# Patient Record
Sex: FEMALE | Race: WHITE | Employment: FULL TIME | URBAN - METROPOLITAN AREA
[De-identification: names, ages, dates, MRNs, and addresses within clinical notes are randomized per-mention and may not be internally consistent; named-entity substitution may affect disease eponyms.]

---

## 2017-01-10 ENCOUNTER — OFFICE VISIT (OUTPATIENT)
Dept: FAMILY MEDICINE CLINIC | Age: 27
End: 2017-01-10

## 2017-01-10 VITALS
SYSTOLIC BLOOD PRESSURE: 128 MMHG | DIASTOLIC BLOOD PRESSURE: 84 MMHG | BODY MASS INDEX: 18.91 KG/M2 | RESPIRATION RATE: 12 BRPM | TEMPERATURE: 98.4 F | WEIGHT: 126.2 LBS | HEART RATE: 88 BPM

## 2017-01-10 DIAGNOSIS — F39 MOOD DISORDER (HCC): Primary | ICD-10-CM

## 2017-01-10 DIAGNOSIS — F41.9 ANXIETY: ICD-10-CM

## 2017-01-10 PROCEDURE — 99213 OFFICE O/P EST LOW 20 MIN: CPT | Performed by: FAMILY MEDICINE

## 2017-01-10 RX ORDER — CLONAZEPAM 1 MG/1
1 TABLET ORAL 3 TIMES DAILY PRN
Qty: 75 TABLET | Refills: 1 | Status: SHIPPED | OUTPATIENT
Start: 2017-01-10 | End: 2017-03-07 | Stop reason: SDUPTHER

## 2017-01-10 RX ORDER — CLONAZEPAM 0.5 MG/1
0.5 TABLET ORAL 3 TIMES DAILY PRN
Qty: 75 TABLET | Refills: 0 | Status: CANCELLED | OUTPATIENT
Start: 2017-01-10

## 2017-01-15 LAB
6-ACETYLMORPHINE: NOT DETECTED
7-AMINOCLONAZEPAM: PRESENT
ALPHA-OH-ALPRAZOLAM: PRESENT
ALPRAZOLAM: PRESENT
AMPHETAMINE: NOT DETECTED
BARBITURATES: NOT DETECTED
BENZOYLECGONINE: NOT DETECTED
BUPRENORPHINE: NOT DETECTED
CARISOPRODOL: NOT DETECTED
CLONAZEPAM: NOT DETECTED
CODEINE: NOT DETECTED
CREATININE URINE: 121.9 MG/DL (ref 20–400)
DIAZEPAM: NOT DETECTED
DRUGS EXPECTED: NORMAL
EER PAIN MGT DRUG PANEL, HIGH RES/EMIT U: NORMAL
ETHYL GLUCURONIDE: NOT DETECTED
FENTANYL: NOT DETECTED
HYDROCODONE: NOT DETECTED
HYDROMORPHONE: NOT DETECTED
LORAZEPAM: NOT DETECTED
MARIJUANA METABOLITE: PRESENT
MDA: NOT DETECTED
MDEA: NOT DETECTED
MDMA URINE: NOT DETECTED
MEPERIDINE: NOT DETECTED
METHADONE: NOT DETECTED
METHAMPHETAMINE: NOT DETECTED
METHYLPHENIDATE: NOT DETECTED
MIDAZOLAM: NOT DETECTED
MORPHINE: NOT DETECTED
NORBUPRENORPHINE, FREE: NOT DETECTED
NORDIAZEPAM: NOT DETECTED
NORFENTANYL: NOT DETECTED
NORHYDROCODONE, URINE: NOT DETECTED
NOROXYCODONE: NOT DETECTED
NOROXYMORPHONE, URINE: NOT DETECTED
OXAZEPAM: NOT DETECTED
OXYCODONE: NOT DETECTED
OXYMORPHONE: NOT DETECTED
PAIN MANAGEMENT DRUG PANEL: NORMAL
PAIN MANAGEMENT DRUG PANEL: NORMAL
PCP: NOT DETECTED
PHENTERMINE: NOT DETECTED
PROPOXYPHENE: NOT DETECTED
TAPENTADOL, URINE: NOT DETECTED
TAPENTADOL-O-SULFATE, URINE: NOT DETECTED
TEMAZEPAM: NOT DETECTED
TRAMADOL: NOT DETECTED
ZOLPIDEM: NOT DETECTED

## 2017-02-04 DIAGNOSIS — L70.9 ACNE, UNSPECIFIED ACNE TYPE: ICD-10-CM

## 2017-02-04 DIAGNOSIS — F39 MOOD DISORDER (HCC): ICD-10-CM

## 2017-02-09 RX ORDER — MINOCYCLINE HYDROCHLORIDE 75 MG/1
CAPSULE ORAL
Qty: 60 CAPSULE | Refills: 5 | Status: SHIPPED | OUTPATIENT
Start: 2017-02-09 | End: 2017-07-18 | Stop reason: SDUPTHER

## 2017-02-09 RX ORDER — PAROXETINE 30 MG/1
TABLET, FILM COATED ORAL
Qty: 15 TABLET | Refills: 5 | Status: SHIPPED | OUTPATIENT
Start: 2017-02-09 | End: 2017-04-04 | Stop reason: ALTCHOICE

## 2017-03-01 ENCOUNTER — TELEPHONE (OUTPATIENT)
Dept: FAMILY MEDICINE CLINIC | Age: 27
End: 2017-03-01

## 2017-03-07 ENCOUNTER — OFFICE VISIT (OUTPATIENT)
Dept: FAMILY MEDICINE CLINIC | Age: 27
End: 2017-03-07

## 2017-03-07 VITALS
DIASTOLIC BLOOD PRESSURE: 83 MMHG | TEMPERATURE: 97.8 F | HEART RATE: 118 BPM | SYSTOLIC BLOOD PRESSURE: 107 MMHG | WEIGHT: 130 LBS | BODY MASS INDEX: 19.48 KG/M2 | OXYGEN SATURATION: 99 %

## 2017-03-07 DIAGNOSIS — F39 MOOD DISORDER (HCC): ICD-10-CM

## 2017-03-07 DIAGNOSIS — R22.1 NECK SWELLING: ICD-10-CM

## 2017-03-07 DIAGNOSIS — F41.9 ANXIETY: Primary | ICD-10-CM

## 2017-03-07 DIAGNOSIS — Z86.79 HISTORY OF SINUS TACHYCARDIA: ICD-10-CM

## 2017-03-07 PROCEDURE — 99213 OFFICE O/P EST LOW 20 MIN: CPT | Performed by: FAMILY MEDICINE

## 2017-03-07 RX ORDER — CEPHALEXIN 500 MG/1
500 CAPSULE ORAL
COMMUNITY
Start: 2017-03-01 | End: 2017-03-11

## 2017-03-07 RX ORDER — CLONAZEPAM 1 MG/1
1 TABLET ORAL 3 TIMES DAILY PRN
Qty: 75 TABLET | Refills: 2 | Status: SHIPPED | OUTPATIENT
Start: 2017-03-07 | End: 2017-04-04 | Stop reason: ALTCHOICE

## 2017-03-10 DIAGNOSIS — F41.9 ANXIETY: ICD-10-CM

## 2017-03-14 RX ORDER — CLONAZEPAM 1 MG/1
TABLET ORAL
Qty: 75 TABLET | Refills: 0 | OUTPATIENT
Start: 2017-03-14

## 2017-03-29 ENCOUNTER — OFFICE VISIT (OUTPATIENT)
Dept: FAMILY MEDICINE CLINIC | Age: 27
End: 2017-03-29

## 2017-03-29 VITALS
HEART RATE: 103 BPM | SYSTOLIC BLOOD PRESSURE: 106 MMHG | BODY MASS INDEX: 19.18 KG/M2 | DIASTOLIC BLOOD PRESSURE: 74 MMHG | TEMPERATURE: 97 F | OXYGEN SATURATION: 99 % | WEIGHT: 128 LBS

## 2017-03-29 DIAGNOSIS — Z72.0 TOBACCO USE: ICD-10-CM

## 2017-03-29 DIAGNOSIS — F19.930: Primary | ICD-10-CM

## 2017-03-29 DIAGNOSIS — F39 MOOD DISORDER (HCC): ICD-10-CM

## 2017-03-29 DIAGNOSIS — M79.18 MYOFASCIAL PAIN: ICD-10-CM

## 2017-03-29 DIAGNOSIS — Z86.79 HISTORY OF SINUS TACHYCARDIA: ICD-10-CM

## 2017-03-29 PROCEDURE — 99214 OFFICE O/P EST MOD 30 MIN: CPT | Performed by: FAMILY MEDICINE

## 2017-03-29 RX ORDER — BUPROPION HYDROCHLORIDE 100 MG/1
100 TABLET, EXTENDED RELEASE ORAL 2 TIMES DAILY
COMMUNITY
End: 2017-04-18 | Stop reason: ALTCHOICE

## 2017-03-29 RX ORDER — CLONIDINE HYDROCHLORIDE 0.1 MG/1
0.1 TABLET ORAL
COMMUNITY
Start: 2015-10-26 | End: 2017-04-18 | Stop reason: ALTCHOICE

## 2017-04-03 ENCOUNTER — OFFICE VISIT (OUTPATIENT)
Dept: PSYCHOLOGY | Age: 27
End: 2017-04-03

## 2017-04-03 DIAGNOSIS — F41.1 GENERALIZED ANXIETY DISORDER: Primary | ICD-10-CM

## 2017-04-03 DIAGNOSIS — F39 MOOD DISORDER (HCC): ICD-10-CM

## 2017-04-03 DIAGNOSIS — F19.930: ICD-10-CM

## 2017-04-03 PROBLEM — F19.939: Status: ACTIVE | Noted: 2017-04-03

## 2017-04-03 PROBLEM — Z72.0 TOBACCO USE: Status: ACTIVE | Noted: 2017-04-03

## 2017-04-03 PROCEDURE — 90791 PSYCH DIAGNOSTIC EVALUATION: CPT | Performed by: PSYCHOLOGIST

## 2017-04-03 ASSESSMENT — PATIENT HEALTH QUESTIONNAIRE - PHQ9
1. LITTLE INTEREST OR PLEASURE IN DOING THINGS: 3
4. FEELING TIRED OR HAVING LITTLE ENERGY: 3
9. THOUGHTS THAT YOU WOULD BE BETTER OFF DEAD, OR OF HURTING YOURSELF: 3
3. TROUBLE FALLING OR STAYING ASLEEP: 3
SUM OF ALL RESPONSES TO PHQ QUESTIONS 1-9: 27
10. IF YOU CHECKED OFF ANY PROBLEMS, HOW DIFFICULT HAVE THESE PROBLEMS MADE IT FOR YOU TO DO YOUR WORK, TAKE CARE OF THINGS AT HOME, OR GET ALONG WITH OTHER PEOPLE: 3
7. TROUBLE CONCENTRATING ON THINGS, SUCH AS READING THE NEWSPAPER OR WATCHING TELEVISION: 3
8. MOVING OR SPEAKING SO SLOWLY THAT OTHER PEOPLE COULD HAVE NOTICED. OR THE OPPOSITE, BEING SO FIGETY OR RESTLESS THAT YOU HAVE BEEN MOVING AROUND A LOT MORE THAN USUAL: 3
6. FEELING BAD ABOUT YOURSELF - OR THAT YOU ARE A FAILURE OR HAVE LET YOURSELF OR YOUR FAMILY DOWN: 3
2. FEELING DOWN, DEPRESSED OR HOPELESS: 3
5. POOR APPETITE OR OVEREATING: 3
SUM OF ALL RESPONSES TO PHQ9 QUESTIONS 1 & 2: 6

## 2017-04-04 ENCOUNTER — OFFICE VISIT (OUTPATIENT)
Dept: FAMILY MEDICINE CLINIC | Age: 27
End: 2017-04-04

## 2017-04-04 VITALS
BODY MASS INDEX: 18.61 KG/M2 | DIASTOLIC BLOOD PRESSURE: 61 MMHG | SYSTOLIC BLOOD PRESSURE: 98 MMHG | HEART RATE: 91 BPM | WEIGHT: 124.2 LBS | OXYGEN SATURATION: 100 % | RESPIRATION RATE: 16 BRPM | TEMPERATURE: 97.7 F

## 2017-04-04 DIAGNOSIS — M79.7 FIBROMYALGIA SYNDROME: Primary | ICD-10-CM

## 2017-04-04 DIAGNOSIS — F41.1 GENERALIZED ANXIETY DISORDER: ICD-10-CM

## 2017-04-04 PROCEDURE — 99214 OFFICE O/P EST MOD 30 MIN: CPT | Performed by: FAMILY MEDICINE

## 2017-04-04 RX ORDER — CELECOXIB 200 MG/1
CAPSULE ORAL
Qty: 45 CAPSULE | Refills: 3 | Status: SHIPPED | OUTPATIENT
Start: 2017-04-04 | End: 2017-05-25 | Stop reason: ALTCHOICE

## 2017-04-04 RX ORDER — DULOXETIN HYDROCHLORIDE 30 MG/1
30 CAPSULE, DELAYED RELEASE ORAL DAILY
Qty: 30 CAPSULE | Refills: 3 | Status: SHIPPED | OUTPATIENT
Start: 2017-04-04 | End: 2017-05-03 | Stop reason: DRUGHIGH

## 2017-04-04 RX ORDER — CLONAZEPAM 1 MG/1
TABLET ORAL
Qty: 120 TABLET | Refills: 0 | Status: SHIPPED | OUTPATIENT
Start: 2017-04-04 | End: 2017-05-01 | Stop reason: SDUPTHER

## 2017-04-05 ENCOUNTER — TELEPHONE (OUTPATIENT)
Dept: FAMILY MEDICINE CLINIC | Age: 27
End: 2017-04-05

## 2017-04-06 ENCOUNTER — TELEPHONE (OUTPATIENT)
Dept: FAMILY MEDICINE CLINIC | Age: 27
End: 2017-04-06

## 2017-04-17 ENCOUNTER — OFFICE VISIT (OUTPATIENT)
Dept: PSYCHOLOGY | Age: 27
End: 2017-04-17

## 2017-04-17 DIAGNOSIS — F39 MOOD DISORDER (HCC): ICD-10-CM

## 2017-04-17 DIAGNOSIS — F41.1 GENERALIZED ANXIETY DISORDER: Primary | ICD-10-CM

## 2017-04-17 PROCEDURE — 90832 PSYTX W PT 30 MINUTES: CPT | Performed by: PSYCHOLOGIST

## 2017-04-17 ASSESSMENT — PATIENT HEALTH QUESTIONNAIRE - PHQ9
1. LITTLE INTEREST OR PLEASURE IN DOING THINGS: 2
7. TROUBLE CONCENTRATING ON THINGS, SUCH AS READING THE NEWSPAPER OR WATCHING TELEVISION: 3
3. TROUBLE FALLING OR STAYING ASLEEP: 3
6. FEELING BAD ABOUT YOURSELF - OR THAT YOU ARE A FAILURE OR HAVE LET YOURSELF OR YOUR FAMILY DOWN: 3
5. POOR APPETITE OR OVEREATING: 3
SUM OF ALL RESPONSES TO PHQ9 QUESTIONS 1 & 2: 4
2. FEELING DOWN, DEPRESSED OR HOPELESS: 2
9. THOUGHTS THAT YOU WOULD BE BETTER OFF DEAD, OR OF HURTING YOURSELF: 1
4. FEELING TIRED OR HAVING LITTLE ENERGY: 3
SUM OF ALL RESPONSES TO PHQ QUESTIONS 1-9: 23
8. MOVING OR SPEAKING SO SLOWLY THAT OTHER PEOPLE COULD HAVE NOTICED. OR THE OPPOSITE, BEING SO FIGETY OR RESTLESS THAT YOU HAVE BEEN MOVING AROUND A LOT MORE THAN USUAL: 3

## 2017-04-18 ENCOUNTER — OFFICE VISIT (OUTPATIENT)
Dept: FAMILY MEDICINE CLINIC | Age: 27
End: 2017-04-18

## 2017-04-18 VITALS
OXYGEN SATURATION: 99 % | BODY MASS INDEX: 18.34 KG/M2 | DIASTOLIC BLOOD PRESSURE: 75 MMHG | TEMPERATURE: 96.6 F | WEIGHT: 122.4 LBS | RESPIRATION RATE: 12 BRPM | SYSTOLIC BLOOD PRESSURE: 123 MMHG | HEART RATE: 105 BPM

## 2017-04-18 DIAGNOSIS — M79.7 FIBROMYALGIA SYNDROME: Primary | ICD-10-CM

## 2017-04-18 DIAGNOSIS — Z72.0 TOBACCO USE: ICD-10-CM

## 2017-04-18 DIAGNOSIS — F39 MOOD DISORDER (HCC): ICD-10-CM

## 2017-04-18 PROBLEM — F19.939: Status: RESOLVED | Noted: 2017-04-03 | Resolved: 2017-04-18

## 2017-04-18 PROCEDURE — 99213 OFFICE O/P EST LOW 20 MIN: CPT | Performed by: FAMILY MEDICINE

## 2017-05-01 DIAGNOSIS — F41.1 GENERALIZED ANXIETY DISORDER: ICD-10-CM

## 2017-05-02 RX ORDER — CLONAZEPAM 1 MG/1
TABLET ORAL
Qty: 120 TABLET | Refills: 0 | Status: SHIPPED | OUTPATIENT
Start: 2017-05-02 | End: 2017-05-25 | Stop reason: SDUPTHER

## 2017-05-03 ENCOUNTER — OFFICE VISIT (OUTPATIENT)
Dept: FAMILY MEDICINE CLINIC | Age: 27
End: 2017-05-03

## 2017-05-03 VITALS
HEART RATE: 98 BPM | TEMPERATURE: 97.7 F | WEIGHT: 123.4 LBS | BODY MASS INDEX: 18.7 KG/M2 | HEIGHT: 68 IN | SYSTOLIC BLOOD PRESSURE: 111 MMHG | OXYGEN SATURATION: 100 % | DIASTOLIC BLOOD PRESSURE: 72 MMHG | RESPIRATION RATE: 12 BRPM

## 2017-05-03 DIAGNOSIS — M79.7 FIBROMYALGIA SYNDROME: Primary | ICD-10-CM

## 2017-05-03 DIAGNOSIS — Q87.40 MARFAN SYNDROME: ICD-10-CM

## 2017-05-03 DIAGNOSIS — F41.1 GENERALIZED ANXIETY DISORDER: ICD-10-CM

## 2017-05-03 DIAGNOSIS — Z72.0 TOBACCO USE: ICD-10-CM

## 2017-05-03 PROCEDURE — 99213 OFFICE O/P EST LOW 20 MIN: CPT | Performed by: FAMILY MEDICINE

## 2017-05-03 RX ORDER — DULOXETIN HYDROCHLORIDE 60 MG/1
60 CAPSULE, DELAYED RELEASE ORAL DAILY
Qty: 30 CAPSULE | Refills: 5 | Status: SHIPPED | OUTPATIENT
Start: 2017-05-03 | End: 2017-07-18 | Stop reason: SDUPTHER

## 2017-05-03 RX ORDER — ACETAMINOPHEN, ASPIRIN AND CAFFEINE 250; 250; 65 MG/1; MG/1; MG/1
1 TABLET, FILM COATED ORAL EVERY 6 HOURS PRN
COMMUNITY
End: 2017-09-05 | Stop reason: ALTCHOICE

## 2017-05-03 RX ORDER — ALPRAZOLAM 0.5 MG/1
0.5 TABLET ORAL 3 TIMES DAILY PRN
Qty: 6 TABLET | Refills: 0 | Status: SHIPPED | OUTPATIENT
Start: 2017-05-03 | End: 2017-05-25 | Stop reason: ALTCHOICE

## 2017-05-03 RX ORDER — ALPRAZOLAM 0.5 MG/1
0.5 TABLET ORAL 3 TIMES DAILY PRN
Qty: 6 TABLET | Refills: 0 | Status: SHIPPED | OUTPATIENT
Start: 2017-05-03 | End: 2017-05-03 | Stop reason: SDUPTHER

## 2017-05-03 RX ORDER — TRAMADOL HYDROCHLORIDE 50 MG/1
50 TABLET ORAL EVERY 6 HOURS PRN
Qty: 30 TABLET | Refills: 0 | Status: SHIPPED | OUTPATIENT
Start: 2017-05-03 | End: 2017-05-09 | Stop reason: SDUPTHER

## 2017-05-09 DIAGNOSIS — F41.1 GENERALIZED ANXIETY DISORDER: ICD-10-CM

## 2017-05-09 DIAGNOSIS — M79.7 FIBROMYALGIA SYNDROME: ICD-10-CM

## 2017-05-10 RX ORDER — TRAMADOL HYDROCHLORIDE 50 MG/1
50 TABLET ORAL EVERY 6 HOURS PRN
Qty: 90 TABLET | Refills: 0 | Status: SHIPPED | OUTPATIENT
Start: 2017-05-10 | End: 2017-05-25 | Stop reason: SDUPTHER

## 2017-05-25 ENCOUNTER — OFFICE VISIT (OUTPATIENT)
Dept: PSYCHOLOGY | Age: 27
End: 2017-05-25

## 2017-05-25 ENCOUNTER — OFFICE VISIT (OUTPATIENT)
Dept: FAMILY MEDICINE CLINIC | Age: 27
End: 2017-05-25

## 2017-05-25 VITALS
HEART RATE: 85 BPM | OXYGEN SATURATION: 100 % | SYSTOLIC BLOOD PRESSURE: 97 MMHG | DIASTOLIC BLOOD PRESSURE: 59 MMHG | RESPIRATION RATE: 16 BRPM

## 2017-05-25 DIAGNOSIS — F41.1 GENERALIZED ANXIETY DISORDER: Primary | ICD-10-CM

## 2017-05-25 DIAGNOSIS — F39 MOOD DISORDER (HCC): ICD-10-CM

## 2017-05-25 DIAGNOSIS — F32.89 OTHER DEPRESSION: Primary | ICD-10-CM

## 2017-05-25 DIAGNOSIS — F41.1 GENERALIZED ANXIETY DISORDER: ICD-10-CM

## 2017-05-25 DIAGNOSIS — M79.7 FIBROMYALGIA SYNDROME: ICD-10-CM

## 2017-05-25 PROCEDURE — 90832 PSYTX W PT 30 MINUTES: CPT | Performed by: PSYCHOLOGIST

## 2017-05-25 PROCEDURE — 99213 OFFICE O/P EST LOW 20 MIN: CPT | Performed by: FAMILY MEDICINE

## 2017-05-25 RX ORDER — CLONAZEPAM 1 MG/1
TABLET ORAL
Qty: 120 TABLET | Refills: 0 | Status: SHIPPED | OUTPATIENT
Start: 2017-06-04 | End: 2017-06-13 | Stop reason: SDUPTHER

## 2017-05-25 RX ORDER — TRAMADOL HYDROCHLORIDE 50 MG/1
TABLET ORAL
Qty: 60 TABLET | Refills: 0 | Status: SHIPPED | OUTPATIENT
Start: 2017-06-10 | End: 2017-06-13 | Stop reason: SDUPTHER

## 2017-05-25 RX ORDER — ARIPIPRAZOLE 5 MG/1
5 TABLET ORAL DAILY
Qty: 30 TABLET | Refills: 3 | Status: SHIPPED | OUTPATIENT
Start: 2017-05-25 | End: 2017-07-18 | Stop reason: SDUPTHER

## 2017-05-25 RX ORDER — ARIPIPRAZOLE 5 MG/1
5 TABLET ORAL DAILY
Qty: 30 TABLET | Refills: 3 | Status: SHIPPED | OUTPATIENT
Start: 2017-05-25 | End: 2017-05-25 | Stop reason: SDUPTHER

## 2017-05-25 ASSESSMENT — PATIENT HEALTH QUESTIONNAIRE - PHQ9
1. LITTLE INTEREST OR PLEASURE IN DOING THINGS: 2
7. TROUBLE CONCENTRATING ON THINGS, SUCH AS READING THE NEWSPAPER OR WATCHING TELEVISION: 2
6. FEELING BAD ABOUT YOURSELF - OR THAT YOU ARE A FAILURE OR HAVE LET YOURSELF OR YOUR FAMILY DOWN: 2
10. IF YOU CHECKED OFF ANY PROBLEMS, HOW DIFFICULT HAVE THESE PROBLEMS MADE IT FOR YOU TO DO YOUR WORK, TAKE CARE OF THINGS AT HOME, OR GET ALONG WITH OTHER PEOPLE: 1
5. POOR APPETITE OR OVEREATING: 0
9. THOUGHTS THAT YOU WOULD BE BETTER OFF DEAD, OR OF HURTING YOURSELF: 0
3. TROUBLE FALLING OR STAYING ASLEEP: 0
SUM OF ALL RESPONSES TO PHQ QUESTIONS 1-9: 12
SUM OF ALL RESPONSES TO PHQ9 QUESTIONS 1 & 2: 4
4. FEELING TIRED OR HAVING LITTLE ENERGY: 2
8. MOVING OR SPEAKING SO SLOWLY THAT OTHER PEOPLE COULD HAVE NOTICED. OR THE OPPOSITE, BEING SO FIGETY OR RESTLESS THAT YOU HAVE BEEN MOVING AROUND A LOT MORE THAN USUAL: 2
2. FEELING DOWN, DEPRESSED OR HOPELESS: 2

## 2017-06-13 ENCOUNTER — OFFICE VISIT (OUTPATIENT)
Dept: FAMILY MEDICINE CLINIC | Age: 27
End: 2017-06-13

## 2017-06-13 VITALS
WEIGHT: 123.8 LBS | TEMPERATURE: 97.9 F | DIASTOLIC BLOOD PRESSURE: 71 MMHG | HEART RATE: 85 BPM | BODY MASS INDEX: 18.82 KG/M2 | SYSTOLIC BLOOD PRESSURE: 105 MMHG | OXYGEN SATURATION: 98 %

## 2017-06-13 DIAGNOSIS — M79.7 FIBROMYALGIA SYNDROME: ICD-10-CM

## 2017-06-13 DIAGNOSIS — F41.1 GENERALIZED ANXIETY DISORDER: ICD-10-CM

## 2017-06-13 PROCEDURE — 99213 OFFICE O/P EST LOW 20 MIN: CPT | Performed by: FAMILY MEDICINE

## 2017-06-13 RX ORDER — TRAMADOL HYDROCHLORIDE 50 MG/1
TABLET ORAL
Qty: 90 TABLET | Refills: 1 | Status: SHIPPED | OUTPATIENT
Start: 2017-06-13 | End: 2017-07-18 | Stop reason: SDUPTHER

## 2017-06-13 RX ORDER — CLONAZEPAM 1 MG/1
TABLET ORAL
Qty: 120 TABLET | Refills: 0 | Status: SHIPPED | OUTPATIENT
Start: 2017-06-28 | End: 2017-07-18 | Stop reason: SDUPTHER

## 2017-06-22 ENCOUNTER — OFFICE VISIT (OUTPATIENT)
Dept: PSYCHOLOGY | Age: 27
End: 2017-06-22

## 2017-06-22 DIAGNOSIS — F39 MOOD DISORDER (HCC): ICD-10-CM

## 2017-06-22 DIAGNOSIS — F41.1 GENERALIZED ANXIETY DISORDER: Primary | ICD-10-CM

## 2017-06-22 PROCEDURE — 90832 PSYTX W PT 30 MINUTES: CPT | Performed by: PSYCHOLOGIST

## 2017-06-22 ASSESSMENT — PATIENT HEALTH QUESTIONNAIRE - PHQ9
8. MOVING OR SPEAKING SO SLOWLY THAT OTHER PEOPLE COULD HAVE NOTICED. OR THE OPPOSITE, BEING SO FIGETY OR RESTLESS THAT YOU HAVE BEEN MOVING AROUND A LOT MORE THAN USUAL: 2
3. TROUBLE FALLING OR STAYING ASLEEP: 0
9. THOUGHTS THAT YOU WOULD BE BETTER OFF DEAD, OR OF HURTING YOURSELF: 0
4. FEELING TIRED OR HAVING LITTLE ENERGY: 2
2. FEELING DOWN, DEPRESSED OR HOPELESS: 2
SUM OF ALL RESPONSES TO PHQ QUESTIONS 1-9: 12
1. LITTLE INTEREST OR PLEASURE IN DOING THINGS: 0
SUM OF ALL RESPONSES TO PHQ9 QUESTIONS 1 & 2: 2
5. POOR APPETITE OR OVEREATING: 2
7. TROUBLE CONCENTRATING ON THINGS, SUCH AS READING THE NEWSPAPER OR WATCHING TELEVISION: 2
6. FEELING BAD ABOUT YOURSELF - OR THAT YOU ARE A FAILURE OR HAVE LET YOURSELF OR YOUR FAMILY DOWN: 2

## 2017-06-22 ASSESSMENT — ANXIETY QUESTIONNAIRES
3. WORRYING TOO MUCH ABOUT DIFFERENT THINGS: 3-NEARLY EVERY DAY
GAD7 TOTAL SCORE: 12
1. FEELING NERVOUS, ANXIOUS, OR ON EDGE: 3-NEARLY EVERY DAY
4. TROUBLE RELAXING: 2-OVER HALF THE DAYS
7. FEELING AFRAID AS IF SOMETHING AWFUL MIGHT HAPPEN: 1-SEVERAL DAYS
5. BEING SO RESTLESS THAT IT IS HARD TO SIT STILL: 0-NOT AT ALL SURE
6. BECOMING EASILY ANNOYED OR IRRITABLE: 1-SEVERAL DAYS
2. NOT BEING ABLE TO STOP OR CONTROL WORRYING: 2-OVER HALF THE DAYS

## 2017-06-23 RX ORDER — METHOCARBAMOL 500 MG/1
TABLET, FILM COATED ORAL
Qty: 30 TABLET | Refills: 0 | Status: SHIPPED | OUTPATIENT
Start: 2017-06-23 | End: 2017-07-18 | Stop reason: SDUPTHER

## 2017-06-25 DIAGNOSIS — M79.18 MYOFASCIAL PAIN: ICD-10-CM

## 2017-06-27 RX ORDER — IBUPROFEN 800 MG/1
TABLET ORAL
Qty: 60 TABLET | Refills: 0 | Status: SHIPPED | OUTPATIENT
Start: 2017-06-27 | End: 2017-07-18 | Stop reason: SDUPTHER

## 2017-07-18 ENCOUNTER — OFFICE VISIT (OUTPATIENT)
Dept: FAMILY MEDICINE CLINIC | Age: 27
End: 2017-07-18

## 2017-07-18 VITALS
OXYGEN SATURATION: 98 % | DIASTOLIC BLOOD PRESSURE: 71 MMHG | TEMPERATURE: 97.5 F | HEART RATE: 95 BPM | SYSTOLIC BLOOD PRESSURE: 107 MMHG

## 2017-07-18 DIAGNOSIS — M79.7 FIBROMYALGIA SYNDROME: ICD-10-CM

## 2017-07-18 DIAGNOSIS — F41.1 GENERALIZED ANXIETY DISORDER: Primary | ICD-10-CM

## 2017-07-18 DIAGNOSIS — L70.9 ACNE, UNSPECIFIED ACNE TYPE: ICD-10-CM

## 2017-07-18 DIAGNOSIS — M79.18 MYOFASCIAL PAIN: ICD-10-CM

## 2017-07-18 DIAGNOSIS — F32.89 OTHER DEPRESSION: ICD-10-CM

## 2017-07-18 PROCEDURE — 99213 OFFICE O/P EST LOW 20 MIN: CPT | Performed by: FAMILY MEDICINE

## 2017-07-18 RX ORDER — TRAMADOL HYDROCHLORIDE 50 MG/1
TABLET ORAL
Qty: 90 TABLET | Refills: 2 | Status: SHIPPED | OUTPATIENT
Start: 2017-07-18 | End: 2017-10-04 | Stop reason: SDUPTHER

## 2017-07-18 RX ORDER — IBUPROFEN 800 MG/1
800 TABLET ORAL EVERY 8 HOURS PRN
Qty: 60 TABLET | Refills: 0 | Status: SHIPPED | OUTPATIENT
Start: 2017-07-18 | End: 2017-09-05 | Stop reason: ALTCHOICE

## 2017-07-18 RX ORDER — DULOXETIN HYDROCHLORIDE 60 MG/1
60 CAPSULE, DELAYED RELEASE ORAL DAILY
Qty: 30 CAPSULE | Refills: 5 | Status: SHIPPED | OUTPATIENT
Start: 2017-07-18 | End: 2017-09-05 | Stop reason: ALTCHOICE

## 2017-07-18 RX ORDER — MINOCYCLINE HYDROCHLORIDE 75 MG/1
CAPSULE ORAL
Qty: 60 CAPSULE | Refills: 5 | Status: SHIPPED | OUTPATIENT
Start: 2017-07-18 | End: 2017-09-05 | Stop reason: ALTCHOICE

## 2017-07-18 RX ORDER — METHOCARBAMOL 500 MG/1
TABLET, FILM COATED ORAL
Qty: 30 TABLET | Refills: 2 | Status: SHIPPED | OUTPATIENT
Start: 2017-07-18 | End: 2017-09-05

## 2017-07-18 RX ORDER — ARIPIPRAZOLE 5 MG/1
5 TABLET ORAL DAILY
Qty: 30 TABLET | Refills: 5 | Status: SHIPPED | OUTPATIENT
Start: 2017-07-18 | End: 2017-11-30

## 2017-07-18 RX ORDER — ONDANSETRON 4 MG/1
4 TABLET, FILM COATED ORAL DAILY PRN
Qty: 30 TABLET | Refills: 5 | Status: SHIPPED | OUTPATIENT
Start: 2017-07-18 | End: 2017-09-05 | Stop reason: SDUPTHER

## 2017-07-18 RX ORDER — CLONAZEPAM 1 MG/1
TABLET ORAL
Qty: 120 TABLET | Refills: 2 | Status: SHIPPED | OUTPATIENT
Start: 2017-07-18 | End: 2017-10-04 | Stop reason: SDUPTHER

## 2017-07-20 ENCOUNTER — OFFICE VISIT (OUTPATIENT)
Dept: PSYCHOLOGY | Age: 27
End: 2017-07-20

## 2017-07-20 DIAGNOSIS — F41.1 GENERALIZED ANXIETY DISORDER: Primary | ICD-10-CM

## 2017-07-20 DIAGNOSIS — F39 MOOD DISORDER (HCC): ICD-10-CM

## 2017-07-20 PROCEDURE — 90832 PSYTX W PT 30 MINUTES: CPT | Performed by: PSYCHOLOGIST

## 2017-07-20 ASSESSMENT — ANXIETY QUESTIONNAIRES
GAD7 TOTAL SCORE: 14
2. NOT BEING ABLE TO STOP OR CONTROL WORRYING: 3-NEARLY EVERY DAY
3. WORRYING TOO MUCH ABOUT DIFFERENT THINGS: 3-NEARLY EVERY DAY
7. FEELING AFRAID AS IF SOMETHING AWFUL MIGHT HAPPEN: 0-NOT AT ALL SURE
6. BECOMING EASILY ANNOYED OR IRRITABLE: 1-SEVERAL DAYS
5. BEING SO RESTLESS THAT IT IS HARD TO SIT STILL: 3-NEARLY EVERY DAY
1. FEELING NERVOUS, ANXIOUS, OR ON EDGE: 3-NEARLY EVERY DAY
4. TROUBLE RELAXING: 1-SEVERAL DAYS

## 2017-07-20 ASSESSMENT — PATIENT HEALTH QUESTIONNAIRE - PHQ9
3. TROUBLE FALLING OR STAYING ASLEEP: 1
5. POOR APPETITE OR OVEREATING: 1
1. LITTLE INTEREST OR PLEASURE IN DOING THINGS: 2
7. TROUBLE CONCENTRATING ON THINGS, SUCH AS READING THE NEWSPAPER OR WATCHING TELEVISION: 2
2. FEELING DOWN, DEPRESSED OR HOPELESS: 1
4. FEELING TIRED OR HAVING LITTLE ENERGY: 2
9. THOUGHTS THAT YOU WOULD BE BETTER OFF DEAD, OR OF HURTING YOURSELF: 0
6. FEELING BAD ABOUT YOURSELF - OR THAT YOU ARE A FAILURE OR HAVE LET YOURSELF OR YOUR FAMILY DOWN: 0
SUM OF ALL RESPONSES TO PHQ9 QUESTIONS 1 & 2: 3
SUM OF ALL RESPONSES TO PHQ QUESTIONS 1-9: 12
8. MOVING OR SPEAKING SO SLOWLY THAT OTHER PEOPLE COULD HAVE NOTICED. OR THE OPPOSITE, BEING SO FIGETY OR RESTLESS THAT YOU HAVE BEEN MOVING AROUND A LOT MORE THAN USUAL: 3

## 2017-08-23 NOTE — TELEPHONE ENCOUNTER
PRMP was reviewed on 08/23/17 and activity was consistent. Clonazepam last dispensed 7/28.  30 days would be Aug 27. One day early would actually be Aug 26. Please work with patient and pharmacy why patient requesting to  med  2 days early?

## 2017-09-05 ENCOUNTER — OFFICE VISIT (OUTPATIENT)
Dept: FAMILY MEDICINE CLINIC | Age: 27
End: 2017-09-05

## 2017-09-05 VITALS
RESPIRATION RATE: 16 BRPM | DIASTOLIC BLOOD PRESSURE: 75 MMHG | OXYGEN SATURATION: 98 % | HEART RATE: 92 BPM | BODY MASS INDEX: 20.13 KG/M2 | WEIGHT: 132.4 LBS | SYSTOLIC BLOOD PRESSURE: 105 MMHG

## 2017-09-05 DIAGNOSIS — F39 MOOD DISORDER (HCC): ICD-10-CM

## 2017-09-05 DIAGNOSIS — F41.9 ANXIETY IN PREGNANCY, ANTEPARTUM, FIRST TRIMESTER: Primary | ICD-10-CM

## 2017-09-05 DIAGNOSIS — O99.341 ANXIETY IN PREGNANCY, ANTEPARTUM, FIRST TRIMESTER: Primary | ICD-10-CM

## 2017-09-05 DIAGNOSIS — F41.1 GENERALIZED ANXIETY DISORDER: ICD-10-CM

## 2017-09-05 DIAGNOSIS — Z32.01 POSITIVE PREGNANCY TEST: ICD-10-CM

## 2017-09-05 DIAGNOSIS — Z3A.01 LESS THAN 8 WEEKS GESTATION OF PREGNANCY: ICD-10-CM

## 2017-09-05 DIAGNOSIS — O21.0 MORNING SICKNESS: ICD-10-CM

## 2017-09-05 LAB
CONTROL: NORMAL
PREGNANCY TEST URINE, POC: POSITIVE

## 2017-09-05 PROCEDURE — 81025 URINE PREGNANCY TEST: CPT | Performed by: FAMILY MEDICINE

## 2017-09-05 PROCEDURE — 99214 OFFICE O/P EST MOD 30 MIN: CPT | Performed by: FAMILY MEDICINE

## 2017-09-05 RX ORDER — CITALOPRAM 10 MG/1
10 TABLET ORAL DAILY
Qty: 30 TABLET | Refills: 3 | Status: SHIPPED | OUTPATIENT
Start: 2017-09-05 | End: 2017-10-04 | Stop reason: DRUGHIGH

## 2017-09-05 RX ORDER — ACETAMINOPHEN 500 MG
1000 TABLET ORAL 3 TIMES DAILY PRN
Qty: 120 TABLET | Refills: 3 | Status: SHIPPED | OUTPATIENT
Start: 2017-09-05 | End: 2017-11-30

## 2017-09-05 RX ORDER — ONDANSETRON 4 MG/1
4 TABLET, FILM COATED ORAL DAILY PRN
Qty: 30 TABLET | Refills: 5 | Status: SHIPPED | OUTPATIENT
Start: 2017-09-05 | End: 2018-07-23

## 2017-09-05 RX ORDER — DULOXETIN HYDROCHLORIDE 30 MG/1
CAPSULE, DELAYED RELEASE ORAL
Qty: 3 CAPSULE | Refills: 3 | Status: SHIPPED | OUTPATIENT
Start: 2017-09-05 | End: 2017-11-30

## 2017-10-04 ENCOUNTER — OFFICE VISIT (OUTPATIENT)
Dept: FAMILY MEDICINE CLINIC | Age: 27
End: 2017-10-04

## 2017-10-04 VITALS
SYSTOLIC BLOOD PRESSURE: 111 MMHG | OXYGEN SATURATION: 100 % | DIASTOLIC BLOOD PRESSURE: 76 MMHG | HEART RATE: 73 BPM | BODY MASS INDEX: 18.6 KG/M2 | WEIGHT: 125.6 LBS | HEIGHT: 69 IN

## 2017-10-04 DIAGNOSIS — M79.7 FIBROMYALGIA SYNDROME: ICD-10-CM

## 2017-10-04 DIAGNOSIS — F41.1 GENERALIZED ANXIETY DISORDER: Primary | ICD-10-CM

## 2017-10-04 DIAGNOSIS — Z3A.09 9 WEEKS GESTATION OF PREGNANCY: ICD-10-CM

## 2017-10-04 PROCEDURE — 99213 OFFICE O/P EST LOW 20 MIN: CPT | Performed by: FAMILY MEDICINE

## 2017-10-04 RX ORDER — CITALOPRAM 20 MG/1
20 TABLET ORAL DAILY
Qty: 30 TABLET | Refills: 3 | Status: SHIPPED | OUTPATIENT
Start: 2017-10-04 | End: 2018-02-04

## 2017-10-04 RX ORDER — CLONAZEPAM 1 MG/1
TABLET ORAL
Qty: 90 TABLET | Refills: 2 | Status: SHIPPED | OUTPATIENT
Start: 2017-10-04 | End: 2018-01-10 | Stop reason: SDUPTHER

## 2017-10-04 RX ORDER — TRAMADOL HYDROCHLORIDE 50 MG/1
TABLET ORAL
Qty: 90 TABLET | Refills: 2 | Status: SHIPPED | OUTPATIENT
Start: 2017-10-04 | End: 2018-02-12 | Stop reason: SDUPTHER

## 2017-10-04 NOTE — MR AVS SNAPSHOT
After Visit Summary             Kennth Bence   10/4/2017 9:40 AM   Office Visit    Description:  Female : 1990   Provider:  Jelena Triana DO   Department:  [address] [place] [address] Family Medicine              Your Follow-Up and Future Appointments         Below is a list of your follow-up and future appointments. This may not be a complete list as you may have made appointments directly with providers that we are not aware of or your providers may have made some for you. Please call your providers to confirm appointments. It is important to keep your appointments. Please bring your current insurance card, photo ID, co-pay, and all medication bottles to your appointment. If self-pay, payment is expected at the time of service. Your To-Do List     Follow-Up    Return in about 3 months (around 2018). Information from Your Visit        Department     Name Address Phone Fax    [address] [place] [address] [address] [address] [phone]      You Were Seen for:         Comments    Generalized anxiety disorder   [300.02. ICD-9-CM]         Vital Signs     Blood Pressure Pulse Height Weight Last Menstrual Period Oxygen Saturation    111/76 (Site: Left Arm, Position: Sitting, Cuff Size: Medium Adult) 73 5' 9\" (1.753 m) 125 lb 9.6 oz (57 kg) 2017 (Within Days) 100%    Body Mass Index Smoking Status                18.55 kg/m2 Current Every Day Smoker          Instructions    1) Set goal to wean off clonazepam.    2) Strength of Celexa was increased to 20 mg so you won't need as much of clonazepam.  3) there are a number of physicians you are advised to follow-up with through [facility]. 4) set a goal to quit smoking during pregnancy. 5) follow-up in 2-3 months, sooner as needed.             Today's Medication Changes          These changes are accurate as of: 10/4/17 10:45 AM.  If you have any questions, ask your nurse or doctor. START taking these medications           citalopram 20 MG tablet   Commonly known as:  CELEXA   Instructions: Take 1 tablet by mouth daily   Quantity:  30 tablet   Refills:  3   Started by:  Candis Caldera, DO            Where to Get Your Medications      These medications were sent to [facility] [address] [facility] Deandra Jaeger [phone]  [address] [place] [address]    Hours:  24-hours Phone:  [phone]     citalopram 20 MG tablet    clonazePAM 1 MG tablet    traMADol 50 MG tablet               Your Current Medications Are              clonazePAM (KLONOPIN) 1 MG tablet Take 1 tab po q6h prn anxiety. citalopram (CELEXA) 20 MG tablet Take 1 tablet by mouth daily    traMADol (ULTRAM) 50 MG tablet Take 1 tab po TID prn pain. DULoxetine (CYMBALTA) 30 MG extended release capsule To wean off Cymbalta take 1 capsule daily for days 1 to 3 then switch to citalopram (Celexa)    acetaminophen (APAP EXTRA STRENGTH) 500 MG tablet Take 2 tablets by mouth 3 times daily as needed for Pain    ondansetron (ZOFRAN) 4 MG tablet Take 1 tablet by mouth daily as needed for Nausea or Vomiting    ARIPiprazole (ABILIFY) 5 MG tablet Take 1 tablet by mouth daily      Allergies              Meloxicam Other (See Comments)         Additional Information        Basic Information     Date Of Birth Sex Race Ethnicity Preferred Language    1990 Female White Non-/Non  English      Problem List as of 10/4/2017  Date Reviewed: 7/18/2017                Tobacco use    Generalized anxiety disorder    History of sinus tachycardia    Neck swelling    Myofascial pain    Mood disorder ([facility] [address]    Acne      Preventive Care        Date Due    HIV screening is recommended for all people regardless of risk factors  aged 15-65 years at least once (lifetime) who have never been HIV tested.  7/19/2005 Tetanus Combination Vaccine (1 - Tdap) 7/19/2009    Pneumococcal Vaccine - Pneumovax for adults aged 19-64 years with: chronic heart disease, chronic lung disease, diabetes mellitus, alcoholism, chronic liver disease, or cigarette smoking. (1 of 1 - PPSV23) 7/19/2009    Yearly Flu Vaccine (1) 9/1/2017    Pap Smear 6/6/2020            MyChart Signup           [employer] allows you to send messages to your doctor, view your test results, renew your prescriptions, schedule appointments, view visit notes, and more. How Do I Sign Up? 1. In your Internet browser, go to [email or web address]  2. Click on the Sign Up Now link in the Sign In box. You will see the New Member Sign Up page. 3. Enter your [employer] Access Code exactly as it appears below. You will not need to use this code after youve completed the sign-up process. If you do not sign up before the expiration date, you must request a new code. [employer] Access Code: [email or web address]  Expires: 11/4/2017  5:43 PM    4. Enter your Social Security Number ([id number]) and Date of Birth (mm/dd/yyyy) as indicated and click Submit. You will be taken to the next sign-up page. 5. Create a [employer] ID. This will be your [employer] login ID and cannot be changed, so think of one that is secure and easy to remember. 6. Create a [employer] password. You can change your password at any time. 7. Enter your Password Reset Question and Answer. This can be used at a later time if you forget your password. 8. Enter your e-mail address. You will receive e-mail notification when new information is available in [address]. 9. Click Sign Up. You can now view your medical record. Additional Information  If you have questions, please contact the physician practice where you receive care. Remember, [employer] is NOT to be used for urgent needs. For medical emergencies, dial 911. For questions regarding your [employer] account call [phone].  If you have a clinical question, please call your doctor's office.

## 2017-10-04 NOTE — PATIENT INSTRUCTIONS
1) Set goal to wean off clonazepam.    2) Strength of Celexa was increased to 20 mg so you won't need as much of clonazepam.  3) there are a number of physicians you are advised to follow-up with through [facility]. 4) set a goal to quit smoking during pregnancy. 5) follow-up in 2-3 months, sooner as needed. Goals  to help you quit smoking! Your Doctor asks you to choose one goal to work on to help you stop smoking, or create your own goal!    1. _x_ I agree to set a quit date of _____before my pregnancy____________    2. __I agree to exercise 30 minutes a day _____ times per week! 3. _x I agree to stop smoking during ___pregnancy______________ ( fill in the blank with activity)    4. __I would like to start medication to stop smoking __ OTC Nicotine  __Wellbutrin   __Chantix. Talk to your Doctor today! 5. __ I will investigate some of the Apps that are available to help me quit. Examples are  Quit Smoking-Quit Now and [employer], Stop Smoking help    6. __ Stop at the check-out desk when leaving to  a list of stop smoking resources! 7. Create your own goal!______i would like to quit before having baby______________________________________________      Quitting is hard!    What makes it hard for you to quit?____________ive been smoking for 13 yrs, stress___________________________________________     What steps can you take to overcome this?____________________________________________________________      On a scale of 1-10 how confident are you that you can meet the above goal?_4-5______      Date:______  Name_________________DOB:_________   DR:____________

## 2017-10-04 NOTE — PROGRESS NOTES
[facility]  Progress Note  Mary Samuel DO Pancho Hunter  1990    10/04/17    Chief Complaint:   Pancho Hunter is a 32 y.o. female who is here for follow-up on anxiety and chronic pain    HPI:   Has met with OB and saw a rheumatologist and cardiologist.  There is no Marfan's and hypoermobility. 9 weeks in pregancy. OB/GYN Dr. Mita Aparicio. Has been overwhelmed with the number of specialty visits to [facility] to include a cardiologist rheumatologist.  She is setting a goal to stop smoking during the pregnancies. She is 5 weeks gestational age. Tramadol as helpful. Finds that she is needing the clonazepam 3 times a day. ROS negative for headache, vision changes, chest pain, shortness of breath, abdominal pain, urinary sx, bowel changes. Past medical, surgical, and social history reviewed. Medications and allergies reviewed. Allergies   Allergen Reactions    Meloxicam Other (See Comments)     Prior to Visit Medications    Medication Sig Taking? Authorizing Provider   clonazePAM (KLONOPIN) 1 MG tablet Take 1 tab po q6h prn anxiety. Yes Maldonado Vargas, DO   citalopram (CELEXA) 20 MG tablet Take 1 tablet by mouth daily Yes Maldonado Vargas DO   traMADol (ULTRAM) 50 MG tablet Take 1 tab po TID prn pain.  Yes Maldonado Vargas, DO   DULoxetine (CYMBALTA) 30 MG extended release capsule To wean off Cymbalta take 1 capsule daily for days 1 to 3 then switch to citalopram (Celexa) Yes Maldonado Vargas, DO   acetaminophen (APAP EXTRA STRENGTH) 500 MG tablet Take 2 tablets by mouth 3 times daily as needed for Pain Yes Maldonado Vargas DO   ondansetron (ZOFRAN) 4 MG tablet Take 1 tablet by mouth daily as needed for Nausea or Vomiting Yes Maldonado Vargas DO   ARIPiprazole (ABILIFY) 5 MG tablet Take 1 tablet by mouth daily Yes Maldonado Vargas DO          Vitals:    10/04/17 0954   BP: 111/76   Site: Left Arm Clear to auscultation bilaterally good air entry bilaterally  No crackles, wheeze. Breathing comfortably. Psych: normal affect. Denies SI. Neuro: AOx3      ASSESSMENT  1. Generalized anxiety disorder  clonazePAM (KLONOPIN) 1 MG tablet    citalopram (CELEXA) 20 MG tablet    traMADol (ULTRAM) 50 MG tablet   2. Fibromyalgia syndrome  traMADol (ULTRAM) 50 MG tablet   3. 9 weeks gestation of pregnancy       Discussed the risk of these medications. Patient understands risk of smoking through pregnancy as well as use of benzodiazepines SSRI control through pregnancy. She seems to be taking a concerted effort to wean off the clonazepam and smoking. She is following up with her rheumatologist as patient informs me that the MICHAEL test was positive      PLAN          See rest of plan under patient instructions. Return in about 3 months (around 1/4/2018). Patient Instructions   1) Set goal to wean off clonazepam.    2) Strength of Celexa was increased to 20 mg so you won't need as much of clonazepam.  3) there are a number of physicians you are advised to follow-up with through [facility]. 4) set a goal to quit smoking during pregnancy. 5) follow-up in 2-3 months, sooner as needed. Please note a portion of this chart was generated using dragon dictation software. Although every effort was made to ensure the accuracy of this automated transcription, some errors in transcription may have occurred.

## 2017-11-30 ENCOUNTER — OFFICE VISIT (OUTPATIENT)
Dept: FAMILY MEDICINE CLINIC | Age: 27
End: 2017-11-30

## 2017-11-30 VITALS
HEART RATE: 100 BPM | DIASTOLIC BLOOD PRESSURE: 72 MMHG | RESPIRATION RATE: 20 BRPM | BODY MASS INDEX: 18.96 KG/M2 | HEIGHT: 69 IN | SYSTOLIC BLOOD PRESSURE: 105 MMHG | TEMPERATURE: 98.7 F | WEIGHT: 128 LBS | OXYGEN SATURATION: 100 %

## 2017-11-30 DIAGNOSIS — J98.8 RESPIRATORY TRACT INFECTION: Primary | ICD-10-CM

## 2017-11-30 DIAGNOSIS — Z3A.18 18 WEEKS GESTATION OF PREGNANCY: ICD-10-CM

## 2017-11-30 DIAGNOSIS — F17.219 CIGARETTE NICOTINE DEPENDENCE WITH NICOTINE-INDUCED DISORDER: ICD-10-CM

## 2017-11-30 PROCEDURE — 4004F PT TOBACCO SCREEN RCVD TLK: CPT | Performed by: FAMILY MEDICINE

## 2017-11-30 PROCEDURE — G8420 CALC BMI NORM PARAMETERS: HCPCS | Performed by: FAMILY MEDICINE

## 2017-11-30 PROCEDURE — 99213 OFFICE O/P EST LOW 20 MIN: CPT | Performed by: FAMILY MEDICINE

## 2017-11-30 PROCEDURE — G8427 DOCREV CUR MEDS BY ELIG CLIN: HCPCS | Performed by: FAMILY MEDICINE

## 2017-11-30 PROCEDURE — G8484 FLU IMMUNIZE NO ADMIN: HCPCS | Performed by: FAMILY MEDICINE

## 2017-11-30 RX ORDER — AZITHROMYCIN 250 MG/1
TABLET, FILM COATED ORAL
Qty: 1 PACKET | Refills: 0 | Status: SHIPPED | OUTPATIENT
Start: 2017-11-30 | End: 2017-12-10

## 2017-11-30 NOTE — PATIENT INSTRUCTIONS
1) Set a goal to wean off the Tramadol and take the ES acetaminophen. 2) What ever you can do to ease the cigarettes and stop permanently.   3) For sinuses take the Zpack and use steam.

## 2017-12-09 PROBLEM — F17.219 CIGARETTE NICOTINE DEPENDENCE WITH NICOTINE-INDUCED DISORDER: Status: ACTIVE | Noted: 2017-12-09

## 2017-12-10 NOTE — PROGRESS NOTES
rhythm, no murmur, rubs, gallops. No edema. Resp: Clear to auscultation bilaterally good air entry bilaterally  No crackles, wheeze. Breathing comfortably. Psych: normal affect. Neuro: AOx3      ASSESSMENT  1. Respiratory tract infection  azithromycin (ZITHROMAX Z-LELA) 250 MG tablet   2. 18 weeks gestation of pregnancy     3. Cigarette nicotine dependence with nicotine-induced disorder       The risks, benefits, potential side effects and barriers to medication use were addressed today. Understanding was acknowledged. Patient asked to follow-up if condition(s) do not improve as anticipated. #3: Encouraged cessation. Discussed with patient treatment options, and programs to help pt quit. Pt verbalizes understanding, aware of risks of persistent tobacco usage. Discussed potential harm to fetus. PLAN          See rest of plan under patient instructions. Return if symptoms worsen or fail to improve. Patient Instructions   1) Set a goal to wean off the Tramadol and take the ES acetaminophen. 2) What ever you can do to ease the cigarettes and stop permanently. 3) For sinuses take the Zpack and use steam.        Please note a portion of this chart was generated using dragon dictation software. Although every effort was made to ensure the accuracy of this automated transcription, some errors in transcription may have occurred.

## 2017-12-20 ENCOUNTER — OFFICE VISIT (OUTPATIENT)
Dept: PSYCHOLOGY | Age: 27
End: 2017-12-20

## 2017-12-20 DIAGNOSIS — F41.1 GENERALIZED ANXIETY DISORDER: Primary | ICD-10-CM

## 2017-12-20 DIAGNOSIS — F39 MOOD DISORDER (HCC): ICD-10-CM

## 2017-12-20 PROCEDURE — 90832 PSYTX W PT 30 MINUTES: CPT | Performed by: PSYCHOLOGIST

## 2017-12-20 ASSESSMENT — ANXIETY QUESTIONNAIRES
5. BEING SO RESTLESS THAT IT IS HARD TO SIT STILL: 2-OVER HALF THE DAYS
GAD7 TOTAL SCORE: 17
1. FEELING NERVOUS, ANXIOUS, OR ON EDGE: 3-NEARLY EVERY DAY
6. BECOMING EASILY ANNOYED OR IRRITABLE: 3-NEARLY EVERY DAY
7. FEELING AFRAID AS IF SOMETHING AWFUL MIGHT HAPPEN: 1-SEVERAL DAYS
2. NOT BEING ABLE TO STOP OR CONTROL WORRYING: 3-NEARLY EVERY DAY
4. TROUBLE RELAXING: 2-OVER HALF THE DAYS
3. WORRYING TOO MUCH ABOUT DIFFERENT THINGS: 3-NEARLY EVERY DAY

## 2017-12-20 ASSESSMENT — PATIENT HEALTH QUESTIONNAIRE - PHQ9
9. THOUGHTS THAT YOU WOULD BE BETTER OFF DEAD, OR OF HURTING YOURSELF: 0
6. FEELING BAD ABOUT YOURSELF - OR THAT YOU ARE A FAILURE OR HAVE LET YOURSELF OR YOUR FAMILY DOWN: 1
5. POOR APPETITE OR OVEREATING: 0
4. FEELING TIRED OR HAVING LITTLE ENERGY: 3
SUM OF ALL RESPONSES TO PHQ QUESTIONS 1-9: 16
8. MOVING OR SPEAKING SO SLOWLY THAT OTHER PEOPLE COULD HAVE NOTICED. OR THE OPPOSITE, BEING SO FIGETY OR RESTLESS THAT YOU HAVE BEEN MOVING AROUND A LOT MORE THAN USUAL: 1
3. TROUBLE FALLING OR STAYING ASLEEP: 3
2. FEELING DOWN, DEPRESSED OR HOPELESS: 3
SUM OF ALL RESPONSES TO PHQ9 QUESTIONS 1 & 2: 6
7. TROUBLE CONCENTRATING ON THINGS, SUCH AS READING THE NEWSPAPER OR WATCHING TELEVISION: 2
1. LITTLE INTEREST OR PLEASURE IN DOING THINGS: 3

## 2017-12-20 NOTE — PROGRESS NOTES
Behavioral Health Consultation  Yasmany Hogue Psy.D. Psychologist  12/20/2017  8:54 AM      Time spent with Patient: 30 minutes  This is patient's sixth [facility] appointment. Reason for Consult:  depression, anxiety, stress, chronic pain and opioid withdrawal  Referring Provider: DO Mendel Mulligan  [address] [place] [address], [address] [place] [address]      S:  Pt reported that she decided not to move to [place]. Reunited with her boyfriend Saul Lal, got pregnant in August. Physical pain has been very challenging to manage. Mood has been poor. Just wants to sleep to avoid pain. Switched from Cymbalta 60mg to Celexa 20mg, which does not feel like enough for her. Feels anxious much of the time. Hard to work. Has been blacking out at work, ended up in the ER. Still taking 3 clonazepam daily, which she feels guilty about but also feels like she needs them. Has cut back from 1ppd to 4-5 cigarettes daily. Wants to quit entirely during pregnancy but having trouble completely quitting. [place] recently that she has hypermobility and a strand of MICHAEL, so pt was put on medication to protect her muscles.        O:  MSE:  Appearance: good hygiene and appropriate attire  Attitude: cooperative, tearful and moderate distress  Consciousness: alert  Orientation: oriented to person, place, time, general circumstance  Memory: recent and remote memory intact  Attention/Concentration: intact during session    Psychomotor Activity: normal  Eye Contact: normal  Speech: normal rate and volume, well-articulated  Mood: dyshporic and anxious   Affect: congruent with thought content and mood   Perception: within normal limits  Thought Content: within normal limits   Thought Process: logical, goal-directed, coherent  Insight: improving  Judgment: intact  Morbid ideation: no  Suicide Assessment: no suicidal ideation, plan or intent      History:    Medications:   Current Outpatient Prescriptions   Medication Sig Dispense Refill    clonazePAM (KLONOPIN) 1 MG tablet Take 1 tab po q6h prn anxiety. 90 tablet 2    citalopram (CELEXA) 20 MG tablet Take 1 tablet by mouth daily 30 tablet 3    traMADol (ULTRAM) 50 MG tablet Take 1 tab po TID prn pain. 90 tablet 2    ondansetron (ZOFRAN) 4 MG tablet Take 1 tablet by mouth daily as needed for Nausea or Vomiting 30 tablet 5     No current facility-administered medications for this visit. Social History:   Social History     Social History    Marital status: Single     Spouse name: N/A    Number of children: N/A    Years of education: N/A     Occupational History    Not on file. Social History Main Topics    Smoking status: Current Every Day Smoker     Packs/day: 1.00    Smokeless tobacco: Never Used      Comment: has not smoked in 2 weeks due to not feeling well     Alcohol use No    Drug use: No    Sexual activity: Not on file     Other Topics Concern    Not on file     Social History Narrative    No narrative on file       TOBACCO:   reports that she has been smoking. She has been smoking about 1.00 pack per day. She has never used smokeless tobacco.  ETOH:   reports that she does not drink alcohol. Family History:   Family History   Problem Relation Age of Onset    Other Mother      mixed tissue disorder       A:  Pt's distress has increased since last visit in July 2017. Pt was very tearful and distress today while discussing worsening of her pain, mood, and anxiety during her current pregnancy. Discussed life changes she has made, including deciding to stay in [place], reuniting with her boyfriend, changing her medications to adjust to the pregnancy, and working on nicotine cessation. Reinforced pt's efforts to make lifestyle changes to adjust to her pregnancy. Challenged maladaptive thoughts related to her ability to manage worsening pain and distress during the remainder of her pregnancy (current around 20 weeks GA).  Explored positive aspects of her pregnancy and becoming a mother. Encouraged pt to utilize distraction techniques to reduce anxiety and cigarette use in the mornings. Discussed ways to utilize available resources to improve her ability to manage work (e.g., finding out about [employer], talking to her boss about making accommodations. Pt denied SI/HI, plan or intent. No safety concerns at this time. Pt is aware of this writer's upcoming maternity leave. She accepted a referral for traditional psychotherapy in the community, which was strongly recommended for her. PARISH 7 SCORE 12/20/2017 7/20/2017 6/22/2017   PARISH-7 Total Score 17 14 12     Interpretation of PARISH-7 score: 5-9 = mild anxiety, 10-14 = moderate anxiety, 15+ = severe anxiety. Recommend referral to behavioral health for scores 10 or greater.  (previous score - most recent 16, 19, 21)    PHQ Scores 12/20/2017 7/20/2017 6/22/2017 5/25/2017 4/17/2017 4/3/2017   PHQ2 Score 6 3 2 4 4 6   PHQ9 Score 16 12 12 12 23 27     Interpretation of Total Score Depression Severity: 1-4 = Minimal depression, 5-9 = Mild depression, 10-14 = Moderate depression, 15-19 = Moderately severe depression, 20-27 = Severe depression    Diagnosis:    1. Generalized anxiety disorder    2.  Mood disorder (HCC)    r/o substance-induced mood disorder    Patient Active Problem List   Diagnosis    Mood disorder ([facility] [address]    Acne    Myofascial pain    Neck swelling    History of sinus tachycardia    Tobacco use    Generalized anxiety disorder    Cigarette nicotine dependence with nicotine-induced disorder         Plan:  Pt interventions:  Supportive techniques, Emphasized self-care as important for managing overall health, CBT to target maladaptive thoughts, Identified relevant behavioral strategies for targeting symptom reduction including TIP, mindfulness strategies, increased focus on self-care, using distraction techniques in the mornings, continuing to reduce cigarette use, finding a therapist in the community, Emphasized

## 2017-12-21 ENCOUNTER — TELEPHONE (OUTPATIENT)
Dept: FAMILY MEDICINE CLINIC | Age: 27
End: 2017-12-21

## 2017-12-21 NOTE — TELEPHONE ENCOUNTER
Appreciate the update. Please call patient back and let her know that I want her to call today to schedule an appointment with a therapist at one of the referral options we discussed.

## 2017-12-21 NOTE — TELEPHONE ENCOUNTER
Pt called, stating that she was asked by her OB to call and update Dr Terrell Jack.    Pt states that RF should already be aware that she is 20 weeks pregnant, and \"not doing so well physically and mentally\". She states that she has called OB to be seen, and they will be getting back to her about being worked in for an overbooked appt later today. She supplied return call # of [phone], if any action suggested. Thank you!

## 2017-12-21 NOTE — TELEPHONE ENCOUNTER
Pt called back, and was informed of RF's message above. She has already contacted [facility], and will contact [facility] today to compare availability. She wanted to thank  for the moral support. (No further action required at present) Thanks!

## 2018-01-10 ENCOUNTER — OFFICE VISIT (OUTPATIENT)
Dept: FAMILY MEDICINE CLINIC | Age: 28
End: 2018-01-10

## 2018-01-10 VITALS
DIASTOLIC BLOOD PRESSURE: 69 MMHG | OXYGEN SATURATION: 98 % | WEIGHT: 134.6 LBS | HEART RATE: 100 BPM | SYSTOLIC BLOOD PRESSURE: 91 MMHG | TEMPERATURE: 97.7 F | BODY MASS INDEX: 19.88 KG/M2 | RESPIRATION RATE: 12 BRPM

## 2018-01-10 DIAGNOSIS — J06.9 RECENT UPPER RESPIRATORY TRACT INFECTION: Primary | ICD-10-CM

## 2018-01-10 DIAGNOSIS — F41.1 GENERALIZED ANXIETY DISORDER: ICD-10-CM

## 2018-01-10 DIAGNOSIS — H69.83 EUSTACHIAN TUBE DYSFUNCTION, BILATERAL: ICD-10-CM

## 2018-01-10 PROCEDURE — 99214 OFFICE O/P EST MOD 30 MIN: CPT | Performed by: FAMILY MEDICINE

## 2018-01-10 PROCEDURE — G8420 CALC BMI NORM PARAMETERS: HCPCS | Performed by: FAMILY MEDICINE

## 2018-01-10 PROCEDURE — G8427 DOCREV CUR MEDS BY ELIG CLIN: HCPCS | Performed by: FAMILY MEDICINE

## 2018-01-10 PROCEDURE — 4004F PT TOBACCO SCREEN RCVD TLK: CPT | Performed by: FAMILY MEDICINE

## 2018-01-10 PROCEDURE — G8484 FLU IMMUNIZE NO ADMIN: HCPCS | Performed by: FAMILY MEDICINE

## 2018-01-10 RX ORDER — AZITHROMYCIN 250 MG/1
TABLET, FILM COATED ORAL
Qty: 1 PACKET | Refills: 0 | Status: SHIPPED | OUTPATIENT
Start: 2018-01-10 | End: 2018-01-20

## 2018-01-10 RX ORDER — DULOXETIN HYDROCHLORIDE 60 MG/1
60 CAPSULE, DELAYED RELEASE ORAL DAILY
COMMUNITY
End: 2018-06-11 | Stop reason: SDUPTHER

## 2018-01-10 RX ORDER — CLONAZEPAM 1 MG/1
TABLET ORAL
Qty: 90 TABLET | Refills: 2 | Status: SHIPPED | OUTPATIENT
Start: 2018-01-10 | End: 2018-03-30 | Stop reason: SDUPTHER

## 2018-01-10 NOTE — PROGRESS NOTES
going to worry about this problem because there is nothing I can do about it right now.           Please note a portion of this chart was generated using dragon dictation software. Although every effort was made to ensure the accuracy of this automated transcription, some errors in transcription may have occurred. Cigarette smoking is a preventable cause of death in the [place]. If you have thought about quitting but haven't been able to, here are some reasons why you should and some ways to do it. Here's Why   Quitting smoking now can decrease your risk of getting smoking-related illnesses like:   Heart disease   Stroke   Several types of cancer, including:   Lung   Mouth   Esophagus   Larynx   Bladder   Pancreas   Kidney   Chronic lung diseases:   Bronchitis   Emphysema   Asthma   Cataracts   Macular degeneration   Thyroid conditions   Hearing loss   Erectile dysfunction   Dementia   Osteoporosis   Here's How   Once you've decided to quit smoking, set your target quit date a few weeks away. In the time leading up to your quit day, try some of these ideas offered by the [address] to help you successfully quit smoking. For the best results, work with your doctor. Together, you can test your lung function and compare the results to those of a nonsmoking person. The results can be given to you as your lung age. Finding out your lung age right after having the test done may help you to stop smoking. Your doctor can also discuss with you all of your options and refer you to smoking-cessation support groups. You may wish to use nicotine replacement (gum, patches, inhaler) or one of the prescription medications that have been shown to increase quit rates and prolong abstinence from smoking. But whatever you and your doctor decide on these matters, it will still be you who decides when an how to quit.  Here are some techniques:   Switch Brands   Switch yourself heladio may be surprised how much you can enjoy their scent now. The first few days after you quit, spend as much free time as possible in places where smoking isn't allowed, such as [address] [place] [address], museums, theaters, department stores, and churches. Drink large quantities of water and fruit juice (but avoid sodas that contain caffeine). Try to avoid alcohol, coffee, and other beverages that you associate with cigarette smoking. Strike up conversation instead of a match for a cigarette. If you miss the sensation of having a cigarette in your hand, play with something elsea pencil, a paper clip, a marble. If you miss having something in your mouth, try toothpicks or a fake cigarette. Here are some useful phone numbers and resources for you to help your smoking cessation. [address] [place] [address] Street: [phone]  Smoking cessation programs in [facility]: [phone]  \"Freshstart\" a 4-session program for smoking cessation - group sessions    [facility] Use Prevention and Control Foundation: 1800-QUIT-NOW     [facility]: 800-163-EPCR  \"Freshstart' - 4-session program for smoking cessation - group sessions    [facility]: [phone]  Personal Smoking cessation consultations - teens and adults  [facility] By appointment $032    [facility]: [phone]  Offers individual hypnosis, behavior modifications, and counseling in this program. Three sessions over 2-week period  $155 (total cost)    Nicotine Anonymous: [phone]  Open group cessation - everyone welcome     American Cancer Society: 217.317.3385    American Lung Association: 1-800-LUNG-[place]    On-line help:  www.cancer. org  www.quitnet. org  www. whyquit. com  www.stopsmokingsuppport. com  www. ffsonline. org

## 2018-01-10 NOTE — PROGRESS NOTES
[facility]  Progress Note  Kim Sexton DO          Sanam Rasheed  1990    01/10/18    Chief Complaint:   Sanam Rasheed is a 32 y.o. female who is here for cough    HPI:   c/o cough- productive- yellow, nasal cinda- yellow mucus, h/a,  no fever. sxs x 3weeks. pt. is 23 weeks pregnant. Has had to miss work due to URI. Accompanied by Soha Felix. ROS negative for headache, vision changes, chest pain, shortness of breath, abdominal pain, urinary sx, bowel changes. Past medical, surgical, and social history reviewed. Medications and allergies reviewed. Allergies   Allergen Reactions    Meloxicam Other (See Comments)     Prior to Visit Medications    Medication Sig Taking? Authorizing Provider   DULoxetine (CYMBALTA) 60 MG extended release capsule Take 60 mg by mouth daily Yes Historical Provider, MD   azithromycin (ZITHROMAX Z-LELA) 250 MG tablet Take 2 tablets on day 1, then 1 tablet daily for the next 4 days. Yes Rajendra Vargas DO   clonazePAM (KLONOPIN) 1 MG tablet Take 1 tab po q6h prn anxiety. Rico Vargas, DO   traMADol (ULTRAM) 50 MG tablet Take 1 tab po TID prn pain.  Yes Rajendra Vargas DO   citalopram (CELEXA) 20 MG tablet Take 1 tablet by mouth daily  Rajendra Vargas DO   ondansetron (ZOFRAN) 4 MG tablet Take 1 tablet by mouth daily as needed for Nausea or Vomiting  Rajendra Vargas DO          Vitals:    01/10/18 0747   BP: 91/69   Pulse: 100   Resp: 12   Temp: 97.7 °F (36.5 °C)   TempSrc: Oral   SpO2: 98%   Weight: 134 lb 9.6 oz (61.1 kg)      Wt Readings from Last 3 Encounters:   01/10/18 134 lb 9.6 oz (61.1 kg)   11/30/17 128 lb (58.1 kg)   10/04/17 125 lb 9.6 oz (57 kg)     BP Readings from Last 3 Encounters:   01/10/18 91/69   11/30/17 105/72   10/04/17 111/76       Patient Active Problem List   Diagnosis    Mood disorder (HCC)    Acne    Myofascial pain    Neck swelling    History of sinus tachycardia    Tobacco use    Generalized anxiety disorder    Cigarette nicotine dependence with nicotine-induced disorder           There is no immunization history on file for this patient. Past Medical History:   Diagnosis Date    Seasonal allergies      Past Surgical History:   Procedure Laterality Date    TONSILLECTOMY  2007     Family History   Problem Relation Age of Onset    Other Mother      mixed tissue disorder     Social History     Social History    Marital status: Single     Spouse name: N/A    Number of children: N/A    Years of education: N/A     Occupational History    Not on file. Social History Main Topics    Smoking status: Current Every Day Smoker     Packs/day: 1.00    Smokeless tobacco: Never Used      Comment: has not smoked in 2 weeks due to not feeling well     Alcohol use No    Drug use: No    Sexual activity: Not on file     Other Topics Concern    Not on file     Social History Narrative    No narrative on file       O: BP 91/69   Pulse 100   Temp 97.7 °F (36.5 °C) (Oral)   Resp 12   Wt 134 lb 9.6 oz (61.1 kg)   LMP 07/30/2017   SpO2 98%   Breastfeeding? No   BMI 19.88 kg/m²   Physical Exam  GEN: No acute distress, cooperative, well nourished, alert. HEENT: PEERLA, EOMI , normocephalic/atraumatic, nares and oropharynx clear. Mucus membranes normal, Tympanic membranes clear bilaterally. Neck: soft, supple, no thyromegaly, mass, no Lymphadenopathy  CV: Regular rate and rhythm, no murmur, rubs, gallops. No edema. Resp: Clear to auscultation bilaterally good air entry bilaterally  No crackles, wheeze. Breathing comfortably. Psych: dysthymic affect. Denies SI/HI. Neuro: AOx3      ASSESSMENT  1. Recent upper respiratory tract infection  azithromycin (ZITHROMAX Z-LELA) 250 MG tablet   2. Eustachian tube dysfunction, bilateral     3.  Generalized anxiety disorder  clonazePAM (KLONOPIN) 1 MG tablet       The risks, benefits, potential side effects and barriers to medication use were addressed today. Understanding was acknowledged. Patient asked to follow-up if condition(s) do not improve as anticipated. I discussed utility of antibiotics, their possible side effects (notably antibiotic-associated diarrhea and candidiasis) and the risk to contribute to antibiotic-resistance strains of bacteria. The patient is to take after symptoms persist for more than 1 week and if experiencing the key symptoms and signs as discussed in the clinic visit. PLAN          See rest of plan under patient instructions. Return in about 3 months (around 4/10/2018). Patient Instructions   Worry Management     The following strategies may be used to deal with your worries when you feel that they are becoming overwhelming. 1. Worry Place and Time. Set a 30-minute worry period that will take place at the same time and same place each day. Your worry place and time will be:____________    2. Delay Worry. If you notice you are worrying outside of your scheduled worry time, tell yourself, I have plenty of time to focus on this later. Right now Im just going to be in the moment and notice what Im doing, what others are doing, the environment, and other things I see, hear, or smell.      3. Worry Log. Record all your worries during your worry time on the Worry Log on the following page and then take time to categorize these worries. You can choose categories that are helpful for you. You might organize them by Big Concerns, Medium Concerns, and Small Concerns.  Another option would be to categorize them by content area, such as Work Concerns, Family Concerns, Financial Concerns, and Relationship Concerns.  Any means of categorizing can be used; however, it is important not to use too many categories; usually between three and seven work best.     In the next column of your worry log, you can write how you will manage the problem.  If the problem is something you have absolutely

## 2018-01-10 NOTE — PATIENT INSTRUCTIONS
Worry Management     The following strategies may be used to deal with your worries when you feel that they are becoming overwhelming. 1. Worry Place and Time. Set a 30-minute worry period that will take place at the same time and same place each day. Your worry place and time will be:____________    2. Delay Worry. If you notice you are worrying outside of your scheduled worry time, tell yourself, I have plenty of time to focus on this later. Right now Im just going to be in the moment and notice what Im doing, what others are doing, the environment, and other things I see, hear, or smell.      3. Worry Log. Record all your worries during your worry time on the Worry Log on the following page and then take time to categorize these worries. You can choose categories that are helpful for you. You might organize them by Big Concerns, Medium Concerns, and Small Concerns.  Another option would be to categorize them by content area, such as Work Concerns, Family Concerns, Financial Concerns, and Relationship Concerns.  Any means of categorizing can be used; however, it is important not to use too many categories; usually between three and seven work best.     In the next column of your worry log, you can write how you will manage the problem. If the problem is something you have absolutely no control over, you might write down, Im not going to worry about this problem because there is nothing I can do about it right now. 

## 2018-01-26 ENCOUNTER — OFFICE VISIT (OUTPATIENT)
Dept: FAMILY MEDICINE CLINIC | Age: 28
End: 2018-01-26

## 2018-01-26 VITALS
BODY MASS INDEX: 20.59 KG/M2 | SYSTOLIC BLOOD PRESSURE: 102 MMHG | HEART RATE: 93 BPM | WEIGHT: 139 LBS | OXYGEN SATURATION: 100 % | DIASTOLIC BLOOD PRESSURE: 70 MMHG | HEIGHT: 69 IN

## 2018-01-26 DIAGNOSIS — M79.18 MYOFASCIAL PAIN: Primary | ICD-10-CM

## 2018-01-26 PROCEDURE — G8427 DOCREV CUR MEDS BY ELIG CLIN: HCPCS | Performed by: FAMILY MEDICINE

## 2018-01-26 PROCEDURE — G8484 FLU IMMUNIZE NO ADMIN: HCPCS | Performed by: FAMILY MEDICINE

## 2018-01-26 PROCEDURE — 99213 OFFICE O/P EST LOW 20 MIN: CPT | Performed by: FAMILY MEDICINE

## 2018-01-26 PROCEDURE — 4004F PT TOBACCO SCREEN RCVD TLK: CPT | Performed by: FAMILY MEDICINE

## 2018-01-26 PROCEDURE — G8420 CALC BMI NORM PARAMETERS: HCPCS | Performed by: FAMILY MEDICINE

## 2018-01-26 RX ORDER — MELATONIN
1000
COMMUNITY
End: 2019-05-02

## 2018-01-26 RX ORDER — LIDOCAINE 50 MG/G
PATCH TOPICAL
Qty: 60 PATCH | Refills: 1 | Status: SHIPPED | OUTPATIENT
Start: 2018-01-26 | End: 2018-07-23

## 2018-01-26 NOTE — PATIENT INSTRUCTIONS
about it right now.               Patient Education        Stopping Smoking: Care Instructions  Your Care Instructions    Cigarette smokers crave the nicotine in cigarettes. Giving it up is much harder than simply changing a habit. Your body has to stop craving the nicotine. It is hard to quit, but you can do it. There are many tools that people use to quit smoking. You may find that combining tools works best for you. There are several steps to quitting. First you get ready to quit. Then you get support to help you. After that, you learn new skills and behaviors to become a nonsmoker. For many people, a necessary step is getting and using medicine. Your doctor will help you set up the plan that best meets your needs. You may want to attend a smoking cessation program to help you quit smoking. When you choose a program, look for one that has proven success. Ask your doctor for ideas. You will greatly increase your chances of success if you take medicine as well as get counseling or join a cessation program.  Some of the changes you feel when you first quit tobacco are uncomfortable. Your body will miss the nicotine at first, and you may feel short-tempered and grumpy. You may have trouble sleeping or concentrating. Medicine can help you deal with these symptoms. You may struggle with changing your smoking habits and rituals. The last step is the tricky one: Be prepared for the smoking urge to continue for a time. This is a lot to deal with, but keep at it. You will feel better. Follow-up care is a key part of your treatment and safety. Be sure to make and go to all appointments, and call your doctor if you are having problems. It's also a good idea to know your test results and keep a list of the medicines you take. How can you care for yourself at home? · Ask your family, friends, and coworkers for support. You have a better chance of quitting if you have help and support.   · Join a support group, such as appointments, and call your doctor if you are having problems. It's also a good idea to know your test results and keep a list of the medicines you take. How can you tell if belly pain is a sign of labor? When belly pain is caused by labor, it can feel like mild or menstrual-like cramps in your lower belly. These cramps are probably contractions. They can happen in your second or third trimester. You may also have:  · A steady, dull ache in your lower back, pelvis, or thighs. · A feeling of pressure in your pelvis or lower belly. · Changes in your vaginal discharge or a sudden release of fluid from the vagina. If you think you are in labor, call your doctor. How can you care for yourself at home? When belly pain is mild and is not a symptom of labor:  · Rest until you feel better. · Take a warm bath. · Think about what you drink and eat:  ¨ Drink plenty of fluids. Choose water and other caffeine-free clear liquids until you feel better. ¨ Try eating small, frequent meals. If your stomach is upset, try bland, low-fat foods like plain rice, broiled chicken, toast, and yogurt. · Think about how you move if you are having brief pains from stretching of the round ligaments. ¨ Try gentle stretching. ¨ Move a little more slowly when turning in bed or getting up from a chair, so those ligaments don't stretch quickly. ¨ Lean forward a bit if you think you are going to cough or sneeze. When should you call for help? Call 911 anytime you think you may need emergency care. For example, call if:  ? · You have sudden, severe pain in your belly. ? · You have severe vaginal bleeding. ?Call your doctor now or seek immediate medical care if:  ? · You have new or worse belly pain or cramping. ? · You have any vaginal bleeding. ? · You have a fever. ? · You have symptoms of preeclampsia, such as:  ¨ Sudden swelling of your face, hands, or feet. ¨ New vision problems (such as dimness or blurring).   ¨ A severe

## 2018-01-29 ENCOUNTER — TELEPHONE (OUTPATIENT)
Dept: ORTHOPEDIC SURGERY | Age: 28
End: 2018-01-29

## 2018-01-29 NOTE — TELEPHONE ENCOUNTER
PA came back denied I called and spoke with a rep and explained to them it did not let you put in failed treatments, so he told me to attach information to a new PA and put re-review. This has been done and sent back to the plan.

## 2018-01-29 NOTE — TELEPHONE ENCOUNTER
Received a PA request through [facility] for Lidocaine 5% patches. Started PA and will get a response within 72 hours.     CMM code: [id number]    Case ID: [id number]

## 2018-01-30 NOTE — TELEPHONE ENCOUNTER
Denied on January 29   Request Reference Number: [id number]. LIDOCAINE PAD 5%is denied for not meeting the prior authorization requirement(s). For further questions, call [phone]      I will fax denial letter to the office for the physician to review.

## 2018-01-31 NOTE — TELEPHONE ENCOUNTER
Prior Authorization for medication, Lidocaine has been DENIED by insurance [facility] due to it did not meet the established medical necessity criteria or guidelines at this time. Please advise on appeal or change in medications. Please see attached scanned denial letter for more information.

## 2018-02-04 NOTE — PROGRESS NOTES
[facility]  Progress Note  [facility], [place]          Norah Sales  1990 02/04/18    Chief Complaint:   Norah Sales is a 32 y.o. female who is here for chronic pain. HPI:   Is in 3rd trimester. Has been experiencing chronic pain secondary to fibromyalgia. She is taking 1 Tramadol daily. Finds this is not enough to help with fibromyalgia pain. Wants to limit her Tramadol to shorten the withdrawal syndrome. ROS negative for headache, vision changes, chest pain, shortness of breath, abdominal pain, urinary sx, bowel changes. Past medical, surgical, and social history reviewed. Medications and allergies reviewed. Allergies   Allergen Reactions    Meloxicam Other (See Comments)     Prior to Visit Medications    Medication Sig Taking? Authorizing Provider   Cholecalciferol (VITAMIN D3) 1000 units TABS Take 1,000 Units by mouth Yes Historical Provider, MD   Prenatal Multivit-Min-Fe-FA (PRENATAL 1 + IRON PO) Take by mouth Yes Historical Provider, MD   Hydroxychloroquine Sulfate (PLAQUENIL PO) Take by mouth 1.5 tabs daily Yes Historical Provider, MD   lidocaine (LIDODERM) 5 % Apply up to 3 patches in painful areas up to 12 hr/day- may cut to size. Yes Sherrell Vargas, DO   DULoxetine (CYMBALTA) 60 MG extended release capsule Take 60 mg by mouth daily Yes Historical Provider, MD   clonazePAM (KLONOPIN) 1 MG tablet Take 1 tab po q6h prn anxiety. Eugenie Vargas, DO   traMADol (ULTRAM) 50 MG tablet Take 1 tab po TID prn pain.  Yes Sherrell Vargas DO   ondansetron (ZOFRAN) 4 MG tablet Take 1 tablet by mouth daily as needed for Nausea or Vomiting Yes Sherrell Vargas DO          Vitals:    01/26/18 0949   BP: 102/70   Site: Right Arm   Position: Sitting   Cuff Size: Medium Adult   Pulse: 93   SpO2: 100%   Weight: 139 lb (63 kg)   Height: 5' 9\" (1.753 m)      Wt Readings from Last 3 Encounters:   01/26/18 139 lb (63 kg)   01/10/18 134 lb Relationship Concerns.  Any means of categorizing can be used; however, it is important not to use too many categories; usually between three and seven work best.     In the next column of your worry log, you can write how you will manage the problem. If the problem is something you have absolutely no control over, you might write down, Im not going to worry about this problem because there is nothing I can do about it right now.               Patient Education        Stopping Smoking: Care Instructions  Your Care Instructions    Cigarette smokers crave the nicotine in cigarettes. Giving it up is much harder than simply changing a habit. Your body has to stop craving the nicotine. It is hard to quit, but you can do it. There are many tools that people use to quit smoking. You may find that combining tools works best for you. There are several steps to quitting. First you get ready to quit. Then you get support to help you. After that, you learn new skills and behaviors to become a nonsmoker. For many people, a necessary step is getting and using medicine. Your doctor will help you set up the plan that best meets your needs. You may want to attend a smoking cessation program to help you quit smoking. When you choose a program, look for one that has proven success. Ask your doctor for ideas. You will greatly increase your chances of success if you take medicine as well as get counseling or join a cessation program.  Some of the changes you feel when you first quit tobacco are uncomfortable. Your body will miss the nicotine at first, and you may feel short-tempered and grumpy. You may have trouble sleeping or concentrating. Medicine can help you deal with these symptoms. You may struggle with changing your smoking habits and rituals. The last step is the tricky one: Be prepared for the smoking urge to continue for a time. This is a lot to deal with, but keep at it. You will feel better.   Follow-up care is a key part of your treatment and safety. Be sure to make and go to all appointments, and call your doctor if you are having problems. It's also a good idea to know your test results and keep a list of the medicines you take. How can you care for yourself at home? · Ask your family, friends, and coworkers for support. You have a better chance of quitting if you have help and support. · Join a support group, such as Nicotine Anonymous, for people who are trying to quit smoking. · Consider signing up for a smoking cessation program, such as the American Lung Association's Freedom from Smoking program.  · Set a quit date. Pick your date carefully so that it is not right in the middle of a big deadline or stressful time. Once you quit, do not even take a puff. Get rid of all ashtrays and lighters after your last cigarette. Clean your house and your clothes so that they do not smell of smoke. · Learn how to be a nonsmoker. Think about ways you can avoid those things that make you reach for a cigarette. ¨ Avoid situations that put you at greatest risk for smoking. For some people, it is hard to have a drink with friends without smoking. For others, they might skip a coffee break with coworkers who smoke. ¨ Change your daily routine. Take a different route to work or eat a meal in a different place. · Cut down on stress. Calm yourself or release tension by doing an activity you enjoy, such as reading a book, taking a hot bath, or gardening. · Talk to your doctor or pharmacist about nicotine replacement therapy, which replaces the nicotine in your body. You still get nicotine but you do not use tobacco. Nicotine replacement products help you slowly reduce the amount of nicotine you need.  These products come in several forms, many of them available over-the-counter:  ¨ Nicotine patches  ¨ Nicotine gum and lozenges  ¨ Nicotine inhaler  · Ask your doctor about bupropion (Wellbutrin) or varenicline (Chantix), which are prescription medicines. They do not contain nicotine. They help you by reducing withdrawal symptoms, such as stress and anxiety. · Some people find hypnosis, acupuncture, and massage helpful for ending the smoking habit. · Eat a healthy diet and get regular exercise. Having healthy habits will help your body move past its craving for nicotine. · Be prepared to keep trying. Most people are not successful the first few times they try to quit. Do not get mad at yourself if you smoke again. Make a list of things you learned and think about when you want to try again, such as next week, next month, or next year. Where can you learn more? Go to https://Replica Labspepiceweb.aBIZinaBOX. org and sign in to your [employer] account. Enter B671 in the Cubresa box to learn more about \"Stopping Smoking: Care Instructions. \"     If you do not have an account, please click on the \"Sign Up Now\" link. Current as of: March 20, 2017  Content Version: 11.5  © 6044-8591 [employer]. Care instructions adapted under license by [facility] ([facility]). If you have questions about a medical condition or this instruction, always ask your healthcare professional. [place] [address] any warranty or liability for your use of this information. Patient Education        Belly Pain in Pregnancy: Care Instructions  Your Care Instructions    When you're pregnant, any belly pain can be a worry. You may not want to call your doctor about every pain you have. But you don't want to miss something that is dangerous for you or your baby. Even if it feels familiar, belly pain can mean something new when you're pregnant. It's important to know when to call your doctor. It will also help to know how to care for yourself at home when your pain is not caused by anything harmful. · When belly pain is more severe or constant, see a doctor right away.   · If you're sure your belly pain is a sign of labor, call your like plain rice, broiled chicken, toast, and yogurt. · Think about how you move if you are having brief pains from stretching of the round ligaments. ¨ Try gentle stretching. ¨ Move a little more slowly when turning in bed or getting up from a chair, so those ligaments don't stretch quickly. ¨ Lean forward a bit if you think you are going to cough or sneeze. When should you call for help? Call 911 anytime you think you may need emergency care. For example, call if:  ? · You have sudden, severe pain in your belly. ? · You have severe vaginal bleeding. ?Call your doctor now or seek immediate medical care if:  ? · You have new or worse belly pain or cramping. ? · You have any vaginal bleeding. ? · You have a fever. ? · You have symptoms of preeclampsia, such as:  ¨ Sudden swelling of your face, hands, or feet. ¨ New vision problems (such as dimness or blurring). ¨ A severe headache. ? · You think that you may be in labor. This means that you've had at least 8 contractions within 1 hour or at least 4 contractions within 20 minutes, even after you change your position and drink fluids. ? · You have symptoms of a urinary tract infection. These may include:  ¨ Pain or burning when you urinate. ¨ A frequent need to urinate without being able to pass much urine. ¨ Pain in the flank, which is just below the rib cage and above the waist on either side of the back. ¨ Blood in your urine. ? Watch closely for changes in your health, and be sure to contact your doctor if you are worried about your or your baby's health. Where can you learn more? Go to https://Like.comlukas.healthTailored Fit. org and sign in to your Fuzz account. Enter 585 [id number] 041 in the Dymant box to learn more about \"Belly Pain in Pregnancy: Care Instructions. \"     If you do not have an account, please click on the \"Sign Up Now\" link. Current as of: March 16, 2017  Content Version: 11.5  © 7325-1784 Health[employer], vozero. Care instructions adapted under license by [facility] ([facility]). If you have questions about a medical condition or this instruction, always ask your healthcare professional. [address] any warranty or liability for your use of this information. Please note a portion of this chart was generated using dragon dictation software. Although every effort was made to ensure the accuracy of this automated transcription, some errors in transcription may have occurred.

## 2018-02-08 ENCOUNTER — TELEPHONE (OUTPATIENT)
Dept: FAMILY MEDICINE CLINIC | Age: 28
End: 2018-02-08

## 2018-02-08 NOTE — TELEPHONE ENCOUNTER
Mother calling states she is trying to find daughter a counselor since Dr. Tc Orozco is on maturity leave. Pt. Is feeling overwhelmed. Not suicidal or homicidal. Gave mother names of santiago and  counseling and psych.

## 2018-02-10 DIAGNOSIS — M79.7 FIBROMYALGIA SYNDROME: ICD-10-CM

## 2018-02-10 DIAGNOSIS — F41.1 GENERALIZED ANXIETY DISORDER: ICD-10-CM

## 2018-02-12 DIAGNOSIS — F41.1 GENERALIZED ANXIETY DISORDER: ICD-10-CM

## 2018-02-12 DIAGNOSIS — M79.7 FIBROMYALGIA SYNDROME: ICD-10-CM

## 2018-02-13 RX ORDER — TRAMADOL HYDROCHLORIDE 50 MG/1
TABLET ORAL
Qty: 90 TABLET | Refills: 0 | Status: SHIPPED | OUTPATIENT
Start: 2018-02-13 | End: 2018-05-25 | Stop reason: SDUPTHER

## 2018-02-14 RX ORDER — TRAMADOL HYDROCHLORIDE 50 MG/1
TABLET ORAL
Qty: 90 TABLET | Refills: 2 | Status: SHIPPED | OUTPATIENT
Start: 2018-02-14 | End: 2018-05-15

## 2018-03-29 ENCOUNTER — TELEPHONE (OUTPATIENT)
Dept: FAMILY MEDICINE CLINIC | Age: 28
End: 2018-03-29

## 2018-03-29 NOTE — TELEPHONE ENCOUNTER
Pt's mother, April called. Pt would like to know if her baby can become a new pt of Dr. Rebekah Bernard as she will be born soon. Please advise. Thanks.   C: [phone]

## 2018-03-30 ENCOUNTER — OFFICE VISIT (OUTPATIENT)
Dept: FAMILY MEDICINE CLINIC | Age: 28
End: 2018-03-30

## 2018-03-30 VITALS
RESPIRATION RATE: 16 BRPM | OXYGEN SATURATION: 98 % | HEART RATE: 102 BPM | SYSTOLIC BLOOD PRESSURE: 110 MMHG | WEIGHT: 151.4 LBS | DIASTOLIC BLOOD PRESSURE: 76 MMHG | BODY MASS INDEX: 22.36 KG/M2 | TEMPERATURE: 97.6 F

## 2018-03-30 DIAGNOSIS — J02.9 SORE THROAT: ICD-10-CM

## 2018-03-30 DIAGNOSIS — K21.9 GASTROESOPHAGEAL REFLUX DISEASE, ESOPHAGITIS PRESENCE NOT SPECIFIED: Primary | ICD-10-CM

## 2018-03-30 DIAGNOSIS — F41.1 GENERALIZED ANXIETY DISORDER: ICD-10-CM

## 2018-03-30 LAB — S PYO AG THROAT QL: NORMAL

## 2018-03-30 PROCEDURE — 4004F PT TOBACCO SCREEN RCVD TLK: CPT | Performed by: FAMILY MEDICINE

## 2018-03-30 PROCEDURE — 87880 STREP A ASSAY W/OPTIC: CPT | Performed by: FAMILY MEDICINE

## 2018-03-30 PROCEDURE — 99213 OFFICE O/P EST LOW 20 MIN: CPT | Performed by: FAMILY MEDICINE

## 2018-03-30 PROCEDURE — G8482 FLU IMMUNIZE ORDER/ADMIN: HCPCS | Performed by: FAMILY MEDICINE

## 2018-03-30 PROCEDURE — G8427 DOCREV CUR MEDS BY ELIG CLIN: HCPCS | Performed by: FAMILY MEDICINE

## 2018-03-30 PROCEDURE — G8420 CALC BMI NORM PARAMETERS: HCPCS | Performed by: FAMILY MEDICINE

## 2018-03-30 RX ORDER — RANITIDINE 150 MG/1
150 TABLET ORAL 2 TIMES DAILY
Qty: 60 TABLET | Refills: 2 | Status: SHIPPED | OUTPATIENT
Start: 2018-03-30 | End: 2018-07-23

## 2018-03-30 RX ORDER — CLONAZEPAM 1 MG/1
TABLET ORAL
Qty: 90 TABLET | Refills: 2 | Status: CANCELLED | OUTPATIENT
Start: 2018-03-30 | End: 2018-06-28

## 2018-03-30 NOTE — PROGRESS NOTES
Subjective:      Patient ID: Jett Florentino is a 32 y.o. female. HPI  Maura Miles is here evaluation for a URI    She complains of fullness and pain in the ears and globus sensation at night. ST worse at night. Dry cough. No fever, wheezing or chest tightness. No nasal congestion. Mild rhinorrhea. She has heartburn few times a day to every day. Wakes her up. No dysphagia.  + water brash  Symptoms present most of pregnancy, worse the past few weeks. Has cut smoking to 5 a day. Outpatient Prescriptions Marked as Taking for the 3/30/18 encounter (Office Visit) with Jelly Erazo MD   Medication Sig Dispense Refill    traMADol (ULTRAM) 50 MG tablet Take 1 tab po TID prn pain. . 90 tablet 2    Cholecalciferol (VITAMIN D3) 1000 units TABS Take 1,000 Units by mouth      Prenatal Multivit-Min-Fe-FA (PRENATAL 1 + IRON PO) Take by mouth      Hydroxychloroquine Sulfate (PLAQUENIL PO) Take by mouth 1.5 tabs daily      lidocaine (LIDODERM) 5 % Apply up to 3 patches in painful areas up to 12 hr/day- may cut to size. 60 patch 1    DULoxetine (CYMBALTA) 60 MG extended release capsule Take 60 mg by mouth daily      clonazePAM (KLONOPIN) 1 MG tablet Take 1 tab po q6h prn anxiety. . 90 tablet 2    ondansetron (ZOFRAN) 4 MG tablet Take 1 tablet by mouth daily as needed for Nausea or Vomiting 30 tablet 5      Patient Active Problem List   Diagnosis    Mood disorder (HCC)    Acne    Myofascial pain    Neck swelling    History of sinus tachycardia    Tobacco use    Generalized anxiety disorder    Cigarette nicotine dependence with nicotine-induced disorder      PMH, PSH, SH, Problem list reviewed. Social History   Substance Use Topics    Smoking status: Current Every Day Smoker     Packs/day: 0.15    Smokeless tobacco: Never Used    Alcohol use No     Review of Systems    Objective:   Physical Exam  NAD   Skin is warm and dry. Well hydrated, no rash.   Ear exam - bilateral normal, TM intact without

## 2018-03-30 NOTE — TELEPHONE ENCOUNTER
Called and spoke with pt mother April and she acknowledges that Dr. Chadd Bob is ok with seeing Kyra's  once she is born.

## 2018-04-01 RX ORDER — CLONAZEPAM 1 MG/1
TABLET ORAL
Qty: 90 TABLET | Refills: 0 | Status: SHIPPED | OUTPATIENT
Start: 2018-04-10 | End: 2018-05-14 | Stop reason: SDUPTHER

## 2018-04-04 ENCOUNTER — OFFICE VISIT (OUTPATIENT)
Dept: FAMILY MEDICINE CLINIC | Age: 28
End: 2018-04-04

## 2018-04-04 VITALS
WEIGHT: 152.2 LBS | OXYGEN SATURATION: 99 % | TEMPERATURE: 96.6 F | RESPIRATION RATE: 16 BRPM | DIASTOLIC BLOOD PRESSURE: 77 MMHG | BODY MASS INDEX: 22.48 KG/M2 | HEART RATE: 97 BPM | SYSTOLIC BLOOD PRESSURE: 107 MMHG

## 2018-04-04 DIAGNOSIS — Z3A.35 35 WEEKS GESTATION OF PREGNANCY: ICD-10-CM

## 2018-04-04 DIAGNOSIS — J00 ACUTE NASOPHARYNGITIS: Primary | ICD-10-CM

## 2018-04-04 DIAGNOSIS — H69.83 DYSFUNCTION OF BOTH EUSTACHIAN TUBES: ICD-10-CM

## 2018-04-04 DIAGNOSIS — M79.7 FIBROMYALGIA: ICD-10-CM

## 2018-04-04 PROCEDURE — G8427 DOCREV CUR MEDS BY ELIG CLIN: HCPCS | Performed by: FAMILY MEDICINE

## 2018-04-04 PROCEDURE — G8420 CALC BMI NORM PARAMETERS: HCPCS | Performed by: FAMILY MEDICINE

## 2018-04-04 PROCEDURE — 99214 OFFICE O/P EST MOD 30 MIN: CPT | Performed by: FAMILY MEDICINE

## 2018-04-04 PROCEDURE — 4004F PT TOBACCO SCREEN RCVD TLK: CPT | Performed by: FAMILY MEDICINE

## 2018-04-04 RX ORDER — AZITHROMYCIN 250 MG/1
TABLET, FILM COATED ORAL
Qty: 1 PACKET | Refills: 0 | Status: SHIPPED | OUTPATIENT
Start: 2018-04-04 | End: 2018-04-14

## 2018-04-04 RX ORDER — HYDROCODONE BITARTRATE AND ACETAMINOPHEN 5; 325 MG/1; MG/1
TABLET ORAL
Qty: 12 TABLET | Refills: 0 | Status: SHIPPED | OUTPATIENT
Start: 2018-04-04 | End: 2018-09-10 | Stop reason: SDUPTHER

## 2018-04-04 RX ORDER — FLUTICASONE PROPIONATE 50 MCG
2 SPRAY, SUSPENSION (ML) NASAL DAILY
Qty: 1 BOTTLE | Refills: 3 | Status: SHIPPED | OUTPATIENT
Start: 2018-04-04 | End: 2018-07-23

## 2018-05-25 DIAGNOSIS — M79.7 FIBROMYALGIA SYNDROME: ICD-10-CM

## 2018-05-25 DIAGNOSIS — F41.1 GENERALIZED ANXIETY DISORDER: ICD-10-CM

## 2018-05-25 RX ORDER — TRAMADOL HYDROCHLORIDE 50 MG/1
TABLET ORAL
Qty: 90 TABLET | Refills: 0 | Status: SHIPPED | OUTPATIENT
Start: 2018-05-25 | End: 2018-06-24

## 2018-06-11 DIAGNOSIS — F41.1 GENERALIZED ANXIETY DISORDER: ICD-10-CM

## 2018-06-11 RX ORDER — DULOXETIN HYDROCHLORIDE 60 MG/1
60 CAPSULE, DELAYED RELEASE ORAL DAILY
Qty: 30 CAPSULE | Refills: 0 | Status: SHIPPED | OUTPATIENT
Start: 2018-06-11 | End: 2018-07-10 | Stop reason: SDUPTHER

## 2018-07-10 ENCOUNTER — TELEPHONE (OUTPATIENT)
Dept: FAMILY MEDICINE CLINIC | Age: 28
End: 2018-07-10

## 2018-07-10 DIAGNOSIS — F41.1 GENERALIZED ANXIETY DISORDER: ICD-10-CM

## 2018-07-10 RX ORDER — DULOXETIN HYDROCHLORIDE 30 MG/1
30 CAPSULE, DELAYED RELEASE ORAL DAILY
Qty: 30 CAPSULE | Refills: 1 | Status: SHIPPED | OUTPATIENT
Start: 2018-07-10 | End: 2018-08-31 | Stop reason: SDUPTHER

## 2018-07-10 RX ORDER — CLONAZEPAM 1 MG/1
TABLET ORAL
Qty: 90 TABLET | Refills: 1 | Status: SHIPPED | OUTPATIENT
Start: 2018-07-10 | End: 2018-07-23 | Stop reason: ALTCHOICE

## 2018-07-10 RX ORDER — DULOXETIN HYDROCHLORIDE 60 MG/1
60 CAPSULE, DELAYED RELEASE ORAL DAILY
Qty: 30 CAPSULE | Refills: 0 | Status: SHIPPED | OUTPATIENT
Start: 2018-07-10 | End: 2018-08-05 | Stop reason: SDUPTHER

## 2018-07-10 NOTE — TELEPHONE ENCOUNTER
Saw patient's daughter for [address] [place] [address]. Kyra experiencing increased social anxiety disorder. Will increase Cymbalta 90 mg and continue clonazepam.  F/u in this office in 1 to 2 months. She understands. The risks, benefits, potential side effects and barriers to medication use were addressed today. Understanding was acknowledged. Patient asked to follow-up if condition(s) do not improve as anticipated. [place] was reviewed on 07/10/18 and activity was consistent.

## 2018-07-15 DIAGNOSIS — F41.1 GENERALIZED ANXIETY DISORDER: ICD-10-CM

## 2018-07-17 RX ORDER — DULOXETIN HYDROCHLORIDE 60 MG/1
60 CAPSULE, DELAYED RELEASE ORAL DAILY
Qty: 30 CAPSULE | Refills: 2 | Status: SHIPPED | OUTPATIENT
Start: 2018-07-17 | End: 2018-11-02

## 2018-07-20 ENCOUNTER — TELEPHONE (OUTPATIENT)
Dept: FAMILY MEDICINE CLINIC | Age: 28
End: 2018-07-20

## 2018-07-23 ENCOUNTER — OFFICE VISIT (OUTPATIENT)
Dept: FAMILY MEDICINE CLINIC | Age: 28
End: 2018-07-23

## 2018-07-23 VITALS
HEART RATE: 95 BPM | BODY MASS INDEX: 20.62 KG/M2 | WEIGHT: 139.6 LBS | DIASTOLIC BLOOD PRESSURE: 77 MMHG | SYSTOLIC BLOOD PRESSURE: 98 MMHG | OXYGEN SATURATION: 99 % | TEMPERATURE: 98.2 F | RESPIRATION RATE: 12 BRPM

## 2018-07-23 DIAGNOSIS — M79.18 MYOFASCIAL PAIN: ICD-10-CM

## 2018-07-23 DIAGNOSIS — F41.1 GENERALIZED ANXIETY DISORDER: ICD-10-CM

## 2018-07-23 PROCEDURE — 4004F PT TOBACCO SCREEN RCVD TLK: CPT | Performed by: FAMILY MEDICINE

## 2018-07-23 PROCEDURE — G8427 DOCREV CUR MEDS BY ELIG CLIN: HCPCS | Performed by: FAMILY MEDICINE

## 2018-07-23 PROCEDURE — 99214 OFFICE O/P EST MOD 30 MIN: CPT | Performed by: FAMILY MEDICINE

## 2018-07-23 PROCEDURE — G8420 CALC BMI NORM PARAMETERS: HCPCS | Performed by: FAMILY MEDICINE

## 2018-07-23 RX ORDER — TRAMADOL HYDROCHLORIDE 50 MG/1
50 TABLET ORAL EVERY 6 HOURS PRN
COMMUNITY
End: 2018-07-23 | Stop reason: SDUPTHER

## 2018-07-23 RX ORDER — CLONAZEPAM 1 MG/1
TABLET ORAL
Qty: 150 TABLET | Refills: 0 | Status: SHIPPED | OUTPATIENT
Start: 2018-07-29 | End: 2018-08-13 | Stop reason: SDUPTHER

## 2018-07-23 RX ORDER — TRAMADOL HYDROCHLORIDE 50 MG/1
TABLET ORAL
Qty: 120 TABLET | Refills: 0 | Status: SHIPPED | OUTPATIENT
Start: 2018-07-23 | End: 2018-08-13 | Stop reason: SDUPTHER

## 2018-07-23 NOTE — PROGRESS NOTES
skilled, trained counselor at a crisis center in your area, anytime 24/7.  3) If after hours or during the weekend, you could speak to one of the doctors at [address] Call [phone] and you will be given the prompts of how to reach the doctor. 4) You can always go to the nearest ER if you feel that you may be in danger to yourself. Consider adding one of the phone numbers to your cell phone and giving it a title such as \"help. \"              Please note a portion of this chart was generated using dragon dictation software. Although every effort was made to ensure the accuracy of this automated transcription, some errors in transcription may have occurred.

## 2018-07-23 NOTE — PATIENT INSTRUCTIONS
1) Meet with Thomas yeun. Call this clinic with update later this week how you are doing THursday or Friday. 2) Consider to see if [facility] has any other services helpful to you and your daughter. Questions about lactation could be directed to them or with a lactation specialist.  3) You can take additional clonazepam and Tramadol for now. 4) You've helped your daughter with the breast milk. If this means easing off the nursing and shifting to formula for now, that is it. When taking 3 to 4 Tramadol in a day, avoid breast feeding. It taking 1 to 2 Tramadol time the nursing within 8 to 12 hours after previous Tramadol. 5) Schedule f/u around 2 to 4 weeks. 6) You can schedule a weight check for Roxanne for same day. 7) It could be helpful to still see a talk therapist. This summer. If you ever find yourself contemplating suicide, please reach out to someone through the following resources:    1) In the local area, the crisis number for [facility] is 281-CARE. This crisis line is available 24 hours a day, seven days a week. 2) You could also call The National Suicide Prevention Hotline. By calling 3-990-056-SPZV (8670) youll be connected to a skilled, trained counselor at a crisis center in your area, anytime 24/7.  3) If after hours or during the weekend, you could speak to one of the doctors at [address] Call [phone] and you will be given the prompts of how to reach the doctor. 4) You can always go to the nearest ER if you feel that you may be in danger to yourself. Consider adding one of the phone numbers to your cell phone and giving it a title such as \"help. \"

## 2018-07-27 ENCOUNTER — TELEPHONE (OUTPATIENT)
Dept: FAMILY MEDICINE CLINIC | Age: 28
End: 2018-07-27

## 2018-07-27 NOTE — TELEPHONE ENCOUNTER
Pt called in to f/u regarding her post- partum depression. Pt states she was told to in a few days to let PCP know how she is doing. Pt states as far as the post- partum she is mentally feeling a lot better with taking the increase Klonipin. Pt states she still has a lot of back pain down her spine and bra area. She is taking 4 tramadol daily and the pain is the same as when she was seen on Monday.     Please advise

## 2018-08-03 ENCOUNTER — TELEPHONE (OUTPATIENT)
Dept: FAMILY MEDICINE CLINIC | Age: 28
End: 2018-08-03

## 2018-08-05 DIAGNOSIS — F41.1 GENERALIZED ANXIETY DISORDER: ICD-10-CM

## 2018-08-07 RX ORDER — DULOXETIN HYDROCHLORIDE 60 MG/1
CAPSULE, DELAYED RELEASE ORAL
Qty: 30 CAPSULE | Refills: 2 | Status: SHIPPED | OUTPATIENT
Start: 2018-08-07 | End: 2018-10-26 | Stop reason: SDUPTHER

## 2018-08-13 ENCOUNTER — OFFICE VISIT (OUTPATIENT)
Dept: FAMILY MEDICINE CLINIC | Age: 28
End: 2018-08-13

## 2018-08-13 VITALS
DIASTOLIC BLOOD PRESSURE: 81 MMHG | TEMPERATURE: 97 F | OXYGEN SATURATION: 96 % | SYSTOLIC BLOOD PRESSURE: 118 MMHG | WEIGHT: 139.6 LBS | BODY MASS INDEX: 20.62 KG/M2 | RESPIRATION RATE: 12 BRPM | HEART RATE: 49 BPM

## 2018-08-13 DIAGNOSIS — F41.1 GENERALIZED ANXIETY DISORDER: Primary | ICD-10-CM

## 2018-08-13 DIAGNOSIS — M79.18 MYOFASCIAL PAIN: ICD-10-CM

## 2018-08-13 DIAGNOSIS — H10.022 PINK EYE DISEASE OF LEFT EYE: ICD-10-CM

## 2018-08-13 PROCEDURE — 99214 OFFICE O/P EST MOD 30 MIN: CPT | Performed by: FAMILY MEDICINE

## 2018-08-13 PROCEDURE — G8427 DOCREV CUR MEDS BY ELIG CLIN: HCPCS | Performed by: FAMILY MEDICINE

## 2018-08-13 PROCEDURE — G8420 CALC BMI NORM PARAMETERS: HCPCS | Performed by: FAMILY MEDICINE

## 2018-08-13 PROCEDURE — 4004F PT TOBACCO SCREEN RCVD TLK: CPT | Performed by: FAMILY MEDICINE

## 2018-08-13 RX ORDER — TRAMADOL HYDROCHLORIDE 50 MG/1
TABLET ORAL
Qty: 180 TABLET | Refills: 1 | Status: SHIPPED | OUTPATIENT
Start: 2018-08-13 | End: 2018-09-12

## 2018-08-13 RX ORDER — POLYMYXIN B SULFATE AND TRIMETHOPRIM 1; 10000 MG/ML; [USP'U]/ML
1 SOLUTION OPHTHALMIC EVERY 6 HOURS
Qty: 1 BOTTLE | Refills: 0 | Status: SHIPPED | OUTPATIENT
Start: 2018-08-13 | End: 2018-08-20

## 2018-08-13 RX ORDER — CLONAZEPAM 1 MG/1
TABLET ORAL
Qty: 180 TABLET | Refills: 1 | Status: SHIPPED | OUTPATIENT
Start: 2018-08-13 | End: 2018-10-11 | Stop reason: SDUPTHER

## 2018-08-13 NOTE — PROGRESS NOTES
Meloxicam Other (See Comments)     Prior to Visit Medications    Medication Sig Taking? Authorizing Provider   clonazePAM (KLONOPIN) 1 MG tablet Take 1 to 2 tabs po q6h prn anxiety. Do not exceed 6 tabs/24h. Kedar Vargas DO   traMADol (ULTRAM) 50 MG tablet Take 1-2 tablets po q8h prn pain. Do not exceed 6 tablets. Kedar Vargas DO   trimethoprim-polymyxin b (POLYTRIM) 68728-3.1 UNIT/ML-% ophthalmic solution Place 1 drop into the left eye every 6 hours for 7 days Yes Edison Vargas DO   DULoxetine (CYMBALTA) 60 MG extended release capsule TAKE ONE CAPSULE BY MOUTH EVERY DAY(ALONG WITH A 30MG. CAPSULE) Yes Edison Vargas DO   DULoxetine (CYMBALTA) 60 MG extended release capsule TAKE 1 CAPSULE BY MOUTH DAILY Yes Edison Vargas DO   DULoxetine (CYMBALTA) 30 MG extended release capsule Take 1 capsule by mouth daily Yes Edison Vargas DO   ibuprofen (ADVIL;MOTRIN) 800 MG tablet Take 1 tablet by mouth every 8 hours as needed for Pain Yes Edison Vargas DO   Cholecalciferol (VITAMIN D3) 1000 units TABS Take 1,000 Units by mouth Yes Historical Provider, MD   Prenatal Multivit-Min-Fe-FA (PRENATAL 1 + IRON PO) Take by mouth Yes Historical Provider, MD   Hydroxychloroquine Sulfate (PLAQUENIL PO) Take by mouth 1.5 tabs daily Yes Historical Provider, MD     Attestation: The Prescription Monitoring Report for this patient was reviewed today.  Edison Vargas DO)    Vitals:    08/13/18 1206   BP: 118/81   Pulse: (!) 49   Resp: 12   Temp: 97 °F (36.1 °C)   SpO2: 96%   Weight: 139 lb 9.6 oz (63.3 kg)      Wt Readings from Last 3 Encounters:   08/13/18 139 lb 9.6 oz (63.3 kg)   07/23/18 139 lb 9.6 oz (63.3 kg)   04/04/18 152 lb 3.2 oz (69 kg)     BP Readings from Last 3 Encounters:   08/13/18 118/81   07/23/18 98/77   04/04/18 107/77       Patient Active Problem List   Diagnosis    Mood disorder (HCC)    Acne    Myofascial pain    Neck swelling medication followup appointments, and that medication refills for benzodiazepines, narcotics and/or stimulants will only be given at appointment. The risks, benefits, potential side effects and barriers to medication use were addressed today. Understanding was acknowledged. Patient asked to follow-up if condition(s) do not improve as anticipated. PLAN          See rest of plan under patient instructions. Return in about 2 months (around 10/13/2018). There are no Patient Instructions on file for this visit. Please note a portion of this chart was generated using dragon dictation software. Although every effort was made to ensure the accuracy of this automated transcription, some errors in transcription may have occurred.

## 2018-08-17 ENCOUNTER — TELEPHONE (OUTPATIENT)
Dept: FAMILY MEDICINE CLINIC | Age: 28
End: 2018-08-17

## 2018-08-17 DIAGNOSIS — M79.18 MYOFASCIAL PAIN: Primary | ICD-10-CM

## 2018-08-24 DIAGNOSIS — M79.18 MYOFASCIAL PAIN: ICD-10-CM

## 2018-08-27 RX ORDER — IBUPROFEN 800 MG/1
800 TABLET ORAL EVERY 8 HOURS PRN
Qty: 60 TABLET | Refills: 0 | OUTPATIENT
Start: 2018-08-27

## 2018-08-27 RX ORDER — IBUPROFEN 800 MG/1
800 TABLET ORAL EVERY 8 HOURS PRN
Qty: 60 TABLET | Refills: 5 | Status: SHIPPED | OUTPATIENT
Start: 2018-08-27 | End: 2018-11-16 | Stop reason: SDUPTHER

## 2018-08-31 DIAGNOSIS — F41.1 GENERALIZED ANXIETY DISORDER: ICD-10-CM

## 2018-09-05 RX ORDER — DULOXETIN HYDROCHLORIDE 30 MG/1
CAPSULE, DELAYED RELEASE ORAL
Qty: 30 CAPSULE | Refills: 0 | Status: SHIPPED | OUTPATIENT
Start: 2018-09-05 | End: 2018-09-10 | Stop reason: SDUPTHER

## 2018-09-07 ENCOUNTER — TELEPHONE (OUTPATIENT)
Dept: FAMILY MEDICINE CLINIC | Age: 28
End: 2018-09-07

## 2018-09-07 NOTE — TELEPHONE ENCOUNTER
Just an FYI. [facility] Pt mother, April, called stating her daughter has an appointment w/ pcp on Monday 9/10/18 and she was wanted Dr. Arminda Matias to be aware that maybe he can look into recommending Kyra to seeing a therapist, like Dr. Daniela Sharp. She states that Sherice Mayorga is doing better and taking care of the baby fine but she still has these days where she is really depressed and angry and do not know if it is still part of the post partum or medication or even both. If Dr. Arminda Matias can maybe talk with Sherice Mayorga and see what she feels.

## 2018-09-10 ENCOUNTER — OFFICE VISIT (OUTPATIENT)
Dept: FAMILY MEDICINE CLINIC | Age: 28
End: 2018-09-10

## 2018-09-10 VITALS
TEMPERATURE: 98.7 F | OXYGEN SATURATION: 96 % | BODY MASS INDEX: 19.37 KG/M2 | RESPIRATION RATE: 12 BRPM | WEIGHT: 131.2 LBS | HEART RATE: 95 BPM | DIASTOLIC BLOOD PRESSURE: 64 MMHG | SYSTOLIC BLOOD PRESSURE: 105 MMHG

## 2018-09-10 DIAGNOSIS — F41.1 GENERALIZED ANXIETY DISORDER: ICD-10-CM

## 2018-09-10 DIAGNOSIS — M79.18 MYOFASCIAL PAIN: Primary | ICD-10-CM

## 2018-09-10 PROCEDURE — G8427 DOCREV CUR MEDS BY ELIG CLIN: HCPCS | Performed by: FAMILY MEDICINE

## 2018-09-10 PROCEDURE — 99213 OFFICE O/P EST LOW 20 MIN: CPT | Performed by: FAMILY MEDICINE

## 2018-09-10 PROCEDURE — 4004F PT TOBACCO SCREEN RCVD TLK: CPT | Performed by: FAMILY MEDICINE

## 2018-09-10 PROCEDURE — G8420 CALC BMI NORM PARAMETERS: HCPCS | Performed by: FAMILY MEDICINE

## 2018-09-10 RX ORDER — HYDROCODONE BITARTRATE AND ACETAMINOPHEN 5; 325 MG/1; MG/1
1 TABLET ORAL 2 TIMES DAILY PRN
Qty: 60 TABLET | Refills: 0 | Status: SHIPPED | OUTPATIENT
Start: 2018-09-10 | End: 2018-09-13 | Stop reason: SDUPTHER

## 2018-09-10 RX ORDER — DULOXETIN HYDROCHLORIDE 30 MG/1
CAPSULE, DELAYED RELEASE ORAL
Qty: 30 CAPSULE | Refills: 0 | Status: SHIPPED | OUTPATIENT
Start: 2018-09-10 | End: 2018-10-11

## 2018-09-10 NOTE — PROGRESS NOTES
[facility]  Progress Note  Amber Agarwal [place]          Tammi Pollock  1990    09/10/18    Chief Complaint:   Tammi Pollock is a 29 y.o. female who is here for depression. HPI:   Falls asleep easily. Taking pain medication. Tramadol gets her to sleep. Suboxone was difficult to get off of. Having to move. Takes cymbalta 30 mg and 60 mg of clonazpeam.  Tramadol helps with pain. Feels like is in withdrawal if does not have that medication. ROS negative for headache, vision changes, chest pain, shortness of breath, abdominal pain, urinary sx, bowel changes. Past medical, surgical, and social history reviewed. Medications and allergies reviewed. Allergies   Allergen Reactions    Meloxicam Other (See Comments)     Prior to Visit Medications    Medication Sig Taking? Authorizing Provider   HYDROcodone-acetaminophen (NORCO) 5-325 MG per tablet Take 1 tablet by mouth 2 times daily as needed for Pain for up to 60 doses. Take 1-2 tabs po q6h prn pain. Yes Irma Vargas, DO   DULoxetine (CYMBALTA) 30 MG extended release capsule TAKE 1 CAPSULE BY MOUTH EVERY DAY with the 60 mg duloxetine dose. Yes Irma Vargas DO   ibuprofen (ADVIL;MOTRIN) 800 MG tablet TAKE 1 TABLET BY MOUTH EVERY 8 HOURS AS NEEDED FOR PAIN Yes Irma Vargas DO   clonazePAM (KLONOPIN) 1 MG tablet Take 1 to 2 tabs po q6h prn anxiety. Do not exceed 6 tabs/24h. Brian Vargas, DO   traMADol (ULTRAM) 50 MG tablet Take 1-2 tablets po q8h prn pain. Do not exceed 6 tablets. Brian Vargas, DO   DULoxetine (CYMBALTA) 60 MG extended release capsule TAKE ONE CAPSULE BY MOUTH EVERY DAY(ALONG WITH A 30MG.  CAPSULE) Yes Irma Vragas DO   DULoxetine (CYMBALTA) 60 MG extended release capsule TAKE 1 CAPSULE BY MOUTH DAILY Yes Irma Vargas DO   Cholecalciferol (VITAMIN D3) 1000 units TABS Take 1,000 Units by mouth Yes Historical Provider, MD Hydroxychloroquine Sulfate (PLAQUENIL PO) Take by mouth 1.5 tabs daily Yes Historical Provider, MD   Prenatal Multivit-Min-Fe-FA (PRENATAL 1 + IRON PO) Take by mouth  Historical Provider, MD          Vitals:    09/10/18 1100   BP: 105/64   Pulse: 95   Resp: 12   Temp: 98.7 °F (37.1 °C)   SpO2: 96%   Weight: 131 lb 3.2 oz (59.5 kg)      Wt Readings from Last 3 Encounters:   09/10/18 131 lb 3.2 oz (59.5 kg)   08/13/18 139 lb 9.6 oz (63.3 kg)   07/23/18 139 lb 9.6 oz (63.3 kg)     BP Readings from Last 3 Encounters:   09/10/18 105/64   08/13/18 118/81   07/23/18 98/77       Patient Active Problem List   Diagnosis    Mood disorder ([facility] [address]    Acne    Myofascial pain    Neck swelling    History of sinus tachycardia    Tobacco use    Generalized anxiety disorder    Cigarette nicotine dependence with nicotine-induced disorder       Immunization History   Administered Date(s) Administered    Influenza, Quadv, 3 Years and older, IM (Fluzone 3 yrs and older or Afluria 5 yrs and older) 12/05/2017       Past Medical History:   Diagnosis Date    Seasonal allergies      Past Surgical History:   Procedure Laterality Date    TONSILLECTOMY  2007     Family History   Problem Relation Age of Onset    Other Mother         mixed tissue disorder     Social History     Social History    Marital status: Single     Spouse name: N/A    Number of children: N/A    Years of education: N/A     Occupational History    Not on file. Social History Main Topics    Smoking status: Current Every Day Smoker     Packs/day: 0.15    Smokeless tobacco: Never Used    Alcohol use No    Drug use: No    Sexual activity: Not on file     Other Topics Concern    Not on file     Social History Narrative    No narrative on file       O: /64   Pulse 95   Temp 98.7 °F (37.1 °C)   Resp 12   Wt 131 lb 3.2 oz (59.5 kg)   SpO2 96%   Breastfeeding?  No   BMI 19.37 kg/m²   Physical Exam  GEN: No acute distress, cooperative, well

## 2018-09-10 NOTE — PATIENT INSTRUCTIONS
1) Take the Tramadol sparingly since it makes you drowsy. Take Norco up to 2 tablets every day. 2) Call to make appt with Dr. Star Abad.   3) Call [phone] to follow-up with Dr. Roma Anthony.

## 2018-09-11 ENCOUNTER — TELEPHONE (OUTPATIENT)
Dept: FAMILY MEDICINE CLINIC | Age: 28
End: 2018-09-11

## 2018-09-13 ENCOUNTER — OFFICE VISIT (OUTPATIENT)
Dept: FAMILY MEDICINE CLINIC | Age: 28
End: 2018-09-13

## 2018-09-13 VITALS
OXYGEN SATURATION: 94 % | HEART RATE: 75 BPM | SYSTOLIC BLOOD PRESSURE: 104 MMHG | WEIGHT: 129 LBS | DIASTOLIC BLOOD PRESSURE: 71 MMHG | HEIGHT: 69 IN | BODY MASS INDEX: 19.11 KG/M2 | TEMPERATURE: 98.2 F | RESPIRATION RATE: 12 BRPM

## 2018-09-13 DIAGNOSIS — M79.18 MYOFASCIAL PAIN: ICD-10-CM

## 2018-09-13 PROCEDURE — 4004F PT TOBACCO SCREEN RCVD TLK: CPT | Performed by: FAMILY MEDICINE

## 2018-09-13 PROCEDURE — G8427 DOCREV CUR MEDS BY ELIG CLIN: HCPCS | Performed by: FAMILY MEDICINE

## 2018-09-13 PROCEDURE — 99214 OFFICE O/P EST MOD 30 MIN: CPT | Performed by: FAMILY MEDICINE

## 2018-09-13 PROCEDURE — G8420 CALC BMI NORM PARAMETERS: HCPCS | Performed by: FAMILY MEDICINE

## 2018-09-13 RX ORDER — HYDROCODONE BITARTRATE AND ACETAMINOPHEN 5; 325 MG/1; MG/1
1 TABLET ORAL EVERY 6 HOURS PRN
Qty: 120 TABLET | Refills: 0 | Status: SHIPPED | OUTPATIENT
Start: 2018-09-25 | End: 2018-10-11 | Stop reason: SDUPTHER

## 2018-09-13 NOTE — PROGRESS NOTES
[facility]  Progress Note  Braden Mata DO          Rajan Wheat  1990 09/14/18    Chief Complaint:   Rajan Wheat is a 29 y.o. female who is here for fibromyalgia flare    HPI:   Known moderate to severe fibromyalgia. Accompanied by her mother. Not functioning as well taking care of her 4 month infant due to increased whole body pain. Tramadol makes her drowsy. Did fine with [facility] in the past. There is an addiction history admitted by patient and known by her mother. Has good support system of 2 grandparents in local area and from her  [place]. Clonazepam is of help. ROS negative for headache, vision changes, chest pain, shortness of breath, abdominal pain, urinary sx, bowel changes. Past medical, surgical, and social history reviewed. Medications and allergies reviewed. Allergies   Allergen Reactions    Meloxicam Other (See Comments)     Prior to Visit Medications    Medication Sig Taking? Authorizing Provider   HYDROcodone-acetaminophen (NORCO) 5-325 MG per tablet Take 1 tablet by mouth every 6 hours as needed for Pain for up to 120 doses. Michael Galindo Date: 9/25/18 Yes Neida Vargas, DO   DULoxetine (CYMBALTA) 30 MG extended release capsule TAKE 1 CAPSULE BY MOUTH EVERY DAY with the 60 mg duloxetine dose. Yes Neida Vargas DO   ibuprofen (ADVIL;MOTRIN) 800 MG tablet TAKE 1 TABLET BY MOUTH EVERY 8 HOURS AS NEEDED FOR PAIN Yes Neida Vargas DO   clonazePAM (KLONOPIN) 1 MG tablet Take 1 to 2 tabs po q6h prn anxiety. Do not exceed 6 tabs/24h. Estephania Vargas,    DULoxetine (CYMBALTA) 60 MG extended release capsule TAKE ONE CAPSULE BY MOUTH EVERY DAY(ALONG WITH A 30MG.  CAPSULE) Yes Neida Vargas DO   DULoxetine (CYMBALTA) 60 MG extended release capsule TAKE 1 CAPSULE BY MOUTH DAILY Yes Neida Vargas DO   Cholecalciferol (VITAMIN D3) 1000 units TABS Take 1,000 Units by mouth Yes Historical  Drug use: No    Sexual activity: Not on file     Other Topics Concern    Not on file     Social History Narrative    No narrative on file       O: /71   Pulse 75   Temp 98.2 °F (36.8 °C) (Oral)   Resp 12   Ht 5' 9\" (1.753 m)   Wt 129 lb (58.5 kg)   SpO2 94%   Breastfeeding? No   BMI 19.05 kg/m²   Physical Exam  GEN: No acute distress, cooperative, well nourished, alert. HEENT: PEERLA, EOMI , normocephalic/atraumatic, nares and oropharynx clear. Mucus membranes normal, Tympanic membranes clear bilaterally. Neck: soft, supple, no thyromegaly, mass, no Lymphadenopathy  CV: Regular rate and rhythm, no murmur, rubs, gallops. No edema. Resp: Clear to auscultation bilaterally good air entry bilaterally  No crackles, wheeze. Breathing comfortably. Psych: normal affect. Neuro: AOx3      ASSESSMENT   Diagnosis Orders   1. Myofascial pain  HYDROcodone-acetaminophen (NORCO) 5-325 MG per tablet   Stop Tramadol--> excessive drowsiness leading to ineffective parenting and not effective for #1.  10 minute discussion on the concern of short term and long term Norco use. Patient/parent understands. Pt aware of need for every 3 month medication followup appointments, and that medication refills for benzodiazepines, narcotics and/or stimulants will only be given at appointment. Will need med agreement at October f/u. PLAN          See rest of plan under patient instructions. No Follow-up on file. There are no Patient Instructions on file for this visit. Please note a portion of this chart was generated using dragon dictation software. Although every effort was made to ensure the accuracy of this automated transcription, some errors in transcription may have occurred.

## 2018-09-20 ENCOUNTER — TELEPHONE (OUTPATIENT)
Dept: FAMILY MEDICINE CLINIC | Age: 28
End: 2018-09-20

## 2018-09-20 NOTE — TELEPHONE ENCOUNTER
Dr. Thi Gomez called back. I spoke to him. Concerned about dependence (eventually heroin)  Suboxone. Needs psychology and exercise. Asked to f/u with me closely, he agrees. States she is not an opiate candidate.

## 2018-09-22 DIAGNOSIS — F41.1 GENERALIZED ANXIETY DISORDER: ICD-10-CM

## 2018-09-22 DIAGNOSIS — F32.89 OTHER DEPRESSION: ICD-10-CM

## 2018-09-22 DIAGNOSIS — M79.18 MYOFASCIAL PAIN: ICD-10-CM

## 2018-09-22 DIAGNOSIS — F39 MOOD DISORDER (HCC): ICD-10-CM

## 2018-09-24 ENCOUNTER — TELEPHONE (OUTPATIENT)
Dept: FAMILY MEDICINE CLINIC | Age: 28
End: 2018-09-24

## 2018-09-24 RX ORDER — ARIPIPRAZOLE 5 MG/1
5 TABLET ORAL DAILY
Qty: 30 TABLET | Refills: 5 | Status: SHIPPED | OUTPATIENT
Start: 2018-09-24 | End: 2018-11-02 | Stop reason: ALTCHOICE

## 2018-09-24 RX ORDER — DULOXETIN HYDROCHLORIDE 30 MG/1
CAPSULE, DELAYED RELEASE ORAL
Qty: 3 CAPSULE | Refills: 5 | Status: SHIPPED | OUTPATIENT
Start: 2018-09-24 | End: 2018-11-02

## 2018-09-24 RX ORDER — IBUPROFEN 800 MG/1
800 TABLET ORAL EVERY 8 HOURS PRN
Qty: 60 TABLET | Refills: 5 | OUTPATIENT
Start: 2018-09-24

## 2018-09-24 RX ORDER — IBUPROFEN 800 MG/1
TABLET ORAL
Qty: 60 TABLET | Refills: 5 | Status: SHIPPED | OUTPATIENT
Start: 2018-09-24 | End: 2018-11-02

## 2018-09-24 RX ORDER — DULOXETIN HYDROCHLORIDE 60 MG/1
60 CAPSULE, DELAYED RELEASE ORAL DAILY
Qty: 30 CAPSULE | Refills: 5 | Status: SHIPPED | OUTPATIENT
Start: 2018-09-24 | End: 2018-11-02

## 2018-09-24 NOTE — TELEPHONE ENCOUNTER
Medication was Norco which was upted from twice a day to one every 6 hours, so need PA for new directions

## 2018-09-24 NOTE — TELEPHONE ENCOUNTER
Per phone encounter 9/20/18 Dr. Zoey Wiley documented about the patient's history of heroin use and that Jake Limon is not an opiate candidate. \" Also, while looking for additional information about Suboxone use history in Care Everywhere for PA questions, documented on 9/10/18 OV note, I found that the patient tested positive for [facility] on 5/9/18 at [facility] and was seen at [facility] ED for opiate withdrawal multiple times for \"snorting opium\" and Suboxone withdrawal, uncertain if Dr. Zoey Wiley is aware of this. Please confirm if Dr. Zoey Wiley wants Norco 5-325 mg qid PA submitted. Please advise.

## 2018-10-01 ENCOUNTER — OFFICE VISIT (OUTPATIENT)
Dept: PSYCHOLOGY | Age: 28
End: 2018-10-01
Payer: MEDICAID

## 2018-10-01 DIAGNOSIS — F41.1 GENERALIZED ANXIETY DISORDER: Primary | ICD-10-CM

## 2018-10-01 DIAGNOSIS — F39 MOOD DISORDER (HCC): ICD-10-CM

## 2018-10-01 DIAGNOSIS — G89.4 CHRONIC PAIN SYNDROME: ICD-10-CM

## 2018-10-01 PROCEDURE — 90834 PSYTX W PT 45 MINUTES: CPT | Performed by: PSYCHOLOGIST

## 2018-10-01 ASSESSMENT — ANXIETY QUESTIONNAIRES
4. TROUBLE RELAXING: 3-NEARLY EVERY DAY
GAD7 TOTAL SCORE: 21
3. WORRYING TOO MUCH ABOUT DIFFERENT THINGS: 3-NEARLY EVERY DAY
2. NOT BEING ABLE TO STOP OR CONTROL WORRYING: 3-NEARLY EVERY DAY
6. BECOMING EASILY ANNOYED OR IRRITABLE: 3-NEARLY EVERY DAY
7. FEELING AFRAID AS IF SOMETHING AWFUL MIGHT HAPPEN: 3-NEARLY EVERY DAY
5. BEING SO RESTLESS THAT IT IS HARD TO SIT STILL: 3-NEARLY EVERY DAY
1. FEELING NERVOUS, ANXIOUS, OR ON EDGE: 3-NEARLY EVERY DAY

## 2018-10-01 ASSESSMENT — PATIENT HEALTH QUESTIONNAIRE - PHQ9
7. TROUBLE CONCENTRATING ON THINGS, SUCH AS READING THE NEWSPAPER OR WATCHING TELEVISION: 3
3. TROUBLE FALLING OR STAYING ASLEEP: 3
SUM OF ALL RESPONSES TO PHQ QUESTIONS 1-9: 22
2. FEELING DOWN, DEPRESSED OR HOPELESS: 3
4. FEELING TIRED OR HAVING LITTLE ENERGY: 1
SUM OF ALL RESPONSES TO PHQ9 QUESTIONS 1 & 2: 3
6. FEELING BAD ABOUT YOURSELF - OR THAT YOU ARE A FAILURE OR HAVE LET YOURSELF OR YOUR FAMILY DOWN: 3
9. THOUGHTS THAT YOU WOULD BE BETTER OFF DEAD, OR OF HURTING YOURSELF: 3
SUM OF ALL RESPONSES TO PHQ QUESTIONS 1-9: 22
5. POOR APPETITE OR OVEREATING: 3
1. LITTLE INTEREST OR PLEASURE IN DOING THINGS: 0
8. MOVING OR SPEAKING SO SLOWLY THAT OTHER PEOPLE COULD HAVE NOTICED. OR THE OPPOSITE, BEING SO FIGETY OR RESTLESS THAT YOU HAVE BEEN MOVING AROUND A LOT MORE THAN USUAL: 3

## 2018-10-01 NOTE — PROGRESS NOTES
Behavioral Health Consultation  Julissa Cannon Psy.D. Psychologist  10/1/2018  10:38 AM      Time spent with Patient: 40 minutes  This is patient's seventh [facility] appointment. Reason for Consult:  depression, anxiety, stress, chronic pain and opioid withdrawal  Referring Provider: Carolann Bence, DO  [address] [address] [address] [employer] [place], [address] [facility]      S:  Pt reported that her pregnancy was very hard d/t fibromyalgia pain. Had to quit working at 5 months because she kept blacking out, going to the hospital. [place] better after she quit. Daughter wasn't breathing at first, spent time in the NICU but okay since then. Has been hard for pt to adjust to not making her own money. Pt was making poor choices for awhile but has stopped this recently. Moved this past weekend with her  and baby. Dealt with significant interpersonal issues during this time, which really upset her. Stopped breastfeeding at 2 months so she could get back on her medication. Postpartum symptoms started then, with meanness, mood swings, no appetite. Was initially on tramadol and clonazepam, but was too tired, falling asleep all the time. Now on Norco and clonazepam. Tierra Barclayurray has really helped her pain, but having trouble thinking straight, memory issues, etc. Feels very scary to her.        O:  MSE:  Appearance: good hygiene and appropriate attire  Attitude: cooperative, tearful and moderate distress  Consciousness: alert  Orientation: oriented to person, place, time, general circumstance  Memory: recent and remote memory intact  Attention/Concentration: intact during session    Psychomotor Activity: normal  Eye Contact: normal  Speech: normal rate and volume, well-articulated  Mood: dyshporic and anxious   Affect: congruent with thought content and mood   Perception: within normal limits  Thought Content: within normal limits   Thought Process: logical, goal-directed, coherent  Insight: improving  Judgment: intact  Morbid

## 2018-10-11 ENCOUNTER — OFFICE VISIT (OUTPATIENT)
Dept: FAMILY MEDICINE CLINIC | Age: 28
End: 2018-10-11
Payer: MEDICAID

## 2018-10-11 VITALS — RESPIRATION RATE: 16 BRPM | DIASTOLIC BLOOD PRESSURE: 84 MMHG | HEART RATE: 113 BPM | SYSTOLIC BLOOD PRESSURE: 126 MMHG

## 2018-10-11 DIAGNOSIS — M79.18 MYOFASCIAL PAIN: Primary | ICD-10-CM

## 2018-10-11 DIAGNOSIS — Z13.31 POSITIVE DEPRESSION SCREENING: ICD-10-CM

## 2018-10-11 DIAGNOSIS — F41.1 GENERALIZED ANXIETY DISORDER: ICD-10-CM

## 2018-10-11 PROCEDURE — 99213 OFFICE O/P EST LOW 20 MIN: CPT | Performed by: FAMILY MEDICINE

## 2018-10-11 PROCEDURE — 4004F PT TOBACCO SCREEN RCVD TLK: CPT | Performed by: FAMILY MEDICINE

## 2018-10-11 PROCEDURE — G8484 FLU IMMUNIZE NO ADMIN: HCPCS | Performed by: FAMILY MEDICINE

## 2018-10-11 PROCEDURE — G8427 DOCREV CUR MEDS BY ELIG CLIN: HCPCS | Performed by: FAMILY MEDICINE

## 2018-10-11 PROCEDURE — G8420 CALC BMI NORM PARAMETERS: HCPCS | Performed by: FAMILY MEDICINE

## 2018-10-11 PROCEDURE — G8431 POS CLIN DEPRES SCRN F/U DOC: HCPCS | Performed by: FAMILY MEDICINE

## 2018-10-11 RX ORDER — CLONAZEPAM 1 MG/1
TABLET ORAL
Qty: 180 TABLET | Refills: 0 | Status: SHIPPED | OUTPATIENT
Start: 2018-10-25 | End: 2018-11-12 | Stop reason: SDUPTHER

## 2018-10-11 RX ORDER — HYDROCODONE BITARTRATE AND ACETAMINOPHEN 5; 325 MG/1; MG/1
1 TABLET ORAL EVERY 6 HOURS PRN
Qty: 120 TABLET | Refills: 0 | Status: SHIPPED | OUTPATIENT
Start: 2018-10-24 | End: 2018-11-12 | Stop reason: SDUPTHER

## 2018-10-11 RX ORDER — CLONAZEPAM 1 MG/1
TABLET ORAL
Qty: 180 TABLET | Refills: 0 | Status: SHIPPED | OUTPATIENT
Start: 2018-10-25 | End: 2018-10-11 | Stop reason: SDUPTHER

## 2018-10-11 NOTE — PROGRESS NOTES
medications. ROS negative for headache, vision changes, chest pain, shortness of breath, abdominal pain, urinary sx, bowel changes. Past medical, surgical, and social history reviewed. Medications and allergies reviewed. Allergies   Allergen Reactions    Meloxicam Other (See Comments)     Prior to Visit Medications    Medication Sig Taking? Authorizing Provider   HYDROcodone-acetaminophen (NORCO) 5-325 MG per tablet Take 1 tablet by mouth every 6 hours as needed for Pain for up to 120 doses. Chloé Vargas, DO   clonazePAM (KLONOPIN) 1 MG tablet Take 1 to 2 tabs po q6h prn anxiety. Do not exceed 6 tabs/24h. Chloé Vargas, DO   ARIPiprazole (ABILIFY) 5 MG tablet TAKE 1 TABLET BY MOUTH DAILY Yes Ana Vargas DO   ibuprofen (ADVIL;MOTRIN) 800 MG tablet TAKE 1 TABLET BY MOUTH EVERY 8 HOURS AS NEEDED FOR PAIN Yes Ana Vargas DO   DULoxetine (CYMBALTA) 60 MG extended release capsule TAKE 1 CAPSULE BY MOUTH DAILY Yes Ana Vargas DO   DULoxetine (CYMBALTA) 30 MG extended release capsule TO WEAN OFF CYMBALTA TAKE 1 CAPSULE BY MOUTH DAILY FOR 3 DAYS THEN SWITCH TO CITALOPRAM(CELEXA) Yes Mark Vargas DO   ibuprofen (ADVIL;MOTRIN) 800 MG tablet TAKE 1 TABLET BY MOUTH EVERY 8 HOURS AS NEEDED FOR PAIN Yes Mark Vargas DO   DULoxetine (CYMBALTA) 60 MG extended release capsule TAKE ONE CAPSULE BY MOUTH EVERY DAY(ALONG WITH A 30MG.  CAPSULE) Yes Ana Vargas DO   DULoxetine (CYMBALTA) 60 MG extended release capsule TAKE 1 CAPSULE BY MOUTH DAILY Yes Ana Vargas DO   Cholecalciferol (VITAMIN D3) 1000 units TABS Take 1,000 Units by mouth Yes Historical Provider, MD          Vitals:    10/11/18 1140   BP: 126/84   Pulse: 113   Resp: 16      Wt Readings from Last 3 Encounters:   09/13/18 129 lb (58.5 kg)   09/10/18 131 lb 3.2 oz (59.5 kg)   08/13/18 139 lb 9.6 oz (63.3 kg)     BP Readings from Last 3 Encounters:

## 2018-10-22 ENCOUNTER — TELEPHONE (OUTPATIENT)
Dept: FAMILY MEDICINE CLINIC | Age: 28
End: 2018-10-22

## 2018-10-24 ENCOUNTER — CLINICAL DOCUMENTATION (OUTPATIENT)
Dept: PSYCHOLOGY | Age: 28
End: 2018-10-24

## 2018-10-26 DIAGNOSIS — F41.1 GENERALIZED ANXIETY DISORDER: ICD-10-CM

## 2018-10-26 DIAGNOSIS — M79.18 MYOFASCIAL PAIN: ICD-10-CM

## 2018-10-26 RX ORDER — DULOXETIN HYDROCHLORIDE 60 MG/1
CAPSULE, DELAYED RELEASE ORAL
Qty: 30 CAPSULE | Refills: 5 | Status: SHIPPED | OUTPATIENT
Start: 2018-10-26 | End: 2019-04-21 | Stop reason: SDUPTHER

## 2018-10-26 RX ORDER — DULOXETIN HYDROCHLORIDE 30 MG/1
CAPSULE, DELAYED RELEASE ORAL
Qty: 30 CAPSULE | Refills: 5 | Status: SHIPPED | OUTPATIENT
Start: 2018-10-26 | End: 2019-04-22 | Stop reason: SDUPTHER

## 2018-10-29 ENCOUNTER — CLINICAL DOCUMENTATION (OUTPATIENT)
Dept: PSYCHOLOGY | Age: 28
End: 2018-10-29

## 2018-11-02 ENCOUNTER — NURSE TRIAGE (OUTPATIENT)
Dept: OTHER | Facility: CLINIC | Age: 28
End: 2018-11-02

## 2018-11-02 ENCOUNTER — OFFICE VISIT (OUTPATIENT)
Dept: FAMILY MEDICINE CLINIC | Age: 28
End: 2018-11-02
Payer: MEDICAID

## 2018-11-02 ENCOUNTER — HOSPITAL ENCOUNTER (OUTPATIENT)
Dept: CT IMAGING | Age: 28
Discharge: HOME | End: 2018-11-02
Payer: MEDICAID

## 2018-11-02 ENCOUNTER — TELEPHONE (OUTPATIENT)
Dept: FAMILY MEDICINE CLINIC | Age: 28
End: 2018-11-02

## 2018-11-02 VITALS
SYSTOLIC BLOOD PRESSURE: 116 MMHG | HEART RATE: 105 BPM | DIASTOLIC BLOOD PRESSURE: 84 MMHG | WEIGHT: 107 LBS | OXYGEN SATURATION: 94 % | BODY MASS INDEX: 15.8 KG/M2

## 2018-11-02 DIAGNOSIS — F41.1 GENERALIZED ANXIETY DISORDER: ICD-10-CM

## 2018-11-02 DIAGNOSIS — R51.9 SUDDEN ONSET OF SEVERE HEADACHE: ICD-10-CM

## 2018-11-02 DIAGNOSIS — S09.90XA INJURY OF HEAD, INITIAL ENCOUNTER: ICD-10-CM

## 2018-11-02 DIAGNOSIS — R63.6 UNDERWEIGHT: ICD-10-CM

## 2018-11-02 DIAGNOSIS — G44.89 HEADACHE SYNDROME: ICD-10-CM

## 2018-11-02 DIAGNOSIS — F39 MOOD DISORDER (HCC): Primary | ICD-10-CM

## 2018-11-02 PROCEDURE — G8419 CALC BMI OUT NRM PARAM NOF/U: HCPCS | Performed by: FAMILY MEDICINE

## 2018-11-02 PROCEDURE — 99214 OFFICE O/P EST MOD 30 MIN: CPT | Performed by: FAMILY MEDICINE

## 2018-11-02 PROCEDURE — G8484 FLU IMMUNIZE NO ADMIN: HCPCS | Performed by: FAMILY MEDICINE

## 2018-11-02 PROCEDURE — 4004F PT TOBACCO SCREEN RCVD TLK: CPT | Performed by: FAMILY MEDICINE

## 2018-11-02 PROCEDURE — 70450 CT HEAD/BRAIN W/O DYE: CPT

## 2018-11-02 PROCEDURE — G8427 DOCREV CUR MEDS BY ELIG CLIN: HCPCS | Performed by: FAMILY MEDICINE

## 2018-11-02 RX ORDER — HYDROXYCHLOROQUINE SULFATE 200 MG/1
300 TABLET, FILM COATED ORAL DAILY
COMMUNITY
End: 2019-02-11

## 2018-11-02 RX ORDER — LORATADINE 10 MG/1
10 TABLET ORAL DAILY
COMMUNITY
End: 2019-02-11

## 2018-11-02 NOTE — PROGRESS NOTES
reviewed. Allergies   Allergen Reactions    Meloxicam Other (See Comments)     Pt states it just knocked her out      Prior to Visit Medications    Medication Sig Taking? Authorizing Provider   loratadine (CLARITIN) 10 MG tablet Take 10 mg by mouth daily Yes Historical Provider, MD   hydroxychloroquine (PLAQUENIL) 200 MG tablet Take 300 mg by mouth daily Yes Historical Provider, MD   HYDROcodone-acetaminophen (NORCO) 5-325 MG per tablet Take 1 tablet by mouth every 6 hours as needed for Pain for up to 120 doses. Ariane Vargas, DO   clonazePAM (KLONOPIN) 1 MG tablet Take 1 to 2 tabs po q6h prn anxiety. Do not exceed 6 tabs/24h. Ariane Vargas, DO   ibuprofen (ADVIL;MOTRIN) 800 MG tablet TAKE 1 TABLET BY MOUTH EVERY 8 HOURS AS NEEDED FOR PAIN Yes Vilma Vargas DO   Cholecalciferol (VITAMIN D3) 1000 units TABS Take 1,000 Units by mouth Yes Historical Provider, MD   DULoxetine (CYMBALTA) 60 MG extended release capsule TAKE ONE CAPSULE BY MOUTH EVERY DAY(ALONG WITH A 30MG.  CAPSULE)  Vilma Vargas DO   DULoxetine (CYMBALTA) 30 MG extended release capsule TAKE ONE CAPSULE BY MOUTH EVERY DAY WITH THE 60 MG DULOXETINE DOSE  Vilma Vargas DO          Vitals:    11/02/18 1417 11/02/18 1420   BP: (!) 127/91 116/84   Site: Right Upper Arm Right Upper Arm   Position: Sitting Supine   Cuff Size: Medium Adult Medium Adult   Pulse: 105    SpO2: 94%    Weight: 107 lb (48.5 kg)       Wt Readings from Last 3 Encounters:   11/02/18 107 lb (48.5 kg)   09/13/18 129 lb (58.5 kg)   09/10/18 131 lb 3.2 oz (59.5 kg)     BP Readings from Last 3 Encounters:   11/02/18 116/84   10/11/18 126/84   09/13/18 104/71       Patient Active Problem List   Diagnosis    Mood disorder (HCC)    Acne    Myofascial pain    Neck swelling    History of sinus tachycardia    Tobacco use    Generalized anxiety disorder    Cigarette nicotine dependence with nicotine-induced disorder Immunization History   Administered Date(s) Administered    Influenza, Quadv, 3 Years and older, IM (Fluzone 3 yrs and older or Afluria 5 yrs and older) 12/05/2017       Past Medical History:   Diagnosis Date    Seasonal allergies      Past Surgical History:   Procedure Laterality Date    TONSILLECTOMY  2007     Family History   Problem Relation Age of Onset    Other Mother         mixed tissue disorder     Social History     Social History    Marital status: Single     Spouse name: N/A    Number of children: N/A    Years of education: N/A     Occupational History    Not on file. Social History Main Topics    Smoking status: Current Every Day Smoker     Packs/day: 0.15    Smokeless tobacco: Never Used    Alcohol use No    Drug use: No    Sexual activity: Not on file     Other Topics Concern    Not on file     Social History Narrative    No narrative on file       O: /84 (Site: Right Upper Arm, Position: Supine, Cuff Size: Medium Adult)   Pulse 105   Wt 107 lb (48.5 kg)   SpO2 94%   BMI 15.80 kg/m²   Physical Exam   Psychiatric: Her speech is normal. Her mood appears anxious. Her affect is not angry. She is not hyperactive. She exhibits a depressed mood. She expresses no homicidal ideation. She is attentive. GEN: No mild distress,cooperative, malnourished, alert. Tearful at times. Prefers to lie on her side on the exam table. HEENT: PEERLA, EOMI , normocephalic/atraumatic, nares and oropharynx clear. Mucus membranes normal, Tympanic membranes clear bilaterally. Neck: soft, supple, no thyromegaly,mass, no Lymphadenopathy  CV: Regular rate and rhythm, no murmur, rubs, gallops. No edema. Resp: Clear to auscultation bilaterally good air entry bilaterally  No crackles, wheeze. Breathing comfortably. Neuro: AOx3      ASSESSMENT   Diagnosis Orders   1. Mood disorder [facility])  External Referral to Psychiatry   2. Underweight     3. Headache syndrome  CT Head WO Contrast   4.

## 2018-11-02 NOTE — TELEPHONE ENCOUNTER
Pt called the office crying stating she is in severe pain. She is having severe rheumatoid arthritis. On a scale of 1-10 she is at a 9. She would like to be seen with Dr. Maribell Deng but his schedule does not permits. The 415 slot is a 5 min slot. Would you like to add pt to schedule?  Please advise

## 2018-11-05 ENCOUNTER — TELEPHONE (OUTPATIENT)
Dept: FAMILY MEDICINE CLINIC | Age: 28
End: 2018-11-05

## 2018-11-06 ENCOUNTER — TELEPHONE (OUTPATIENT)
Dept: FAMILY MEDICINE CLINIC | Age: 28
End: 2018-11-06

## 2018-11-06 NOTE — TELEPHONE ENCOUNTER
Fax received from Department of children's services with [facility] [employer] [facility] [employer]. [facility]   with [address] request to obtain records from this practice. I will ask office to comply with this. Additionally the 's asking to speak to me. Please call Ms. Sonny Dickey during our office hours and get me when you have her on the line. [phone].

## 2018-11-09 ENCOUNTER — TELEPHONE (OUTPATIENT)
Dept: FAMILY MEDICINE CLINIC | Age: 28
End: 2018-11-09

## 2018-11-09 ENCOUNTER — OFFICE VISIT (OUTPATIENT)
Dept: FAMILY MEDICINE CLINIC | Age: 28
End: 2018-11-09
Payer: MEDICAID

## 2018-11-09 VITALS
OXYGEN SATURATION: 98 % | HEART RATE: 107 BPM | BODY MASS INDEX: 15.8 KG/M2 | DIASTOLIC BLOOD PRESSURE: 83 MMHG | SYSTOLIC BLOOD PRESSURE: 123 MMHG | TEMPERATURE: 97.8 F | RESPIRATION RATE: 10 BRPM | WEIGHT: 107 LBS

## 2018-11-09 DIAGNOSIS — Z71.3 WEIGHT LOSS COUNSELING, ENCOUNTER FOR: ICD-10-CM

## 2018-11-09 DIAGNOSIS — F41.1 GENERALIZED ANXIETY DISORDER: ICD-10-CM

## 2018-11-09 DIAGNOSIS — F39 MOOD DISORDER (HCC): Primary | ICD-10-CM

## 2018-11-09 DIAGNOSIS — M79.18 MYOFASCIAL PAIN: ICD-10-CM

## 2018-11-09 DIAGNOSIS — F11.90 OPIOID USE: Primary | ICD-10-CM

## 2018-11-09 PROCEDURE — 99214 OFFICE O/P EST MOD 30 MIN: CPT | Performed by: FAMILY MEDICINE

## 2018-11-09 PROCEDURE — G8427 DOCREV CUR MEDS BY ELIG CLIN: HCPCS | Performed by: FAMILY MEDICINE

## 2018-11-09 PROCEDURE — 4004F PT TOBACCO SCREEN RCVD TLK: CPT | Performed by: FAMILY MEDICINE

## 2018-11-09 PROCEDURE — G8484 FLU IMMUNIZE NO ADMIN: HCPCS | Performed by: FAMILY MEDICINE

## 2018-11-09 PROCEDURE — G8419 CALC BMI OUT NRM PARAM NOF/U: HCPCS | Performed by: FAMILY MEDICINE

## 2018-11-09 NOTE — PROGRESS NOTES
weight  HEENT: PEERLA, EOMI , normocephalic/atraumatic, nares and oropharynx clear. Mucus membranes normal, Tympanic membranes clear bilaterally. Neck: soft, supple, no thyromegaly,mass, no Lymphadenopathy  CV: Regular rate and rhythm, no murmur, rubs, gallops. No edema. Resp: Clear to auscultation bilaterally good air entry bilaterally  No crackles, wheeze. Breathing comfortably. Psych:normal affect. Neuro: AOx3      ASSESSMENT   Diagnosis Orders   1. Mood disorder ([address]     2. Weight loss counseling, encounter for     3. Generalized anxiety disorder       I had a ruby discussion with Opal Hernandez that the combination of Norco and her current medications are not helping her and affecting her mood. I provided information to see Lynda but she is hesitant at this time. She does not wish to get on Suboxone or Methadone. She was dispensed 120 tabs of Norco Oct 24 and will have enough until her next appointment. She needs to safely taper from Larena Nab.  She has additional legitimate stressors or financial difficulties and her partner Sebastian Molina it seems is dealing with his own depression. Her weight is the same as last week and at least not diminishing. I encouraged her to add protein shakes if possible. I informed her that if CPS is involved with her child that they could be reaching out to me. I encouraged that jess be present at the next visit (her mother was taking care of her daughter in the reception area during today's visit). My clinic will also check if Sahe Ryan is low tier on her insurance plan. Jean Paul Goddard PLAN          See rest of plan under patient instructions. Return in about 3 days (around 11/12/2018). Patient Instructions   1) Your PCP advises that you eventually come off the Norco. To safely do this, your PCP advises that you decrease to 3 Norco a day. 2) Your PCP advises that a replacement of Norco be Suboxone or Methadone. 3) your PCP is willing to prescribe clonazepam for now.   4) Follow-up

## 2018-11-09 NOTE — TELEPHONE ENCOUNTER
Spoke to [facility] and they stated that they need a rx sent to them before they can tell if insurance will cover it or not. Also they said that they have never heard or or seen this medication so they would definitely have to order it. If insurance does not cover it it would be $900 for 36 tablets. Please advise.

## 2018-11-12 ENCOUNTER — OFFICE VISIT (OUTPATIENT)
Dept: FAMILY MEDICINE CLINIC | Age: 28
End: 2018-11-12
Payer: MEDICAID

## 2018-11-12 ENCOUNTER — TELEPHONE (OUTPATIENT)
Dept: FAMILY MEDICINE CLINIC | Age: 28
End: 2018-11-12

## 2018-11-12 VITALS
TEMPERATURE: 97.1 F | HEART RATE: 94 BPM | RESPIRATION RATE: 14 BRPM | OXYGEN SATURATION: 98 % | SYSTOLIC BLOOD PRESSURE: 103 MMHG | DIASTOLIC BLOOD PRESSURE: 81 MMHG | HEIGHT: 69 IN | BODY MASS INDEX: 15.92 KG/M2 | WEIGHT: 107.5 LBS

## 2018-11-12 DIAGNOSIS — M79.18 MYOFASCIAL PAIN: ICD-10-CM

## 2018-11-12 DIAGNOSIS — M79.18 MYOFASCIAL PAIN: Primary | ICD-10-CM

## 2018-11-12 DIAGNOSIS — F41.1 GENERALIZED ANXIETY DISORDER: ICD-10-CM

## 2018-11-12 PROCEDURE — 4004F PT TOBACCO SCREEN RCVD TLK: CPT | Performed by: FAMILY MEDICINE

## 2018-11-12 PROCEDURE — G8419 CALC BMI OUT NRM PARAM NOF/U: HCPCS | Performed by: FAMILY MEDICINE

## 2018-11-12 PROCEDURE — G8484 FLU IMMUNIZE NO ADMIN: HCPCS | Performed by: FAMILY MEDICINE

## 2018-11-12 PROCEDURE — G8427 DOCREV CUR MEDS BY ELIG CLIN: HCPCS | Performed by: FAMILY MEDICINE

## 2018-11-12 PROCEDURE — 99214 OFFICE O/P EST MOD 30 MIN: CPT | Performed by: FAMILY MEDICINE

## 2018-11-12 RX ORDER — HYDROCODONE BITARTRATE AND ACETAMINOPHEN 5; 325 MG/1; MG/1
1 TABLET ORAL 3 TIMES DAILY PRN
Qty: 90 TABLET | Refills: 0 | Status: SHIPPED | OUTPATIENT
Start: 2018-11-23 | End: 2018-11-12 | Stop reason: SDUPTHER

## 2018-11-12 RX ORDER — HYDROCODONE BITARTRATE AND ACETAMINOPHEN 5; 325 MG/1; MG/1
1 TABLET ORAL 3 TIMES DAILY PRN
Qty: 90 TABLET | Refills: 0 | Status: SHIPPED | OUTPATIENT
Start: 2018-11-23 | End: 2018-12-23

## 2018-11-12 RX ORDER — CLONAZEPAM 1 MG/1
TABLET ORAL
Qty: 180 TABLET | Refills: 0 | Status: SHIPPED | OUTPATIENT
Start: 2018-11-24 | End: 2018-12-14 | Stop reason: SDUPTHER

## 2018-11-12 NOTE — PROGRESS NOTES
murmur, rubs, gallops. No edema. Resp: Clear to auscultation bilaterally good air entry bilaterally  No crackles, wheeze. Breathing comfortably. Psych:normal affect. Denies SI/HI. Neuro: AOx3      ASSESSMENT   Diagnosis Orders   1. Myofascial pain  HYDROcodone-acetaminophen (NORCO) 5-325 MG per tablet    DISCONTINUED: HYDROcodone-acetaminophen (NORCO) 5-325 MG per tablet   2. Generalized anxiety disorder  clonazePAM (KLONOPIN) 1 MG tablet    HYDROcodone-acetaminophen (NORCO) 5-325 MG per tablet       Given packet for EDS strategies. #1: The risks, benefits, potential side effects and barriers to medication use were addressed today. Understanding was acknowledged. Patient asked to follow-up if condition(s) do not improve as anticipated. Pt aware of need for 1 month medication followup appointments, and that medication refills for benzodiazepines, narcotics and/or stimulants will only be given at appointment. #2: The risks, benefits, potential side effects and barriers to medication use were addressed today. Understanding was acknowledged. Patient asked to follow-up if condition(s) do not improve as anticipated. PLAN          See rest of plan under patient instructions. Return in about 1 month (around 12/12/2018). Patient Instructions   1) Review the information about strategies of dealing with the pain. 2) Follow-up closely in the clinic. 3) Your PCP will see if the topical pain medication is free on the insurance plan ([employer] or [employer]). 4) Start the Pain cream and find a way to take only 3 Norco a day. Please note a portion of this chart was generated using dragon dictation software. Although every effort was made to ensure the accuracy of this automated transcription,some errors in transcription may have occurred.

## 2018-11-16 DIAGNOSIS — M79.18 MYOFASCIAL PAIN: ICD-10-CM

## 2018-11-16 RX ORDER — IBUPROFEN 800 MG/1
800 TABLET ORAL EVERY 8 HOURS PRN
Qty: 60 TABLET | Refills: 5 | OUTPATIENT
Start: 2018-11-16

## 2018-11-16 RX ORDER — IBUPROFEN 800 MG/1
800 TABLET ORAL EVERY 8 HOURS PRN
Qty: 60 TABLET | Refills: 5 | Status: SHIPPED | OUTPATIENT
Start: 2018-11-16 | End: 2019-02-11

## 2018-11-22 DIAGNOSIS — F41.1 GENERALIZED ANXIETY DISORDER: ICD-10-CM

## 2018-11-23 RX ORDER — CLONAZEPAM 1 MG/1
TABLET ORAL
Qty: 180 TABLET | Refills: 0 | OUTPATIENT
Start: 2018-11-23 | End: 2018-12-23

## 2018-12-03 ENCOUNTER — TELEPHONE (OUTPATIENT)
Dept: FAMILY MEDICINE CLINIC | Age: 28
End: 2018-12-03

## 2018-12-03 ENCOUNTER — OFFICE VISIT (OUTPATIENT)
Dept: FAMILY MEDICINE CLINIC | Age: 28
End: 2018-12-03
Payer: MEDICAID

## 2018-12-03 VITALS
TEMPERATURE: 97.5 F | HEART RATE: 93 BPM | SYSTOLIC BLOOD PRESSURE: 133 MMHG | RESPIRATION RATE: 12 BRPM | HEIGHT: 69 IN | OXYGEN SATURATION: 99 % | DIASTOLIC BLOOD PRESSURE: 87 MMHG | BODY MASS INDEX: 15.4 KG/M2 | WEIGHT: 104 LBS

## 2018-12-03 DIAGNOSIS — L70.9 ACNE, UNSPECIFIED ACNE TYPE: Primary | ICD-10-CM

## 2018-12-03 DIAGNOSIS — K59.00 CONSTIPATION, UNSPECIFIED CONSTIPATION TYPE: ICD-10-CM

## 2018-12-03 PROCEDURE — G8419 CALC BMI OUT NRM PARAM NOF/U: HCPCS | Performed by: FAMILY MEDICINE

## 2018-12-03 PROCEDURE — G8484 FLU IMMUNIZE NO ADMIN: HCPCS | Performed by: FAMILY MEDICINE

## 2018-12-03 PROCEDURE — G8427 DOCREV CUR MEDS BY ELIG CLIN: HCPCS | Performed by: FAMILY MEDICINE

## 2018-12-03 PROCEDURE — 4004F PT TOBACCO SCREEN RCVD TLK: CPT | Performed by: FAMILY MEDICINE

## 2018-12-03 PROCEDURE — 99214 OFFICE O/P EST MOD 30 MIN: CPT | Performed by: FAMILY MEDICINE

## 2018-12-03 RX ORDER — CLINDAMYCIN AND BENZOYL PEROXIDE 10; 50 MG/G; MG/G
GEL TOPICAL
Qty: 50 G | Refills: 2 | Status: SHIPPED | OUTPATIENT
Start: 2018-12-03 | End: 2019-02-11

## 2018-12-03 RX ORDER — AMOXICILLIN 250 MG
2 CAPSULE ORAL DAILY PRN
Qty: 60 TABLET | Refills: 2 | Status: SHIPPED | OUTPATIENT
Start: 2018-12-03 | End: 2019-05-02

## 2018-12-03 NOTE — PROGRESS NOTES
[facility]  Progress Note  Hawk Daniels, DO Gage Villa  1990 12/04/18    Chief Complaint:   Gage Villa is a 29 y.o. female who is here for acne and constipation    HPI:   Here for acne. Minocycline 75 mg BID has helped in past. Needs add'l help other than capsule and found family's clindamycin benzoyl peroxide is helpful. Also here to update me about constipation. Tends to have bristol stool scale 1 recently. At times bristol 2. She always recalls having chronic constipation since childhood. Trying miralax without much help. Experiencies chronic nausea but without emesis. ROS negative for headache, visionchanges, chest pain, shortness of breath, abdominal pain, urinary sx,     Past medical, surgical, and social history reviewed. Medications and allergies reviewed. Allergies   Allergen Reactions    Meloxicam Other (See Comments)     Pt states it just knocked her out      Prior to Visit Medications    Medication Sig Taking? Authorizing Provider   clindamycin-benzoyl peroxide (BENZACLIN) 1-5 % gel Apply topically 2 times daily. Yes Ana Vargas DO   senna-docusate (PERICOLACE) 8.6-50 MG per tablet Take 2 tablets by mouth daily as needed for Constipation Yes Ana Vargas DO   ibuprofen (ADVIL;MOTRIN) 800 MG tablet Take 1 tablet by mouth every 8 hours as needed for Pain Yes Ana Vargas DO   clonazePAM (KLONOPIN) 1 MG tablet Take 1 to 2 tabs po q6h prn anxiety. Do not exceed 6 tabs/24h. Chloé Vargas DO   HYDROcodone-acetaminophen (NORCO) 5-325 MG per tablet Take 1 tablet by mouth 3 times daily as needed for Pain for up to 90 doses. Chloé Vargas DO   diclofenac (SOLARAZE) 3 % gel Sig: Apply 1-2 grams TID to QID. BioMed formula #5, 240 gm, 2 RF is e-prescribed.  Yes Ana Vargas DO   loratadine (CLARITIN) 10 MG tablet Take 10 mg by mouth daily Yes Historical Provider, MD activity: Not on file     Other Topics Concern    Not on file     Social History Narrative    No narrative on file       O: /87   Pulse 93   Temp 97.5 °F (36.4 °C) (Oral)   Resp 12   Ht 5' 9\" (1.753 m)   Wt 104 lb (47.2 kg)   SpO2 99%   Breastfeeding? No   BMI 15.36 kg/m²   Physical Exam  GEN: No acute distress,cooperative, well nourished, alert. HEENT: PEERLA, EOMI , normocephalic/atraumatic, nares and oropharynx clear. Mucus membranes normal, Tympanic membranes clear bilaterally. Neck: soft, supple, no thyromegaly,mass, no Lymphadenopathy  CV: Regular rate and rhythm, no murmur, rubs, gallops. No edema. Resp: Clear to auscultation bilaterally good air entry bilaterally  No crackles, wheeze. Breathing comfortably. Psych:normal affect. Neuro: AOx3  Abd: NTTP    ASSESSMENT   Diagnosis Orders   1. Acne, unspecified acne type  clindamycin-benzoyl peroxide (BENZACLIN) 1-5 % gel   2. Constipation, unspecified constipation type  senna-docusate (PERICOLACE) 8.6-50 MG per tablet     #1 and #2: The risks, benefits, potential side effects and barriers to medication use were addressed today. Understanding was acknowledged. Patient asked to follow-up if condition(s) do not improve as anticipated. PLAN          See rest of  plan under patient instructions. Return in about 8 days (around 12/11/2018) for Chronic Pain. Patient Instructions   1) As long as the minocycline does not cause nausea, you can continue this . 2) The topical medication is now under your name for the acne. 3) Take the Pericolace for the stool softener. 4) Keep appt Dec 10 for couples therapy. 5) You can either follow-up with your doctor December 11 or before end of the month. 6) Your primary care doctor is referring you to the psychiatrist Dr. Malave          Please note a portion of this chart was generated using dragon dictation software.  Although every effort was made to ensure the accuracy of this automated

## 2018-12-03 NOTE — PATIENT INSTRUCTIONS
1) As long as the minocycline does not cause nausea, you can continue this . 2) The topical medication is now under your name for the acne. 3) Take the Pericolace for the stool softener. 4) Keep appt Dec 10 for couples therapy. 5) You can either follow-up with your doctor December 11 or before end of the month.   6) Your primary care doctor is referring you to the psychiatrist Dr. Torres Mins

## 2018-12-03 NOTE — TELEPHONE ENCOUNTER
Pt called said she didn't know you were cutting her down on [place] from 4 to 3. She fell and hit her head.  She has a headace and

## 2018-12-05 ENCOUNTER — TELEPHONE (OUTPATIENT)
Dept: FAMILY MEDICINE CLINIC | Age: 28
End: 2018-12-05

## 2018-12-05 NOTE — TELEPHONE ENCOUNTER
Submitted PA for Clindamycin Phos-Benzoyl Perox 1-5% EX GEL, Key: [id number] - PA Case ID: [id number] - Rx #: [id number]. Status: PENDING.

## 2018-12-06 NOTE — TELEPHONE ENCOUNTER
Received DENIAL for Clindamycin Phos-Benzoyl Perox 1-5% EX GEL. Denial letter attached. Please advise patient.

## 2018-12-10 ENCOUNTER — TELEPHONE (OUTPATIENT)
Dept: FAMILY MEDICINE CLINIC | Age: 28
End: 2018-12-10

## 2018-12-10 DIAGNOSIS — L70.9 ACNE, UNSPECIFIED ACNE TYPE: Primary | ICD-10-CM

## 2018-12-10 RX ORDER — CLINDAMYCIN PHOSPHATE 10 MG/G
GEL TOPICAL
Qty: 60 G | Refills: 1 | Status: SHIPPED | OUTPATIENT
Start: 2018-12-10 | End: 2018-12-17

## 2018-12-10 NOTE — TELEPHONE ENCOUNTER
Letter states I can prescribe Cleocin topical. Rx e-prescribed. Let patent know she can apply up to 2x/day, morning and night.

## 2018-12-10 NOTE — TELEPHONE ENCOUNTER
Pt called about skin medicine and it was DENIED by insurance because it does not meet medical necessity. They would like pt to try alternative. Letter is attached to this encounter. Please review    Rescheduled ov for gorge on 12/12/18 and scheduled pt an ov on 12/14/18.

## 2018-12-14 ENCOUNTER — OFFICE VISIT (OUTPATIENT)
Dept: FAMILY MEDICINE CLINIC | Age: 28
End: 2018-12-14
Payer: MEDICAID

## 2018-12-14 VITALS
WEIGHT: 107.2 LBS | DIASTOLIC BLOOD PRESSURE: 56 MMHG | SYSTOLIC BLOOD PRESSURE: 93 MMHG | TEMPERATURE: 97.7 F | OXYGEN SATURATION: 95 % | RESPIRATION RATE: 12 BRPM | BODY MASS INDEX: 15.83 KG/M2 | HEART RATE: 85 BPM

## 2018-12-14 DIAGNOSIS — K59.03 THERAPEUTIC OPIOID INDUCED CONSTIPATION: ICD-10-CM

## 2018-12-14 DIAGNOSIS — T40.2X5A THERAPEUTIC OPIOID INDUCED CONSTIPATION: ICD-10-CM

## 2018-12-14 DIAGNOSIS — M79.18 MYOFASCIAL PAIN: ICD-10-CM

## 2018-12-14 DIAGNOSIS — F41.1 GENERALIZED ANXIETY DISORDER: Primary | ICD-10-CM

## 2018-12-14 PROCEDURE — G8419 CALC BMI OUT NRM PARAM NOF/U: HCPCS | Performed by: FAMILY MEDICINE

## 2018-12-14 PROCEDURE — 99214 OFFICE O/P EST MOD 30 MIN: CPT | Performed by: FAMILY MEDICINE

## 2018-12-14 PROCEDURE — 4004F PT TOBACCO SCREEN RCVD TLK: CPT | Performed by: FAMILY MEDICINE

## 2018-12-14 PROCEDURE — G8484 FLU IMMUNIZE NO ADMIN: HCPCS | Performed by: FAMILY MEDICINE

## 2018-12-14 PROCEDURE — G8427 DOCREV CUR MEDS BY ELIG CLIN: HCPCS | Performed by: FAMILY MEDICINE

## 2018-12-14 RX ORDER — CLONAZEPAM 1 MG/1
TABLET ORAL
Qty: 180 TABLET | Refills: 0 | Status: CANCELLED | OUTPATIENT
Start: 2018-12-14 | End: 2019-01-03

## 2018-12-14 RX ORDER — NALOXONE HYDROCHLORIDE 4 MG/.1ML
1 SPRAY NASAL PRN
Qty: 2 EACH | Refills: 0 | Status: SHIPPED | OUTPATIENT
Start: 2018-12-14 | End: 2019-05-02

## 2018-12-14 RX ORDER — HYDROCODONE BITARTRATE AND ACETAMINOPHEN 5; 325 MG/1; MG/1
1 TABLET ORAL EVERY 6 HOURS PRN
Qty: 120 TABLET | Refills: 0 | Status: SHIPPED | OUTPATIENT
Start: 2018-12-23 | End: 2018-12-27 | Stop reason: SDUPTHER

## 2018-12-14 RX ORDER — HYDROCODONE BITARTRATE AND ACETAMINOPHEN 5; 325 MG/1; MG/1
1 TABLET ORAL EVERY 6 HOURS PRN
Qty: 120 TABLET | Refills: 0 | Status: SHIPPED | OUTPATIENT
Start: 2018-12-23 | End: 2018-12-14 | Stop reason: SDUPTHER

## 2018-12-14 RX ORDER — CLONAZEPAM 1 MG/1
TABLET ORAL
Qty: 180 TABLET | Refills: 0 | Status: SHIPPED | OUTPATIENT
Start: 2018-12-24 | End: 2019-01-10 | Stop reason: SDUPTHER

## 2018-12-14 NOTE — PROGRESS NOTES
History    Marital status: Single     Spouse name: N/A    Number of children: N/A    Years of education: N/A     Occupational History    Not on file. Social History Main Topics    Smoking status: Current Every Day Smoker     Packs/day: 0.15    Smokeless tobacco: Never Used    Alcohol use No    Drug use: No    Sexual activity: Not on file     Other Topics Concern    Not on file     Social History Narrative    No narrative on file       O: BP (!) 93/56   Pulse 85   Temp 97.7 °F (36.5 °C)   Resp 12   Wt 107 lb 3.2 oz (48.6 kg)   LMP 12/14/2018 (Exact Date)   SpO2 95%   Breastfeeding? No   BMI 15.83 kg/m²   Physical Exam  GEN: No acute distress,cooperative, well nourished, alert. HEENT: PEERLA, EOMI , normocephalic/atraumatic, nares and oropharynx clear. Mucus membranes normal, Tympanic membranes clear bilaterally. Neck: soft, supple, no thyromegaly,mass, no Lymphadenopathy  CV: Regular rate and rhythm, no murmur, rubs, gallops. No edema. Resp: Clear to auscultation bilaterally good air entry bilaterally  No crackles, wheeze. Breathing comfortably. Psych:dysthymic and anxious affect. Denies SI/HI. Neuro: AOx3      ASSESSMENT   Diagnosis Orders   1. Generalized anxiety disorder  naloxone 4 MG/0.1ML LIQD nasal spray    clonazePAM (KLONOPIN) 1 MG tablet    DISCONTINUED: HYDROcodone-acetaminophen (NORCO) 5-325 MG per tablet    DISCONTINUED: HYDROcodone-acetaminophen (NORCO) 5-325 MG per tablet   2. Myofascial pain  HYDROcodone-acetaminophen (NORCO) 5-325 MG per tablet   3. Therapeutic opioid induced constipation  naloxegol (MOVANTIK) 25 MG TABS tablet    linaclotide (LINZESS) 145 MCG capsule    DISCONTINUED: linaclotide (LINZESS) 145 MCG capsule     #1: The current medical regimen is effective;  continue present plan and medications. Plan to decrease from 6 tabs/24h to 4 tabs during subsequent visits.   #2: I made a decision today that in order for Kyra's daughter to have an improved

## 2018-12-17 ENCOUNTER — TELEPHONE (OUTPATIENT)
Dept: FAMILY MEDICINE CLINIC | Age: 28
End: 2018-12-17

## 2018-12-18 NOTE — TELEPHONE ENCOUNTER
Received DENIAL for "[employer]s [facility] [place] [facility]. Denial letter attached. Please advise patient.

## 2018-12-21 ENCOUNTER — CLINICAL DOCUMENTATION (OUTPATIENT)
Dept: PSYCHIATRY | Age: 28
End: 2018-12-21

## 2018-12-26 DIAGNOSIS — M79.18 MYOFASCIAL PAIN: ICD-10-CM

## 2018-12-30 RX ORDER — HYDROCODONE BITARTRATE AND ACETAMINOPHEN 5; 325 MG/1; MG/1
1 TABLET ORAL EVERY 6 HOURS PRN
Qty: 92 TABLET | Refills: 0 | Status: SHIPPED | OUTPATIENT
Start: 2018-12-30 | End: 2019-01-10 | Stop reason: SDUPTHER

## 2019-01-02 NOTE — TELEPHONE ENCOUNTER
Please review page 1 of the denial letter. Please fax the following to appropriate third party:  Carmita Nesbitt has been checked by me. I attest that patient is not opioid naive; that non-pharmacologic treatment and non-opioid analgesics are ineffective, that patient has a diagnosis of somatic pain, and benefits and risks of opioid therapy have been discussed with patient.

## 2019-01-03 ENCOUNTER — TELEPHONE (OUTPATIENT)
Dept: FAMILY MEDICINE CLINIC | Age: 29
End: 2019-01-03

## 2019-01-10 ENCOUNTER — OFFICE VISIT (OUTPATIENT)
Dept: FAMILY MEDICINE CLINIC | Age: 29
End: 2019-01-10
Payer: MEDICAID

## 2019-01-10 VITALS
HEART RATE: 87 BPM | SYSTOLIC BLOOD PRESSURE: 115 MMHG | WEIGHT: 120 LBS | BODY MASS INDEX: 17.72 KG/M2 | RESPIRATION RATE: 14 BRPM | DIASTOLIC BLOOD PRESSURE: 71 MMHG

## 2019-01-10 DIAGNOSIS — F41.1 GENERALIZED ANXIETY DISORDER: Primary | ICD-10-CM

## 2019-01-10 DIAGNOSIS — M79.18 MYOFASCIAL PAIN: ICD-10-CM

## 2019-01-10 DIAGNOSIS — J00 ACUTE RHINITIS: ICD-10-CM

## 2019-01-10 PROCEDURE — G8419 CALC BMI OUT NRM PARAM NOF/U: HCPCS | Performed by: FAMILY MEDICINE

## 2019-01-10 PROCEDURE — G8427 DOCREV CUR MEDS BY ELIG CLIN: HCPCS | Performed by: FAMILY MEDICINE

## 2019-01-10 PROCEDURE — 99214 OFFICE O/P EST MOD 30 MIN: CPT | Performed by: FAMILY MEDICINE

## 2019-01-10 PROCEDURE — G8484 FLU IMMUNIZE NO ADMIN: HCPCS | Performed by: FAMILY MEDICINE

## 2019-01-10 PROCEDURE — 4004F PT TOBACCO SCREEN RCVD TLK: CPT | Performed by: FAMILY MEDICINE

## 2019-01-10 RX ORDER — DIVALPROEX SODIUM 250 MG/1
250 TABLET, DELAYED RELEASE ORAL DAILY
COMMUNITY
End: 2019-02-11

## 2019-01-10 RX ORDER — RISPERIDONE 1 MG/1
1 TABLET, FILM COATED ORAL 2 TIMES DAILY
COMMUNITY
End: 2019-02-11

## 2019-01-10 RX ORDER — IPRATROPIUM BROMIDE 21 UG/1
2 SPRAY, METERED NASAL 3 TIMES DAILY
Qty: 1 BOTTLE | Refills: 1 | Status: SHIPPED | OUTPATIENT
Start: 2019-01-10 | End: 2019-02-06 | Stop reason: SDUPTHER

## 2019-01-10 RX ORDER — DIVALPROEX SODIUM 500 MG/1
500 TABLET, DELAYED RELEASE ORAL 2 TIMES DAILY
COMMUNITY
End: 2019-05-02

## 2019-01-10 RX ORDER — NORGESTIMATE AND ETHINYL ESTRADIOL 0.25-0.035
1 KIT ORAL DAILY
COMMUNITY
End: 2022-02-07

## 2019-01-10 RX ORDER — HYDROCODONE BITARTRATE AND ACETAMINOPHEN 5; 325 MG/1; MG/1
1 TABLET ORAL 3 TIMES DAILY
Qty: 90 TABLET | Refills: 0 | Status: SHIPPED | OUTPATIENT
Start: 2019-02-02 | End: 2019-02-11 | Stop reason: SDUPTHER

## 2019-01-10 RX ORDER — CLONAZEPAM 1 MG/1
1 TABLET ORAL 3 TIMES DAILY
Qty: 90 TABLET | Refills: 0 | Status: SHIPPED | OUTPATIENT
Start: 2019-01-24 | End: 2019-02-11 | Stop reason: SDUPTHER

## 2019-01-18 DIAGNOSIS — L70.9 ACNE, UNSPECIFIED ACNE TYPE: ICD-10-CM

## 2019-01-21 RX ORDER — MINOCYCLINE HYDROCHLORIDE 75 MG/1
CAPSULE ORAL
Qty: 60 CAPSULE | Refills: 5 | Status: SHIPPED | OUTPATIENT
Start: 2019-01-21 | End: 2020-02-25

## 2019-02-06 DIAGNOSIS — J00 ACUTE RHINITIS: ICD-10-CM

## 2019-02-07 RX ORDER — IPRATROPIUM BROMIDE 21 UG/1
SPRAY, METERED NASAL
Qty: 30 ML | Refills: 0 | Status: SHIPPED | OUTPATIENT
Start: 2019-02-07 | End: 2019-08-15 | Stop reason: ALTCHOICE

## 2019-02-11 ENCOUNTER — OFFICE VISIT (OUTPATIENT)
Dept: FAMILY MEDICINE CLINIC | Age: 29
End: 2019-02-11
Payer: MEDICAID

## 2019-02-11 VITALS
RESPIRATION RATE: 20 BRPM | OXYGEN SATURATION: 99 % | DIASTOLIC BLOOD PRESSURE: 89 MMHG | SYSTOLIC BLOOD PRESSURE: 124 MMHG | HEART RATE: 98 BPM | BODY MASS INDEX: 18.75 KG/M2 | WEIGHT: 127 LBS

## 2019-02-11 DIAGNOSIS — F41.1 GENERALIZED ANXIETY DISORDER: ICD-10-CM

## 2019-02-11 DIAGNOSIS — M79.18 MYOFASCIAL PAIN: ICD-10-CM

## 2019-02-11 PROCEDURE — G8427 DOCREV CUR MEDS BY ELIG CLIN: HCPCS | Performed by: FAMILY MEDICINE

## 2019-02-11 PROCEDURE — 99214 OFFICE O/P EST MOD 30 MIN: CPT | Performed by: FAMILY MEDICINE

## 2019-02-11 PROCEDURE — 4004F PT TOBACCO SCREEN RCVD TLK: CPT | Performed by: FAMILY MEDICINE

## 2019-02-11 PROCEDURE — G8420 CALC BMI NORM PARAMETERS: HCPCS | Performed by: FAMILY MEDICINE

## 2019-02-11 PROCEDURE — G8484 FLU IMMUNIZE NO ADMIN: HCPCS | Performed by: FAMILY MEDICINE

## 2019-02-11 RX ORDER — CLONAZEPAM 1 MG/1
1 TABLET ORAL 3 TIMES DAILY
Qty: 90 TABLET | Refills: 1 | Status: SHIPPED | OUTPATIENT
Start: 2019-02-11 | End: 2019-05-02 | Stop reason: SDUPTHER

## 2019-02-11 RX ORDER — HYDROXYZINE HYDROCHLORIDE 10 MG/1
10 TABLET, FILM COATED ORAL 4 TIMES DAILY
COMMUNITY
End: 2019-02-11

## 2019-02-11 RX ORDER — HYDROCODONE BITARTRATE AND ACETAMINOPHEN 5; 325 MG/1; MG/1
1 TABLET ORAL EVERY 6 HOURS PRN
Qty: 105 TABLET | Refills: 0 | Status: SHIPPED | OUTPATIENT
Start: 2019-02-26 | End: 2019-03-26 | Stop reason: SDUPTHER

## 2019-02-11 ASSESSMENT — PATIENT HEALTH QUESTIONNAIRE - PHQ9
SUM OF ALL RESPONSES TO PHQ9 QUESTIONS 1 & 2: 0
1. LITTLE INTEREST OR PLEASURE IN DOING THINGS: 0
SUM OF ALL RESPONSES TO PHQ QUESTIONS 1-9: 0
SUM OF ALL RESPONSES TO PHQ QUESTIONS 1-9: 0
2. FEELING DOWN, DEPRESSED OR HOPELESS: 0

## 2019-03-13 DIAGNOSIS — M79.18 MYOFASCIAL PAIN: ICD-10-CM

## 2019-03-13 RX ORDER — IBUPROFEN 800 MG/1
800 TABLET ORAL EVERY 8 HOURS PRN
Qty: 60 TABLET | Refills: 0 | Status: SHIPPED | OUTPATIENT
Start: 2019-03-13 | End: 2019-04-04 | Stop reason: SDUPTHER

## 2019-03-25 ENCOUNTER — TELEPHONE (OUTPATIENT)
Dept: FAMILY MEDICINE CLINIC | Age: 29
End: 2019-03-25

## 2019-03-25 DIAGNOSIS — M79.18 MYOFASCIAL PAIN: ICD-10-CM

## 2019-03-26 RX ORDER — HYDROCODONE BITARTRATE AND ACETAMINOPHEN 5; 325 MG/1; MG/1
1 TABLET ORAL EVERY 6 HOURS PRN
Qty: 105 TABLET | Refills: 0 | Status: SHIPPED | OUTPATIENT
Start: 2019-03-26 | End: 2019-04-23 | Stop reason: SDUPTHER

## 2019-04-04 DIAGNOSIS — M79.18 MYOFASCIAL PAIN: ICD-10-CM

## 2019-04-04 RX ORDER — IBUPROFEN 800 MG/1
800 TABLET ORAL EVERY 8 HOURS PRN
Qty: 60 TABLET | Refills: 5 | Status: SHIPPED | OUTPATIENT
Start: 2019-04-04 | End: 2019-09-07 | Stop reason: SDUPTHER

## 2019-04-21 DIAGNOSIS — F41.1 GENERALIZED ANXIETY DISORDER: ICD-10-CM

## 2019-04-22 DIAGNOSIS — F41.1 GENERALIZED ANXIETY DISORDER: ICD-10-CM

## 2019-04-22 DIAGNOSIS — M79.18 MYOFASCIAL PAIN: ICD-10-CM

## 2019-04-22 RX ORDER — DULOXETIN HYDROCHLORIDE 60 MG/1
CAPSULE, DELAYED RELEASE ORAL
Qty: 30 CAPSULE | Refills: 0 | Status: SHIPPED | OUTPATIENT
Start: 2019-04-22 | End: 2019-05-20 | Stop reason: SDUPTHER

## 2019-04-22 RX ORDER — DULOXETIN HYDROCHLORIDE 30 MG/1
CAPSULE, DELAYED RELEASE ORAL
Qty: 30 CAPSULE | Refills: 0 | Status: SHIPPED | OUTPATIENT
Start: 2019-04-22 | End: 2019-05-20 | Stop reason: SDUPTHER

## 2019-04-23 DIAGNOSIS — M79.18 MYOFASCIAL PAIN: ICD-10-CM

## 2019-04-24 ENCOUNTER — TELEPHONE (OUTPATIENT)
Dept: FAMILY MEDICINE CLINIC | Age: 29
End: 2019-04-24

## 2019-04-24 DIAGNOSIS — M25.50 HYPERMOBILITY ARTHRALGIA: Primary | ICD-10-CM

## 2019-04-24 DIAGNOSIS — M79.18 MYOFASCIAL PAIN: ICD-10-CM

## 2019-04-24 DIAGNOSIS — M35.9 UNDIFFERENTIATED CONNECTIVE TISSUE DISEASE (HCC): ICD-10-CM

## 2019-04-24 RX ORDER — HYDROCODONE BITARTRATE AND ACETAMINOPHEN 5; 325 MG/1; MG/1
1 TABLET ORAL EVERY 6 HOURS PRN
Qty: 105 TABLET | Refills: 0 | Status: SHIPPED | OUTPATIENT
Start: 2019-04-25 | End: 2019-04-25 | Stop reason: SDUPTHER

## 2019-04-24 NOTE — TELEPHONE ENCOUNTER
I'm not ready to refill the opioid. At her February appointment I expressed to her that I wanted to see her in a 2 month follow-up in April. Is there a reason why she was not able to make the April appointment? [place] was [place] on 04/24/19 and activity was consistent with most recent fill date 3/26.

## 2019-04-24 NOTE — TELEPHONE ENCOUNTER
Office has been notified that pt is requiring Prior Authorization for the following medication:  -- HYDROcodone-acetaminophen (Robsusanne Fredo) 5-325 MG per tablet     Please initiate this request through CoverMyMeds, contacting the following Payor/Insurance:  -- [facility], ID# [id number]    Please see below, or the documentation attached to this encounter for any additional information that may assist in processing PA:  -- Pt has been taking this medication since 4/4/18. Insurance is rejecting anything more than a 7 day supply per attached pharmacy PA request.    Thank you!

## 2019-04-24 NOTE — TELEPHONE ENCOUNTER
Patient said that she didn't know that she was suppose to return in April. She said she just got a car today but has the May appointment. Can meds be refilled until her appt time.

## 2019-04-24 NOTE — TELEPHONE ENCOUNTER
Medication is renewed. Let her know and she needs to keep May 16 appointment. Medication   HYDROcodone-acetaminophen (NORCO) 5-325 MG per tablet [[id number]]   HYDROcodone-acetaminophen (Lilliana Dimes) 5-325 MG per tablet [[id number]]     Order Details   Dose: 1 tablet Route: Oral Frequency: EVERY 6 HOURS PRN for Pain   Note to Pharmacy:   Reduce doses taken as pain becomes manageable        Dispense Quantity: 105 tablet Refills: 0 Fills remaining: --           Sig: Take 1 tablet by mouth every 6 hours as needed for Pain for up to 30 days.          Written Date: 04/24/19 Expiration Date: 06/23/19     Start Date: 04/25/19 End Date: 05/25/19     Earliest Fill Date: 04/25/19      Ordering Provider:  -- Authorizing Provider:  Zahra Mckeon DO Ordering User:  Zahra Mckeon DO          Diagnosis Association: Myofascial pain (M79.18)      Original Order:  HYDROcodone-acetaminophen (Lilliana Dimes) 5-325 MG per tablet [[id number]]      Pharmacy:  [place] [facility] [id number] [address] [phone] - F [phone]      Pharmacy Comments:  --          Fill quantity remaining:  -- Fill quantity used:  --        Outpatient Morphine Equivalent Daily Dose (MEDD)     4/25/19 - 5/25/19   20 mg MEDD   Order Name Dose Route Frequency Maximum MEDD    HYDROcodone-acetaminophen (NORCO) 5-325 MG per tablet 1 tablet Oral EVERY 6 HOURS PRN 20 mg MEDD   Total Potential Daily Morphine Equivalence 20 mg MEDD   Calculation Information                5/26/19 and after   None   Order Class:     Order Class   Normal [1]   Warnings History     No Interaction Warnings Shown    HYDROcodone-acetaminophen (Lilliana Dimes) 5-325 MG per tablet   Date: 4/24/2019 Department: [facility] Ordering/Authorizing: Zahra Mckeon DO   Outpatient Medication Detail      Disp Refills Start End    HYDROcodone-acetaminophen (NORCO) 5-325 MG per tablet 105 tablet 0 4/25/2019 5/25/2019    Sig - Route:  Take 1 tablet by mouth

## 2019-04-25 PROBLEM — M25.50 HYPERMOBILITY ARTHRALGIA: Status: ACTIVE | Noted: 2019-04-25

## 2019-04-25 PROBLEM — M35.9 UNDIFFERENTIATED CONNECTIVE TISSUE DISEASE (HCC): Status: ACTIVE | Noted: 2019-04-25

## 2019-04-25 RX ORDER — HYDROCODONE BITARTRATE AND ACETAMINOPHEN 5; 325 MG/1; MG/1
1 TABLET ORAL EVERY 6 HOURS PRN
Qty: 105 TABLET | Refills: 0 | Status: SHIPPED | OUTPATIENT
Start: 2019-04-25 | End: 2019-05-02 | Stop reason: SDUPTHER

## 2019-04-25 NOTE — TELEPHONE ENCOUNTER
I have associated the prescription with symptomatic pain due to #1 and #3. Diagnosis Orders   1. Hypermobility arthralgia  HYDROcodone-acetaminophen (NORCO) 5-325 MG per tablet   2. Myofascial pain  HYDROcodone-acetaminophen (NORCO) 5-325 MG per tablet   3. Undifferentiated connective tissue disease ([facility] [address]  HYDROcodone-acetaminophen (NORCO) 5-325 MG per tablet       I will attach this telephone encounter with the paperwork for the insurance review.     Let the patient know that I sent the appropriate documentation to her insurance plan for review

## 2019-04-29 ENCOUNTER — TELEPHONE (OUTPATIENT)
Dept: FAMILY MEDICINE CLINIC | Age: 29
End: 2019-04-29

## 2019-05-02 ENCOUNTER — OFFICE VISIT (OUTPATIENT)
Dept: FAMILY MEDICINE CLINIC | Age: 29
End: 2019-05-02
Payer: MEDICAID

## 2019-05-02 DIAGNOSIS — M79.18 MYOFASCIAL PAIN: ICD-10-CM

## 2019-05-02 DIAGNOSIS — F41.1 GENERALIZED ANXIETY DISORDER: ICD-10-CM

## 2019-05-02 DIAGNOSIS — M25.50 HYPERMOBILITY ARTHRALGIA: ICD-10-CM

## 2019-05-02 DIAGNOSIS — M35.9 UNDIFFERENTIATED CONNECTIVE TISSUE DISEASE (HCC): ICD-10-CM

## 2019-05-02 PROCEDURE — 4004F PT TOBACCO SCREEN RCVD TLK: CPT | Performed by: FAMILY MEDICINE

## 2019-05-02 PROCEDURE — 99214 OFFICE O/P EST MOD 30 MIN: CPT | Performed by: FAMILY MEDICINE

## 2019-05-02 PROCEDURE — G8420 CALC BMI NORM PARAMETERS: HCPCS | Performed by: FAMILY MEDICINE

## 2019-05-02 PROCEDURE — G8427 DOCREV CUR MEDS BY ELIG CLIN: HCPCS | Performed by: FAMILY MEDICINE

## 2019-05-02 RX ORDER — QUETIAPINE 150 MG/1
150 TABLET, FILM COATED, EXTENDED RELEASE ORAL NIGHTLY
COMMUNITY
End: 2019-05-14

## 2019-05-02 RX ORDER — LAMOTRIGINE 25 MG/1
25 TABLET ORAL DAILY
COMMUNITY
End: 2020-01-10 | Stop reason: ALTCHOICE

## 2019-05-02 RX ORDER — CLONAZEPAM 1 MG/1
1 TABLET ORAL 3 TIMES DAILY
Qty: 90 TABLET | Refills: 1 | Status: SHIPPED | OUTPATIENT
Start: 2019-05-02 | End: 2019-07-26 | Stop reason: SDUPTHER

## 2019-05-02 RX ORDER — HYDROCODONE BITARTRATE AND ACETAMINOPHEN 5; 325 MG/1; MG/1
1 TABLET ORAL EVERY 6 HOURS PRN
Qty: 105 TABLET | Refills: 0 | Status: SHIPPED | OUTPATIENT
Start: 2019-05-02 | End: 2019-05-14 | Stop reason: SDUPTHER

## 2019-05-02 NOTE — PROGRESS NOTES
allergies      Past Surgical History:   Procedure Laterality Date    TONSILLECTOMY  2007     Family History   Problem Relation Age of Onset    Other Mother         mixed tissue disorder     Social History     Socioeconomic History    Marital status: Single     Spouse name: Not on file    Number of children: Not on file    Years of education: Not on file    Highest education level: Not on file   Occupational History    Not on file   Social Needs    Financial resource strain: Not on file    Food insecurity:     Worry: Not on file     Inability: Not on file    Transportation needs:     Medical: Not on file     Non-medical: Not on file   Tobacco Use    Smoking status: Current Every Day Smoker     Packs/day: 0.15    Smokeless tobacco: Never Used   Substance and Sexual Activity    Alcohol use: No    Drug use: No    Sexual activity: Not on file   Lifestyle    Physical activity:     Days per week: Not on file     Minutes per session: Not on file    Stress: Not on file   Relationships    Social connections:     Talks on phone: Not on file     Gets together: Not on file     Attends Bahai service: Not on file     Active member of club or organization: Not on file     Attends meetings of clubs or organizations: Not on file     Relationship status: Not on file    Intimate partner violence:     Fear of current or ex partner: Not on file     Emotionally abused: Not on file     Physically abused: Not on file     Forced sexual activity: Not on file   Other Topics Concern    Not on file   Social History Narrative    Not on file       O: There were no vitals taken for this visit. Physical Exam  GEN: No acute distress,cooperative, well nourished, alert. HEENT: PEERLA, EOMI , normocephalic/atraumatic, nares and oropharynx clear. Mucus membranes normal, Tympanic membranes clear bilaterally. Neck: soft, supple, no thyromegaly,mass, no Lymphadenopathy  CV: Regular rate and rhythm, no murmur, rubs, gallops. No edema. Resp: Clear to auscultation bilaterally good air entry bilaterally  No crackles, wheeze. Breathing comfortably. Psych: Blunted affect. Denies SI/HI. Neuro: AOx3      ASSESSMENT   Diagnosis Orders   1. Myofascial pain  HYDROcodone-acetaminophen (NORCO) 5-325 MG per tablet   2. Hypermobility arthralgia  HYDROcodone-acetaminophen (NORCO) 5-325 MG per tablet   3. Undifferentiated connective tissue disease (HCC)  HYDROcodone-acetaminophen (NORCO) 5-325 MG per tablet   4. Generalized anxiety disorder  clonazePAM (KLONOPIN) 1 MG tablet     #1-3:  I printed out a  prescription for next 30 days supply of hydrocodone. The risks, benefits, potential side effects and barriers to medication use were addressed today. Understanding was acknowledged. Patient asked to follow-up if condition(s) do not improve as anticipated. Already has follow-up appointment with me in May 14. Will keep that appointment for now. .  #4: The current medical regimen is effective;  continue present plan and medications. Pt aware of need for every 3 month medication followup appointments, and that medication refills for benzodiazepines, narcotics and/or stimulants will only be given at appointment. PLAN          See rest of plan under patient instructions. No follow-ups on file. There are no Patient Instructions on file for this visit. Please note a portion of this chart was generated using dragon dictation software. Although every effort was made to ensure the accuracy of this automated transcription,some errors in transcription may have occurred.

## 2019-05-14 ENCOUNTER — TELEPHONE (OUTPATIENT)
Dept: FAMILY MEDICINE CLINIC | Age: 29
End: 2019-05-14

## 2019-05-14 ENCOUNTER — OFFICE VISIT (OUTPATIENT)
Dept: FAMILY MEDICINE CLINIC | Age: 29
End: 2019-05-14
Payer: MEDICAID

## 2019-05-14 VITALS
OXYGEN SATURATION: 99 % | DIASTOLIC BLOOD PRESSURE: 70 MMHG | SYSTOLIC BLOOD PRESSURE: 99 MMHG | TEMPERATURE: 98 F | BODY MASS INDEX: 21.93 KG/M2 | WEIGHT: 148.5 LBS | HEART RATE: 81 BPM

## 2019-05-14 DIAGNOSIS — F39 MOOD DISORDER (HCC): Primary | ICD-10-CM

## 2019-05-14 DIAGNOSIS — M35.9 UNDIFFERENTIATED CONNECTIVE TISSUE DISEASE (HCC): ICD-10-CM

## 2019-05-14 DIAGNOSIS — M25.50 HYPERMOBILITY ARTHRALGIA: ICD-10-CM

## 2019-05-14 DIAGNOSIS — M79.18 MYOFASCIAL PAIN: ICD-10-CM

## 2019-05-14 PROCEDURE — 99213 OFFICE O/P EST LOW 20 MIN: CPT | Performed by: FAMILY MEDICINE

## 2019-05-14 PROCEDURE — 4004F PT TOBACCO SCREEN RCVD TLK: CPT | Performed by: FAMILY MEDICINE

## 2019-05-14 PROCEDURE — G8427 DOCREV CUR MEDS BY ELIG CLIN: HCPCS | Performed by: FAMILY MEDICINE

## 2019-05-14 PROCEDURE — G8420 CALC BMI NORM PARAMETERS: HCPCS | Performed by: FAMILY MEDICINE

## 2019-05-14 RX ORDER — LAMOTRIGINE 100 MG/1
100 TABLET ORAL DAILY
Qty: 30 TABLET | Refills: 3 | Status: SHIPPED | OUTPATIENT
Start: 2019-05-14 | End: 2019-08-15 | Stop reason: ALTCHOICE

## 2019-05-14 RX ORDER — LORATADINE 10 MG/1
10 TABLET ORAL DAILY
Qty: 30 TABLET | Refills: 0 | Status: SHIPPED | OUTPATIENT
Start: 2019-05-14 | End: 2019-06-13

## 2019-05-14 RX ORDER — HYDROCODONE BITARTRATE AND ACETAMINOPHEN 5; 325 MG/1; MG/1
1 TABLET ORAL EVERY 6 HOURS PRN
Qty: 105 TABLET | Refills: 0 | Status: SHIPPED | OUTPATIENT
Start: 2019-06-01 | End: 2019-08-05 | Stop reason: SDUPTHER

## 2019-05-14 NOTE — PATIENT INSTRUCTIONS
1) Pain medication is renewed for the June script. 2) Move to the 100 mg Lamictal.  3) Follow-up at the end of June or early July.

## 2019-05-14 NOTE — PROGRESS NOTES
[facility]  Progress Note  Monica Xavier DO Cameron Restrepo  1990 05/14/19    Chief Complaint:   Cameron Restrepo is a 29 y.o. 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 Was scheduled to see the [id number] [place] [address] psychiatrist at this location but due to insurance not being accepted patient has to find a different psychiatrist.  She will run out for the Lamictal soon. Directions have instructions to take 100 mg daily. CHRONIC PAIN REVIEW & ASSESSMENT:  Due to chronic/continued usage of narcotic pain medications, the following questions/discussion occurred. 1) How are you doing in performing your activities of daily living? Well. 2) Any side effects of your medications? no  3) Does your pain medicine prove adequate analgesia/pain control? Yes  4) Any aberrant behavior to warrant concern? no     Progress with treatment goals:   --opiate medications are part of an overall plan for patient rehabilitation, including ability to perform a home exercise program and perform necessary activities of daily living.   -- Cameron Restrepo continues to experience relief with combination of opiate medications and home exercise program and is able to maintain activities and ability to function. Based on an analysis of risks, benefits, and alternatives, continuation of prescription of opiates appears appropriate for management of her chronic pain. Reason/medically necessity for prescription of more than one controlled substance: Yes. Discussed that other medications such as benzodiazepines, sedative-hypnotics, or barbiturates may interact with opiates.  Potential risks include increased opiate toxicity, increased sleep apnea risk, and increased risk of overdose deaths and other potential adverse effects. The patient should keep all physicians informed of all medications. ROS negative for headache, visionchanges, chest pain, shortness of breath, abdominal pain, urinary sx, bowel changes. Past medical, surgical, and social history reviewed. Medications and allergies reviewed. Allergies   Allergen Reactions    Meloxicam Other (See Comments)     Pt states it just knocked her out      Prior to Visit Medications    Medication Sig Taking? Authorizing Provider   loratadine (CLARITIN) 10 MG tablet Take 1 tablet by mouth daily Yes Anthony Vargas DO   HYDROcodone-acetaminophen (NORCO) 5-325 MG per tablet Take 1 tablet by mouth every 6 hours as needed for Pain for up to 30 days. Yes Anthony Vargas DO   lamoTRIgine (LAMICTAL) 100 MG tablet Take 1 tablet by mouth daily Yes Anthony Vargas DO   lamoTRIgine (LAMICTAL) 25 MG tablet Take 25 mg by mouth daily Indications: 1qam x 14 days, then 2 x 14 days, 4 every am x 7 days Yes Historical Provider, MD   clonazePAM (KLONOPIN) 1 MG tablet Take 1 tablet by mouth 3 times daily for 30 days. Yes Anthony Vargas DO   DULoxetine (CYMBALTA) 60 MG extended release capsule TAKE ONE CAPSULE BY MOUTH EVERY DAY(ALONG WITH A 30MG.  CAPSULE) Yes Anthony Vargas DO   DULoxetine (CYMBALTA) 30 MG extended release capsule TAKE ONE CAPSULE BY MOUTH EVERY DAY WITH THE 60 MG DULOXETINE DOSE Yes Anthony aVrgas DO   ibuprofen (ADVIL;MOTRIN) 800 MG tablet TAKE 1 TABLET BY MOUTH EVERY 8 HOURS AS NEEDED FOR PAIN Yes Anthony Vargas DO   ipratropium (ATROVENT) 0.03 % nasal spray USE 2 SPRAYS IN EACH NOSTRIL THREE TIMES DAILY Yes Anthony Vargas DO   minocycline (MINOCIN;DYNACIN) 75 MG capsule TAKE 1 CAPSULE BY MOUTH TWICE DAILY Yes Anthony Vargas DO   norgestimate-ethinyl estradiol (ESTARYLLA) 0.25-35 MG-MCG per tablet Take 1 tablet by mouth daily Yes Historical Provider, MD   diclofenac (SOLARAZE) 3 % gel Sig: Apply 1-2 grams TID to QID. BioMed formula #5, 240 gm, 2 RF is e-prescribed.  Yes Myrna Vargas,           Vitals:    05/14/19 1114   BP: 99/70   Pulse: 81   Temp: 98 °F (36.7 °C)   TempSrc: Oral   SpO2: 99%   Weight: 148 lb 8 oz (67.4 kg)      Wt Readings from Last 3 Encounters:   05/14/19 148 lb 8 oz (67.4 kg)   02/11/19 127 lb (57.6 kg)   01/10/19 120 lb (54.4 kg)     BP Readings from Last 3 Encounters:   05/14/19 99/70   02/11/19 124/89   01/10/19 115/71       Patient Active Problem List   Diagnosis    Mood disorder (HCC)    Acne    Myofascial pain    Neck swelling    History of sinus tachycardia    Tobacco use    Generalized anxiety disorder    Cigarette nicotine dependence with nicotine-induced disorder    Hypermobility arthralgia    Undifferentiated connective tissue disease (HCC)       Immunization History   Administered Date(s) Administered    Influenza, Quadv, 3 Years and older, IM (Fluzone 3 yrs and older or Afluria 5 yrs and older) 12/05/2017       Past Medical History:   Diagnosis Date    Seasonal allergies      Past Surgical History:   Procedure Laterality Date    TONSILLECTOMY  2007     Family History   Problem Relation Age of Onset    Other Mother         mixed tissue disorder     Social History     Socioeconomic History    Marital status: Single     Spouse name: Not on file    Number of children: Not on file    Years of education: Not on file    Highest education level: Not on file   Occupational History    Not on file   Social Needs    Financial resource strain: Not on file    Food insecurity:     Worry: Not on file     Inability: Not on file    Transportation needs:     Medical: Not on file     Non-medical: Not on file   Tobacco Use    Smoking status: Current Every Day Smoker     Packs/day: 0.15    Smokeless tobacco: Never Used   Substance and Sexual Activity    Alcohol use: No    Drug use: No    Sexual activity: Not on file   Lifestyle    Physical activity:     Days per week: Not on file     Minutes per session: Not on file    Stress: Not on file   Relationships    Social connections:     Talks on phone: Not on file     Gets together: Not on file     Attends Mormon service: Not on file     Active member of club or organization: Not on file     Attends meetings of clubs or organizations: Not on file     Relationship status: Not on file    Intimate partner violence:     Fear of current or ex partner: Not on file     Emotionally abused: Not on file     Physically abused: Not on file     Forced sexual activity: Not on file   Other Topics Concern    Not on file   Social History Narrative    Not on file       O: BP 99/70   Pulse 81   Temp 98 °F (36.7 °C) (Oral)   Wt 148 lb 8 oz (67.4 kg)   LMP 04/30/2019   SpO2 99%   BMI 21.93 kg/m²   Physical Exam  GEN: No acute distress,cooperative, well nourished, alert. HEENT: PEERLA, EOMI , normocephalic/atraumatic, nares and oropharynx clear. Mucus membranes normal, Tympanic membranes clear bilaterally. Neck: soft, supple, no thyromegaly,mass, no Lymphadenopathy  CV: Regular rate and rhythm, no murmur, rubs, gallops. No edema. Resp: Clear to auscultation bilaterally good air entry bilaterally  No crackles, wheeze. Breathing comfortably. Psych:normal affect. Neuro: AOx3      ASSESSMENT   Diagnosis Orders   1. Mood disorder (HCC)  lamoTRIgine (LAMICTAL) 100 MG tablet   2. Myofascial pain  HYDROcodone-acetaminophen (NORCO) 5-325 MG per tablet   3. Hypermobility arthralgia  HYDROcodone-acetaminophen (NORCO) 5-325 MG per tablet   4. Undifferentiated connective tissue disease ([facility] [address]  HYDROcodone-acetaminophen (NORCO) 5-325 MG per tablet     #1: Patient requests assistance finding psychiatrist in that work with her insurance plan. Will go ahead and bridge prescribe the Lamictal.  It should be at this time on 100 mg daily.   #2-4: The risks, benefits, potential side effects and barriers to medication use were addressed today. Understanding was acknowledged. Patient asked to follow-up if condition(s) do not improve as anticipated. The current medical regimen is effective;  continue present plan and medications. Prior authorization paperwork received by this office yesterday by fax. We'll work to get this completed this month so that the June prescription could hopefully be low tear free to her. She is to good Rx coupon to pay for the May prescription at $35. PLAN          See rest of plan under patient instructions. No follow-ups on file. Patient Instructions   1) Pain medication is renewed for the June script. 2) Move to the 100 mg Lamictal.  3) Follow-up at the end of June or early July. Please note a portion of this chart was generated using dragon dictation software. Although every effort was made to ensure the accuracy of this automated transcription,some errors in transcription may have occurred.

## 2019-05-20 DIAGNOSIS — M79.18 MYOFASCIAL PAIN: ICD-10-CM

## 2019-05-20 DIAGNOSIS — F41.1 GENERALIZED ANXIETY DISORDER: ICD-10-CM

## 2019-05-20 RX ORDER — DULOXETIN HYDROCHLORIDE 60 MG/1
CAPSULE, DELAYED RELEASE ORAL
Qty: 30 CAPSULE | Refills: 0 | Status: SHIPPED | OUTPATIENT
Start: 2019-05-20 | End: 2019-06-21 | Stop reason: SDUPTHER

## 2019-05-20 RX ORDER — DULOXETIN HYDROCHLORIDE 30 MG/1
CAPSULE, DELAYED RELEASE ORAL
Qty: 30 CAPSULE | Refills: 0 | Status: SHIPPED | OUTPATIENT
Start: 2019-05-20 | End: 2019-06-21 | Stop reason: SDUPTHER

## 2019-06-05 NOTE — TELEPHONE ENCOUNTER
PA Appeal Approval received from [facility] for patient, and is scanned and attached to this encounter. It appears patient can now fill this medication when needed. Making sure PCP Sam is aware. Thank you!

## 2019-06-21 DIAGNOSIS — F41.1 GENERALIZED ANXIETY DISORDER: ICD-10-CM

## 2019-06-21 DIAGNOSIS — M79.18 MYOFASCIAL PAIN: ICD-10-CM

## 2019-06-24 RX ORDER — DULOXETIN HYDROCHLORIDE 60 MG/1
CAPSULE, DELAYED RELEASE ORAL
Qty: 30 CAPSULE | Refills: 0 | Status: SHIPPED | OUTPATIENT
Start: 2019-06-24 | End: 2019-07-22 | Stop reason: SDUPTHER

## 2019-06-24 RX ORDER — DULOXETIN HYDROCHLORIDE 30 MG/1
CAPSULE, DELAYED RELEASE ORAL
Qty: 30 CAPSULE | Refills: 0 | Status: SHIPPED | OUTPATIENT
Start: 2019-06-24 | End: 2019-07-22 | Stop reason: SDUPTHER

## 2019-07-02 ENCOUNTER — TELEPHONE (OUTPATIENT)
Dept: FAMILY MEDICINE CLINIC | Age: 29
End: 2019-07-02

## 2019-07-02 DIAGNOSIS — M35.9 UNDIFFERENTIATED CONNECTIVE TISSUE DISEASE (HCC): ICD-10-CM

## 2019-07-02 DIAGNOSIS — M25.50 HYPERMOBILITY ARTHRALGIA: ICD-10-CM

## 2019-07-02 DIAGNOSIS — M79.18 MYOFASCIAL PAIN: ICD-10-CM

## 2019-07-02 RX ORDER — HYDROCODONE BITARTRATE AND ACETAMINOPHEN 5; 325 MG/1; MG/1
1 TABLET ORAL EVERY 6 HOURS PRN
Qty: 105 TABLET | Refills: 0 | Status: SHIPPED | OUTPATIENT
Start: 2019-07-02 | End: 2019-08-05 | Stop reason: SDUPTHER

## 2019-07-03 NOTE — TELEPHONE ENCOUNTER
Due to financial constraints patient needs to use [facility]. Prescription is renewed for 105 tablets. Needs appt to be seen anytime in July for medication and pain management. Please schedule.

## 2019-07-22 DIAGNOSIS — F41.1 GENERALIZED ANXIETY DISORDER: ICD-10-CM

## 2019-07-22 DIAGNOSIS — M79.18 MYOFASCIAL PAIN: ICD-10-CM

## 2019-07-22 RX ORDER — DULOXETIN HYDROCHLORIDE 60 MG/1
CAPSULE, DELAYED RELEASE ORAL
Qty: 30 CAPSULE | Refills: 0 | Status: SHIPPED | OUTPATIENT
Start: 2019-07-22 | End: 2019-08-19 | Stop reason: SDUPTHER

## 2019-07-22 RX ORDER — DULOXETIN HYDROCHLORIDE 30 MG/1
CAPSULE, DELAYED RELEASE ORAL
Qty: 30 CAPSULE | Refills: 0 | Status: SHIPPED | OUTPATIENT
Start: 2019-07-22 | End: 2019-08-19 | Stop reason: SDUPTHER

## 2019-07-25 DIAGNOSIS — F41.1 GENERALIZED ANXIETY DISORDER: ICD-10-CM

## 2019-07-26 RX ORDER — CLONAZEPAM 1 MG/1
1 TABLET ORAL 3 TIMES DAILY
Qty: 90 TABLET | Refills: 1 | Status: SHIPPED | OUTPATIENT
Start: 2019-07-26 | End: 2019-08-05 | Stop reason: SDUPTHER

## 2019-07-26 NOTE — TELEPHONE ENCOUNTER
PDMP reviewed. It is consistent. Will make exception. Is on BZD and opioids. Needs close f/u. Was suppose to see me in July. I approved script. If cancels Aug appt I will not be able to approve other CS. Let her know.

## 2019-08-05 ENCOUNTER — OFFICE VISIT (OUTPATIENT)
Dept: FAMILY MEDICINE CLINIC | Age: 29
End: 2019-08-05
Payer: MEDICAID

## 2019-08-05 VITALS
HEIGHT: 69 IN | OXYGEN SATURATION: 98 % | WEIGHT: 133.8 LBS | DIASTOLIC BLOOD PRESSURE: 64 MMHG | RESPIRATION RATE: 14 BRPM | BODY MASS INDEX: 19.82 KG/M2 | SYSTOLIC BLOOD PRESSURE: 110 MMHG | HEART RATE: 102 BPM

## 2019-08-05 DIAGNOSIS — M79.18 MYOFASCIAL PAIN: ICD-10-CM

## 2019-08-05 DIAGNOSIS — M25.50 HYPERMOBILITY ARTHRALGIA: ICD-10-CM

## 2019-08-05 DIAGNOSIS — F41.1 GENERALIZED ANXIETY DISORDER: ICD-10-CM

## 2019-08-05 DIAGNOSIS — M35.9 UNDIFFERENTIATED CONNECTIVE TISSUE DISEASE (HCC): ICD-10-CM

## 2019-08-05 PROCEDURE — G8420 CALC BMI NORM PARAMETERS: HCPCS | Performed by: FAMILY MEDICINE

## 2019-08-05 PROCEDURE — 99214 OFFICE O/P EST MOD 30 MIN: CPT | Performed by: FAMILY MEDICINE

## 2019-08-05 PROCEDURE — G8427 DOCREV CUR MEDS BY ELIG CLIN: HCPCS | Performed by: FAMILY MEDICINE

## 2019-08-05 PROCEDURE — 4004F PT TOBACCO SCREEN RCVD TLK: CPT | Performed by: FAMILY MEDICINE

## 2019-08-05 RX ORDER — HYDROCODONE BITARTRATE AND ACETAMINOPHEN 5; 325 MG/1; MG/1
1 TABLET ORAL EVERY 6 HOURS PRN
Qty: 120 TABLET | Refills: 0 | Status: SHIPPED | OUTPATIENT
Start: 2019-09-06 | End: 2019-08-05 | Stop reason: ALTCHOICE

## 2019-08-05 RX ORDER — NORTRIPTYLINE HYDROCHLORIDE 25 MG/1
25 CAPSULE ORAL
COMMUNITY
End: 2020-01-10 | Stop reason: ALTCHOICE

## 2019-08-05 RX ORDER — HYDROXYCHLOROQUINE SULFATE 200 MG/1
200 TABLET, FILM COATED ORAL
COMMUNITY
End: 2020-01-10 | Stop reason: SDUPTHER

## 2019-08-05 RX ORDER — HYDROCODONE BITARTRATE AND ACETAMINOPHEN 5; 325 MG/1; MG/1
1 TABLET ORAL EVERY 6 HOURS PRN
Qty: 120 TABLET | Refills: 0 | Status: SHIPPED | OUTPATIENT
Start: 2019-08-07 | End: 2019-08-05 | Stop reason: ALTCHOICE

## 2019-08-05 RX ORDER — OXYCODONE HYDROCHLORIDE AND ACETAMINOPHEN 5; 325 MG/1; MG/1
1 TABLET ORAL EVERY 8 HOURS PRN
Qty: 90 TABLET | Refills: 0 | Status: SHIPPED | OUTPATIENT
Start: 2019-08-05 | End: 2019-08-15 | Stop reason: ALTCHOICE

## 2019-08-05 RX ORDER — HYDROCODONE BITARTRATE AND ACETAMINOPHEN 5; 325 MG/1; MG/1
1 TABLET ORAL EVERY 6 HOURS PRN
Qty: 105 TABLET | Refills: 0 | Status: SHIPPED | OUTPATIENT
Start: 2019-09-06 | End: 2019-08-05 | Stop reason: SDUPTHER

## 2019-08-05 RX ORDER — HYDROCODONE BITARTRATE AND ACETAMINOPHEN 5; 325 MG/1; MG/1
1 TABLET ORAL EVERY 6 HOURS PRN
Qty: 105 TABLET | Refills: 0 | Status: SHIPPED | OUTPATIENT
Start: 2019-08-07 | End: 2019-08-05 | Stop reason: ALTCHOICE

## 2019-08-05 RX ORDER — CLONAZEPAM 1 MG/1
1 TABLET ORAL 3 TIMES DAILY
Qty: 90 TABLET | Refills: 1 | Status: SHIPPED | OUTPATIENT
Start: 2019-08-05 | End: 2020-01-10

## 2019-08-15 ENCOUNTER — OFFICE VISIT (OUTPATIENT)
Dept: FAMILY MEDICINE CLINIC | Age: 29
End: 2019-08-15
Payer: MEDICAID

## 2019-08-15 VITALS
SYSTOLIC BLOOD PRESSURE: 105 MMHG | HEART RATE: 99 BPM | BODY MASS INDEX: 19.27 KG/M2 | WEIGHT: 134.6 LBS | HEIGHT: 70 IN | DIASTOLIC BLOOD PRESSURE: 75 MMHG | OXYGEN SATURATION: 98 %

## 2019-08-15 DIAGNOSIS — M79.18 MYOFASCIAL PAIN: ICD-10-CM

## 2019-08-15 DIAGNOSIS — M35.9 UNDIFFERENTIATED CONNECTIVE TISSUE DISEASE (HCC): Primary | ICD-10-CM

## 2019-08-15 DIAGNOSIS — M25.50 HYPERMOBILITY ARTHRALGIA: ICD-10-CM

## 2019-08-15 PROCEDURE — 4004F PT TOBACCO SCREEN RCVD TLK: CPT | Performed by: FAMILY MEDICINE

## 2019-08-15 PROCEDURE — G8420 CALC BMI NORM PARAMETERS: HCPCS | Performed by: FAMILY MEDICINE

## 2019-08-15 PROCEDURE — 99214 OFFICE O/P EST MOD 30 MIN: CPT | Performed by: FAMILY MEDICINE

## 2019-08-15 PROCEDURE — G8427 DOCREV CUR MEDS BY ELIG CLIN: HCPCS | Performed by: FAMILY MEDICINE

## 2019-08-15 RX ORDER — OXYCODONE AND ACETAMINOPHEN 10; 325 MG/1; MG/1
1 TABLET ORAL
Qty: 90 TABLET | Refills: 0 | Status: SHIPPED | OUTPATIENT
Start: 2019-08-17 | End: 2019-08-27 | Stop reason: SDUPTHER

## 2019-08-15 RX ORDER — CITALOPRAM 40 MG/1
40 TABLET ORAL DAILY
COMMUNITY
End: 2020-01-10 | Stop reason: ALTCHOICE

## 2019-08-15 NOTE — PROGRESS NOTES
Socioeconomic History    Marital status: Single     Spouse name: Not on file    Number of children: Not on file    Years of education: Not on file    Highest education level: Not on file   Occupational History    Not on file   Social Needs    Financial resource strain: Not on file    Food insecurity:     Worry: Not on file     Inability: Not on file    Transportation needs:     Medical: Not on file     Non-medical: Not on file   Tobacco Use    Smoking status: Current Every Day Smoker     Packs/day: 0.15    Smokeless tobacco: Never Used   Substance and Sexual Activity    Alcohol use: No    Drug use: No    Sexual activity: Not on file   Lifestyle    Physical activity:     Days per week: Not on file     Minutes per session: Not on file    Stress: Not on file   Relationships    Social connections:     Talks on phone: Not on file     Gets together: Not on file     Attends Jewish service: Not on file     Active member of club or organization: Not on file     Attends meetings of clubs or organizations: Not on file     Relationship status: Not on file    Intimate partner violence:     Fear of current or ex partner: Not on file     Emotionally abused: Not on file     Physically abused: Not on file     Forced sexual activity: Not on file   Other Topics Concern    Not on file   Social History Narrative    Not on file       O: /75 (Site: Left Upper Arm, Position: Sitting, Cuff Size: Medium Adult)   Pulse 99   Ht 5' 10\" (1.778 m)   Wt 134 lb 9.6 oz (61.1 kg)   SpO2 98%   BMI 19.31 kg/m²   Physical Exam  GEN: No acute distress,cooperative, well nourished, alert. HEENT: PEERLA, EOMI , normocephalic/atraumatic, nares and oropharynx clear. Mucus membranes normal, Tympanic membranes clear bilaterally. Neck: soft, supple, no thyromegaly,mass, no Lymphadenopathy  CV: Regular rate and rhythm, no murmur, rubs, gallops. No edema.   Resp: Clear to auscultation bilaterally good air entry bilaterally

## 2019-08-19 DIAGNOSIS — F41.1 GENERALIZED ANXIETY DISORDER: ICD-10-CM

## 2019-08-19 DIAGNOSIS — M79.18 MYOFASCIAL PAIN: ICD-10-CM

## 2019-08-19 RX ORDER — DULOXETIN HYDROCHLORIDE 30 MG/1
CAPSULE, DELAYED RELEASE ORAL
Qty: 30 CAPSULE | Refills: 0 | Status: SHIPPED | OUTPATIENT
Start: 2019-08-19 | End: 2019-09-22 | Stop reason: SDUPTHER

## 2019-08-19 RX ORDER — DULOXETIN HYDROCHLORIDE 60 MG/1
CAPSULE, DELAYED RELEASE ORAL
Qty: 30 CAPSULE | Refills: 0 | Status: SHIPPED | OUTPATIENT
Start: 2019-08-19 | End: 2019-09-22 | Stop reason: SDUPTHER

## 2019-08-23 DIAGNOSIS — M25.50 HYPERMOBILITY ARTHRALGIA: ICD-10-CM

## 2019-08-23 DIAGNOSIS — M79.18 MYOFASCIAL PAIN: ICD-10-CM

## 2019-08-23 DIAGNOSIS — M35.9 UNDIFFERENTIATED CONNECTIVE TISSUE DISEASE (HCC): ICD-10-CM

## 2019-08-23 RX ORDER — OXYCODONE AND ACETAMINOPHEN 10; 325 MG/1; MG/1
1 TABLET ORAL
Qty: 90 TABLET | Refills: 0 | Status: CANCELLED | OUTPATIENT
Start: 2019-08-23 | End: 2019-09-22

## 2019-08-27 ENCOUNTER — OFFICE VISIT (OUTPATIENT)
Dept: FAMILY MEDICINE CLINIC | Age: 29
End: 2019-08-27
Payer: MEDICAID

## 2019-08-27 VITALS
OXYGEN SATURATION: 97 % | SYSTOLIC BLOOD PRESSURE: 123 MMHG | RESPIRATION RATE: 16 BRPM | BODY MASS INDEX: 19.69 KG/M2 | TEMPERATURE: 96.8 F | WEIGHT: 137.2 LBS | HEART RATE: 89 BPM | DIASTOLIC BLOOD PRESSURE: 83 MMHG

## 2019-08-27 DIAGNOSIS — M35.9 UNDIFFERENTIATED CONNECTIVE TISSUE DISEASE (HCC): Primary | ICD-10-CM

## 2019-08-27 DIAGNOSIS — M79.18 MYOFASCIAL PAIN: ICD-10-CM

## 2019-08-27 DIAGNOSIS — M35.9 UNDIFFERENTIATED CONNECTIVE TISSUE DISEASE (HCC): ICD-10-CM

## 2019-08-27 DIAGNOSIS — M25.50 HYPERMOBILITY ARTHRALGIA: ICD-10-CM

## 2019-08-27 DIAGNOSIS — J01.00 ACUTE NON-RECURRENT MAXILLARY SINUSITIS: ICD-10-CM

## 2019-08-27 PROCEDURE — G8420 CALC BMI NORM PARAMETERS: HCPCS | Performed by: FAMILY MEDICINE

## 2019-08-27 PROCEDURE — 4004F PT TOBACCO SCREEN RCVD TLK: CPT | Performed by: FAMILY MEDICINE

## 2019-08-27 PROCEDURE — G8427 DOCREV CUR MEDS BY ELIG CLIN: HCPCS | Performed by: FAMILY MEDICINE

## 2019-08-27 PROCEDURE — 99214 OFFICE O/P EST MOD 30 MIN: CPT | Performed by: FAMILY MEDICINE

## 2019-08-27 RX ORDER — OXYCODONE AND ACETAMINOPHEN 10; 325 MG/1; MG/1
1 TABLET ORAL EVERY 8 HOURS PRN
Qty: 90 TABLET | Refills: 0 | Status: SHIPPED | OUTPATIENT
Start: 2019-08-27 | End: 2019-08-27 | Stop reason: SDUPTHER

## 2019-08-27 RX ORDER — OXYCODONE AND ACETAMINOPHEN 10; 325 MG/1; MG/1
1 TABLET ORAL EVERY 8 HOURS PRN
Qty: 90 TABLET | Refills: 0 | Status: SHIPPED | OUTPATIENT
Start: 2019-08-27 | End: 2019-08-28 | Stop reason: SDUPTHER

## 2019-08-27 RX ORDER — AZITHROMYCIN 250 MG/1
250 TABLET, FILM COATED ORAL SEE ADMIN INSTRUCTIONS
Qty: 6 TABLET | Refills: 0 | Status: SHIPPED | OUTPATIENT
Start: 2019-08-27 | End: 2019-09-01

## 2019-08-27 NOTE — TELEPHONE ENCOUNTER
Warren Silveira called stating [facility] will not fill her Percocet  mg and she would like her script to be sent to the [facility]'s in [place]. She would like to know if this can be done as soon as possible because she is out of medication. Medication name: Percocet  Medication dose:  mg  Frequency: Take 1 tablet by mouth every 8 hours as needed for Pain for up to 30 days.  Intended supply: 30 days  Quantity: 90 tabs  Pharmacy name:[facility] [address] [place] [address]. [facility] Al[facility] [address] [place] [address] [phone]    Last ov: today 8/27/2019

## 2019-08-27 NOTE — PROGRESS NOTES
12/05/2017       Past Medical History:   Diagnosis Date    Seasonal allergies      Past Surgical History:   Procedure Laterality Date    TONSILLECTOMY  2007     Family History   Problem Relation Age of Onset    Other Mother         mixed tissue disorder     Social History     Socioeconomic History    Marital status: Single     Spouse name: Not on file    Number of children: Not on file    Years of education: Not on file    Highest education level: Not on file   Occupational History    Not on file   Social Needs    Financial resource strain: Not on file    Food insecurity:     Worry: Not on file     Inability: Not on file    Transportation needs:     Medical: Not on file     Non-medical: Not on file   Tobacco Use    Smoking status: Current Every Day Smoker     Packs/day: 0.15    Smokeless tobacco: Never Used   Substance and Sexual Activity    Alcohol use: No    Drug use: No    Sexual activity: Not on file   Lifestyle    Physical activity:     Days per week: Not on file     Minutes per session: Not on file    Stress: Not on file   Relationships    Social connections:     Talks on phone: Not on file     Gets together: Not on file     Attends Congregational service: Not on file     Active member of club or organization: Not on file     Attends meetings of clubs or organizations: Not on file     Relationship status: Not on file    Intimate partner violence:     Fear of current or ex partner: Not on file     Emotionally abused: Not on file     Physically abused: Not on file     Forced sexual activity: Not on file   Other Topics Concern    Not on file   Social History Narrative    Not on file       O: /83   Pulse 89   Temp 96.8 °F (36 °C) (Oral)   Resp 16   Wt 137 lb 3.2 oz (62.2 kg)   LMP 08/04/2019   SpO2 97%   BMI 19.69 kg/m²   Physical Exam  GEN: No acute distress,cooperative, well nourished, alert. HEENT: PEERLA, EOMI , normocephalic/atraumatic, nares and oropharynx clear.   Mucus membranes normal, Tympanic membranes clear bilaterally. Neck: soft, supple, no thyromegaly,mass, no Lymphadenopathy  CV: Regular rate and rhythm, no murmur, rubs, gallops. No edema. Resp: Clear to auscultation bilaterally good air entry bilaterally  No crackles, wheeze. Breathing comfortably. Psych: anxious affect. Neuro: AOx3      ASSESSMENT   Diagnosis Orders   1. Undifferentiated connective tissue disease (HCC)  DISCONTINUED: oxyCODONE-acetaminophen (PERCOCET)  MG per tablet   2. Myofascial pain  DISCONTINUED: oxyCODONE-acetaminophen (PERCOCET)  MG per tablet   3. Hypermobility arthralgia  DISCONTINUED: oxyCODONE-acetaminophen (PERCOCET)  MG per tablet   4. Acute non-recurrent maxillary sinusitis  azithromycin (ZITHROMAX) 250 MG tablet     #1-3  25 min appt today spent reviewing the medication agreement and that I could have dismissed the patient due to overuse of the medication. I will give patient this one chance only before dismissing in the future and referring to a pain clinic. She informed me that she did worse on medication assistance therapy. The medication agreement was amended today to reflect the quantity of 90 tablets of Percocet 10/325 q30 days. The risks, benefits, potential side effects and barriers to medication use were addressed today. Understanding was acknowledged. Patient asked to follow-up if condition(s) do not improve as anticipated. #4: The risks, benefits, potential side effects and barriers to medication use were addressed today. Understanding was acknowledged. Patient asked to follow-up if condition(s) do not improve as anticipated. PLAN          If applicable, see additional patient information and instructions under \"Patient Instructions. \"    Return in about 4 weeks (around 9/24/2019). There are no Patient Instructions on file for this visit. Please note a portion of this chart was generated using dragon dictation software.  Although

## 2019-08-28 DIAGNOSIS — M79.18 MYOFASCIAL PAIN: ICD-10-CM

## 2019-08-28 DIAGNOSIS — M35.9 UNDIFFERENTIATED CONNECTIVE TISSUE DISEASE (HCC): ICD-10-CM

## 2019-08-28 DIAGNOSIS — M25.50 HYPERMOBILITY ARTHRALGIA: ICD-10-CM

## 2019-08-28 DIAGNOSIS — F11.93 WITHDRAWAL FROM OPIOIDS (HCC): Primary | ICD-10-CM

## 2019-08-28 RX ORDER — OXYCODONE AND ACETAMINOPHEN 10; 325 MG/1; MG/1
1 TABLET ORAL EVERY 8 HOURS PRN
Qty: 15 TABLET | Refills: 0 | Status: SHIPPED | OUTPATIENT
Start: 2019-08-28 | End: 2019-08-28

## 2019-08-28 RX ORDER — CLONIDINE HYDROCHLORIDE 0.1 MG/1
0.1 TABLET ORAL
Qty: 60 TABLET | Refills: 0 | Status: SHIPPED | OUTPATIENT
Start: 2019-08-28 | End: 2020-01-10 | Stop reason: ALTCHOICE

## 2019-08-28 NOTE — TELEPHONE ENCOUNTER
Pt aware. She will call [facility] and see if they will fill the 15 tablets. She will update office of Clonidine is needed. She is unsure what the [employer] website is for, but wrote it down just in case.

## 2019-08-28 NOTE — TELEPHONE ENCOUNTER
Pt has since called twice, and is requesting update ASAP. She states she is out of medication. Pt Contact # [phone].

## 2019-08-28 NOTE — TELEPHONE ENCOUNTER
Patient understood at clinic appt today that we could face resistance about the early fill. I will e-prescribe to [address] This time instead of 90 tabs, it will be for a small quantity of 15 tablets. If [address] will not fill it then patient will need to update this clinic and I will prescribe the clonidine tablets to help with the withdrawal.    Lastly, a resource that could be helpful is   [employer]. Send me update.

## 2019-08-28 NOTE — TELEPHONE ENCOUNTER
Pt called back, confirms [facility] will NOT fill the 15 tablets due to [facility]. Pt is requesting to have the Clonidine filled instead.      Pharmacy: [address], [place] [id number] . [phone]

## 2019-08-29 ENCOUNTER — TELEPHONE (OUTPATIENT)
Dept: FAMILY MEDICINE CLINIC | Age: 29
End: 2019-08-29

## 2019-08-29 DIAGNOSIS — M79.18 MYOFASCIAL PAIN: ICD-10-CM

## 2019-08-29 DIAGNOSIS — M25.50 HYPERMOBILITY ARTHRALGIA: ICD-10-CM

## 2019-08-29 DIAGNOSIS — M35.9 UNDIFFERENTIATED CONNECTIVE TISSUE DISEASE (HCC): Primary | ICD-10-CM

## 2019-08-29 RX ORDER — OXYCODONE AND ACETAMINOPHEN 10; 325 MG/1; MG/1
1 TABLET ORAL EVERY 8 HOURS PRN
Qty: 90 TABLET | Refills: 0 | COMMUNITY
Start: 2019-08-29 | End: 2020-01-10 | Stop reason: ALTCHOICE

## 2019-09-07 ENCOUNTER — HOSPITAL ENCOUNTER (EMERGENCY)
Age: 29
Discharge: HOME | End: 2019-09-07
Attending: EMERGENCY MEDICINE
Payer: MEDICAID

## 2019-09-07 VITALS
DIASTOLIC BLOOD PRESSURE: 65 MMHG | RESPIRATION RATE: 18 BRPM | HEART RATE: 101 BPM | HEIGHT: 69 IN | WEIGHT: 130 LBS | OXYGEN SATURATION: 95 % | BODY MASS INDEX: 19.26 KG/M2 | TEMPERATURE: 98.8 F | SYSTOLIC BLOOD PRESSURE: 105 MMHG

## 2019-09-07 DIAGNOSIS — M79.18 MYOFASCIAL PAIN: ICD-10-CM

## 2019-09-07 DIAGNOSIS — K08.89 PAIN, DENTAL: Primary | ICD-10-CM

## 2019-09-07 LAB
AMPHETAMINE SCREEN, URINE: ABNORMAL
BARBITURATE SCREEN URINE: ABNORMAL
BENZODIAZEPINE SCREEN, URINE: POSITIVE
BILIRUBIN URINE: ABNORMAL
BLOOD, URINE: NEGATIVE
CANNABINOID SCREEN URINE: POSITIVE
CLARITY: CLEAR
COCAINE METABOLITE SCREEN URINE: POSITIVE
COLOR: ABNORMAL
EPITHELIAL CELLS, UA: 4 /HPF (ref 0–5)
GLUCOSE URINE: NEGATIVE MG/DL
HCG(URINE) PREGNANCY TEST: NEGATIVE
HYALINE CASTS: 3 /LPF (ref 0–8)
KETONES, URINE: ABNORMAL MG/DL
LEUKOCYTE ESTERASE, URINE: ABNORMAL
Lab: ABNORMAL
METHADONE SCREEN, URINE: ABNORMAL
MICROSCOPIC EXAMINATION: YES
NITRITE, URINE: NEGATIVE
OPIATE SCREEN URINE: ABNORMAL
OXYCODONE URINE: ABNORMAL
PH UA: 7
PH UA: 7 (ref 5–8)
PHENCYCLIDINE SCREEN URINE: ABNORMAL
PROPOXYPHENE SCREEN: ABNORMAL
PROTEIN UA: ABNORMAL MG/DL
RBC UA: 1 /HPF (ref 0–4)
SPECIFIC GRAVITY UA: 1.02 (ref 1–1.03)
URINE TYPE: ABNORMAL
UROBILINOGEN, URINE: 1 E.U./DL
WBC UA: 2 /HPF (ref 0–5)

## 2019-09-07 PROCEDURE — 80307 DRUG TEST PRSMV CHEM ANLYZR: CPT

## 2019-09-07 PROCEDURE — 81001 URINALYSIS AUTO W/SCOPE: CPT

## 2019-09-07 PROCEDURE — 84703 CHORIONIC GONADOTROPIN ASSAY: CPT

## 2019-09-07 PROCEDURE — 99283 EMERGENCY DEPT VISIT LOW MDM: CPT

## 2019-09-07 RX ORDER — AZITHROMYCIN 250 MG/1
TABLET, FILM COATED ORAL
Qty: 1 PACKET | Refills: 0 | Status: SHIPPED | OUTPATIENT
Start: 2019-09-07 | End: 2019-09-17

## 2019-09-07 NOTE — LETTER
[facility] Emergency Department      [address] [place] [address], [address]            PROOF OF PRESENCE      To Whom It May Concern:    Precious Palmer was present in the Emergency Department at [facility] on Saturday, 9/7/2019.                                      Sincerely,        Dr. Shi Powell, RN

## 2019-09-09 RX ORDER — IBUPROFEN 800 MG/1
800 TABLET ORAL EVERY 8 HOURS PRN
Qty: 60 TABLET | Refills: 0 | Status: SHIPPED | OUTPATIENT
Start: 2019-09-09 | End: 2019-10-31 | Stop reason: SDUPTHER

## 2019-09-22 DIAGNOSIS — M79.18 MYOFASCIAL PAIN: ICD-10-CM

## 2019-09-22 DIAGNOSIS — F41.1 GENERALIZED ANXIETY DISORDER: ICD-10-CM

## 2019-09-23 RX ORDER — DULOXETIN HYDROCHLORIDE 60 MG/1
CAPSULE, DELAYED RELEASE ORAL
Qty: 30 CAPSULE | Refills: 2 | Status: SHIPPED | OUTPATIENT
Start: 2019-09-23 | End: 2020-01-10 | Stop reason: ALTCHOICE

## 2019-09-23 RX ORDER — DULOXETIN HYDROCHLORIDE 30 MG/1
CAPSULE, DELAYED RELEASE ORAL
Qty: 30 CAPSULE | Refills: 2 | Status: SHIPPED | OUTPATIENT
Start: 2019-09-23 | End: 2020-01-10 | Stop reason: ALTCHOICE

## 2019-10-31 DIAGNOSIS — M79.18 MYOFASCIAL PAIN: ICD-10-CM

## 2019-10-31 RX ORDER — IBUPROFEN 800 MG/1
800 TABLET ORAL EVERY 8 HOURS PRN
Qty: 60 TABLET | Refills: 0 | Status: SHIPPED | OUTPATIENT
Start: 2019-10-31 | End: 2020-07-14 | Stop reason: SDUPTHER

## 2020-01-10 ENCOUNTER — OFFICE VISIT (OUTPATIENT)
Dept: FAMILY MEDICINE CLINIC | Age: 30
End: 2020-01-10
Payer: MEDICAID

## 2020-01-10 VITALS
RESPIRATION RATE: 16 BRPM | BODY MASS INDEX: 20.75 KG/M2 | DIASTOLIC BLOOD PRESSURE: 81 MMHG | HEART RATE: 100 BPM | SYSTOLIC BLOOD PRESSURE: 117 MMHG | OXYGEN SATURATION: 99 % | TEMPERATURE: 98.3 F | WEIGHT: 140.5 LBS

## 2020-01-10 PROCEDURE — G8484 FLU IMMUNIZE NO ADMIN: HCPCS | Performed by: FAMILY MEDICINE

## 2020-01-10 PROCEDURE — G8420 CALC BMI NORM PARAMETERS: HCPCS | Performed by: FAMILY MEDICINE

## 2020-01-10 PROCEDURE — G8427 DOCREV CUR MEDS BY ELIG CLIN: HCPCS | Performed by: FAMILY MEDICINE

## 2020-01-10 PROCEDURE — 4004F PT TOBACCO SCREEN RCVD TLK: CPT | Performed by: FAMILY MEDICINE

## 2020-01-10 PROCEDURE — 99214 OFFICE O/P EST MOD 30 MIN: CPT | Performed by: FAMILY MEDICINE

## 2020-01-10 RX ORDER — LORATADINE 10 MG/1
10 TABLET ORAL DAILY
COMMUNITY
End: 2020-03-17 | Stop reason: SDUPTHER

## 2020-01-10 RX ORDER — TRAZODONE HYDROCHLORIDE 100 MG/1
100 TABLET ORAL NIGHTLY
Qty: 30 TABLET | Refills: 2 | Status: SHIPPED | OUTPATIENT
Start: 2020-01-10 | End: 2020-04-22

## 2020-01-10 RX ORDER — HYDROXYCHLOROQUINE SULFATE 200 MG/1
200 TABLET, FILM COATED ORAL DAILY
Qty: 30 TABLET | Refills: 2 | Status: SHIPPED | OUTPATIENT
Start: 2020-01-10 | End: 2020-02-25

## 2020-01-10 RX ORDER — CYCLOBENZAPRINE HCL 5 MG
5 TABLET ORAL 3 TIMES DAILY PRN
COMMUNITY
End: 2020-04-13

## 2020-01-10 RX ORDER — BUSPIRONE HYDROCHLORIDE 7.5 MG/1
7.5 TABLET ORAL 3 TIMES DAILY
COMMUNITY
End: 2020-01-10

## 2020-01-10 RX ORDER — NORGESTIMATE AND ETHINYL ESTRADIOL 0.25-0.035
1 KIT ORAL DAILY
COMMUNITY
End: 2020-02-25

## 2020-01-10 RX ORDER — DESVENLAFAXINE 50 MG/1
50 TABLET, EXTENDED RELEASE ORAL DAILY
COMMUNITY
End: 2020-03-24 | Stop reason: SDUPTHER

## 2020-01-10 RX ORDER — HYDROXYZINE 50 MG/1
50 TABLET, FILM COATED ORAL
COMMUNITY
End: 2020-02-17 | Stop reason: SDUPTHER

## 2020-01-10 RX ORDER — TRAZODONE HYDROCHLORIDE 100 MG/1
100 TABLET ORAL NIGHTLY
COMMUNITY
End: 2020-01-10 | Stop reason: SDUPTHER

## 2020-01-10 RX ORDER — VITAMIN E 268 MG
400 CAPSULE ORAL DAILY
COMMUNITY
End: 2020-07-21 | Stop reason: SDUPTHER

## 2020-01-10 RX ORDER — BUSPIRONE HYDROCHLORIDE 10 MG/1
10 TABLET ORAL 3 TIMES DAILY
Qty: 90 TABLET | Refills: 2 | Status: SHIPPED | OUTPATIENT
Start: 2020-01-10 | End: 2020-04-28

## 2020-01-10 NOTE — PATIENT INSTRUCTIONS
1) Buspar is now 10 mg to take 3x/day. 2) Let the pharmacy know or contact your PCP if any other maintenance medications are running low. 3) Continue DBT with your current therapist  4) Begin finding a psychiatrist or other prescriber in network. 5) Find a podcast or 2 that you find useful.

## 2020-01-10 NOTE — PROGRESS NOTES
[facility]  Progress Note  Mathieu Shultz DO          Roula Velasquez  1990 01/13/20    Chief Complaint:   Roula Velasquez is a 34 y.o. female who is here for return to this clinic after inpatient detox program        HPI:   [place] [address]. She is initially accompanied by her mother in April strong whose mother provides privacy after 3 minutes of reintroduction. Patient tells me she was diagnosed with borderline disorder. Medications were changed and she was weaned off benzodiazepines and opioids. Seeing a DBT therapist.  Back a weeek now. She is reintegrating into family as her fiancé is taking care of the infant along with her mother and mother-in-law      ROS negative for headache, visionchanges, chest pain, shortness of breath, abdominal pain, urinary sx, bowel changes. Past medical, surgical, and social history reviewed. and allergies reviewed. Allergies   Allergen Reactions    Cyclobenzaprine      \"knocks me out\"    Meloxicam Other (See Comments)     Pt states it just knocked her out      Prior to Visit Medications    Medication Sig Taking?  Authorizing Provider   norgestimate-ethinyl estradiol (SHARMILA) 0.25-35 MG-MCG per tablet Take 1 tablet by mouth daily Yes Historical Provider, MD   hydrOXYzine (ATARAX) 50 MG tablet Take 50 mg by mouth Take 2 tablets 4x daily Yes Historical Provider, MD   desvenlafaxine (KHEDEZLA) 50 MG TB24 extended release tablet Take 50 mg by mouth daily Yes Historical Provider, MD   cyclobenzaprine (FLEXERIL) 5 MG tablet Take 5 mg by mouth 3 times daily as needed for Muscle spasms Yes Historical Provider, MD   loratadine (CLARITIN) 10 MG tablet Take 10 mg by mouth daily Yes Historical Provider, MD   vitamin E 400 UNIT capsule Take 400 Units by mouth daily Yes Historical Provider, MD   Multiple Vitamin (MULTI-VITAMIN DAILY PO) Take 1 tablet by mouth daily Yes Historical Provider, MD   traZODone (DESYREL) 100 MG tablet Take 1 tablet by mouth nightly Yes Jessica Deshpande DO   busPIRone (BUSPAR) 10 MG tablet Take 1 tablet by mouth 3 times daily Yes Jessica Deshpande DO   hydroxychloroquine (PLAQUENIL) 200 MG tablet Take 1 tablet by mouth daily Yes Ankit Vargas DO   ibuprofen (ADVIL;MOTRIN) 800 MG tablet TAKE 1 TABLET BY MOUTH EVERY 8 HOURS AS NEEDED FOR PAIN Yes Mark Vargas DO   QUETIAPINE FUMARATE ER PO Take 100 mg by mouth Take 1 tablet in the AM and take 1 1/2 tablet in the PM Yes Historical Provider, MD   minocycline (MINOCIN;DYNACIN) 75 MG capsule TAKE 1 CAPSULE BY MOUTH TWICE DAILY  Patient taking differently: TAKE 1-2 CAPSULE BY MOUTH DAILY Yes Ankit Vargas DO   norgestimate-ethinyl estradiol (ESTARYLLA) 0.25-35 MG-MCG per tablet Take 1 tablet by mouth daily  Historical Provider, MD          Vitals:    01/10/20 1247   BP: 117/81   Pulse: 100   Resp: 16   Temp: 98.3 °F (36.8 °C)   TempSrc: Oral   SpO2: 99%   Weight: 140 lb 8 oz (63.7 kg)      Wt Readings from Last 3 Encounters:   01/10/20 140 lb 8 oz (63.7 kg)   09/07/19 130 lb (59 kg)   08/27/19 137 lb 3.2 oz (62.2 kg)     BP Readings from Last 3 Encounters:   01/10/20 117/81   09/07/19 105/65   08/27/19 123/83       Patient Active Problem List   Diagnosis    Mood disorder ([facility] [address]    Acne    Myofascial pain    Neck swelling    History of sinus tachycardia    Tobacco use    Generalized anxiety disorder    Cigarette nicotine dependence with nicotine-induced disorder    Hypermobility arthralgia    Undifferentiated connective tissue disease ([facility] [address]       Immunization History   Administered Date(s) Administered    Influenza, Quadv, IM, (6 mo and older Fluzone, Flulaval, Fluarix and 3 yrs and older Afluria) 12/05/2017       Past Medical History:   Diagnosis Date    Seasonal allergies      Past Surgical History:   Procedure Laterality Date    TONSILLECTOMY  2007     Family rhythm, no murmur, rubs, gallops. No edema. Resp: Clear to auscultation bilaterally good air entry bilaterally  No crackles, wheeze. Breathing comfortably. Psych:normal affect. Denies SI/HI. Neuro: AOx3          ASSESSMENT   Diagnosis Orders   1. Mood disorder (HCC)  desvenlafaxine (KHEDEZLA) 50 MG TB24 extended release tablet    traZODone (DESYREL) 100 MG tablet    busPIRone (BUSPAR) 10 MG tablet   2. Borderline personality disorder in adult [facility])     3. Generalized anxiety disorder  hydrOXYzine (ATARAX) 50 MG tablet   4. Hypermobility arthralgia  hydroxychloroquine (PLAQUENIL) 200 MG tablet     40-minute appointment today with discussion of reintegration into her family along with time spent looking at mental health agencies for her covered by her insurance plan. I printed out the [facility] plan agencies in network. She has this and she will start calling in the meantime we will bridge any of these prescriptions. Patient needs to be off opioids and benzodiazepines due to the nature of #2 and her past addiction. PLAN          If applicable, see additional patient information and instructions under \"Patient Instructions. \"    Return in about 1 month (around 2/10/2020). Patient Instructions   1) Buspar is now 10 mg to take 3x/day. 2) Let the pharmacy know or contact your PCP if any other maintenance medications are running low. 3) Continue DBT with your current therapist  4) Begin finding a psychiatrist or other prescriber in network. 5) Find a podcast or 2 that you find useful. Please note a portion of this chart was generated using dragon dictation software. Although every effort was made to ensure the accuracy of this automated transcription,some errors in transcription may have occurred.

## 2020-01-17 ENCOUNTER — TELEPHONE (OUTPATIENT)
Dept: FAMILY MEDICINE CLINIC | Age: 30
End: 2020-01-17

## 2020-02-06 RX ORDER — QUETIAPINE FUMARATE 100 MG/1
100 TABLET, FILM COATED ORAL 2 TIMES DAILY
Qty: 60 TABLET | Refills: 2 | Status: SHIPPED | OUTPATIENT
Start: 2020-02-06 | End: 2020-02-25

## 2020-02-11 NOTE — TELEPHONE ENCOUNTER
Chema Munroe called stating [facility] does not have her script for quetiapine 100 mg. The script was sent electronically on 2/6/2020. She would like to know if it can be sent again today. The message is being routed high priority since pt has been waiting on her prescription. Please advise. Thanks.         [facility] [address] [place] [address], [address]. Daysi Norris [facility] [phone]    Please follow up with Chema Munroe [phone] (home)

## 2020-02-17 ENCOUNTER — TELEPHONE (OUTPATIENT)
Dept: FAMILY MEDICINE CLINIC | Age: 30
End: 2020-02-17

## 2020-02-17 RX ORDER — HYDROXYZINE 50 MG/1
50 TABLET, FILM COATED ORAL 4 TIMES DAILY
Qty: 240 TABLET | Refills: 0 | Status: SHIPPED | OUTPATIENT
Start: 2020-02-17 | End: 2020-03-18

## 2020-02-25 ENCOUNTER — OFFICE VISIT (OUTPATIENT)
Dept: FAMILY MEDICINE CLINIC | Age: 30
End: 2020-02-25
Payer: MEDICAID

## 2020-02-25 VITALS
BODY MASS INDEX: 22.22 KG/M2 | DIASTOLIC BLOOD PRESSURE: 89 MMHG | SYSTOLIC BLOOD PRESSURE: 127 MMHG | HEART RATE: 88 BPM | TEMPERATURE: 98.7 F | WEIGHT: 150.5 LBS | RESPIRATION RATE: 16 BRPM | OXYGEN SATURATION: 100 %

## 2020-02-25 PROCEDURE — G8427 DOCREV CUR MEDS BY ELIG CLIN: HCPCS | Performed by: FAMILY MEDICINE

## 2020-02-25 PROCEDURE — G8484 FLU IMMUNIZE NO ADMIN: HCPCS | Performed by: FAMILY MEDICINE

## 2020-02-25 PROCEDURE — 4004F PT TOBACCO SCREEN RCVD TLK: CPT | Performed by: FAMILY MEDICINE

## 2020-02-25 PROCEDURE — 99214 OFFICE O/P EST MOD 30 MIN: CPT | Performed by: FAMILY MEDICINE

## 2020-02-25 PROCEDURE — G8420 CALC BMI NORM PARAMETERS: HCPCS | Performed by: FAMILY MEDICINE

## 2020-02-25 RX ORDER — NORGESTIMATE AND ETHINYL ESTRADIOL 7DAYSX3 LO
1 KIT ORAL DAILY
Qty: 28 TABLET | Refills: 5 | Status: SHIPPED | OUTPATIENT
Start: 2020-02-25 | End: 2020-08-12

## 2020-02-25 RX ORDER — QUETIAPINE FUMARATE 50 MG/1
50 TABLET, FILM COATED ORAL 2 TIMES DAILY
Qty: 60 TABLET | Refills: 5 | Status: SHIPPED | OUTPATIENT
Start: 2020-02-25 | End: 2021-01-14

## 2020-02-25 NOTE — PATIENT INSTRUCTIONS
1) At the lower dose of seroquel, the hope is that the weight will stabilze. 2) Pharmacy has Loree Ahumada prescription. 3) Follow-up in 3 to 4 weeks. 4) Get labwork done. [address], Intake/Walk in hours are Monday through Friday between 8:00 am & 12:00 pm. 2nd Floor. [address], [address] Office Location [phone]  · Bring in photo Id, proof of health coverage if any, and proof of income. · Kathi Thompson [address] [facility], [address]  · [address] [facility], [address]  · [address] [place], [address]  · [address] [facility] [place] 184 [facility], [address]  · 7800 [facility], [address]  · [address] [facility] 71 [facility], [address]     Mobile Crisis, Emergency Psychiatric Clinic for patients in need of medication and or a Psychiatrist, temporarily. [facility], [address]. ([facility]). [phone]. [address] [facility], [address] [phone]. · Case Management  · Mental Health Prevention  · Substance Abuse Therapy  · Housing Assistance  · Benigno Wells, Kandice [place], & [facility]. [facility], Clinical counseling and assessment are provided to assist individuals. · Individual/Group Therapy   · Couples/Family Therapy  · Psychological Testing  · Case Management [facility])  · Psychiatric Medication Services  You must call [phone]. You must have Medicaid. [address] [place], [address]. Mental Health Access Point, These services are for individuals who are non-insured. [address] [place] [address] Drive, [place], [address] [place] [address]. [phone].       [facility],   These services are for individuals who feel comfortable with meetings online as well as chat

## 2020-02-25 NOTE — PROGRESS NOTES
[facility]  Progress Note  Dereck JeffersonDO Jackie sandoval  1990 02/25/20    Chief Complaint:   Jackie Yen is a 34 y.o. female who is here for depression and bipolar        HPI:   Patient accompanied by her significant other Dawood Farnsworth and the main concern from patient today is the weight gain. She takes the Seroquel 100 mg twice a day for the bipolar disorder. She does incorporate exercise and fitness. She does have concerned about her self-image and tells me she would rather look sick and be 100 pounds even than her current weight. That she is having swollen glands in her axilla or groin. Family does have a cat but no cat scratches. Was dispensed a different type of birth control pill than what she was use to. Dispenssed Damaris Lopez. She prefers PT [place] [facility] [place] [facility] [place]. She has a talk therapist that provides dialectical behavioral therapy 2 times a week. She still has not made appointment to establish with a local psychiatrist    ROS negative for headache, visionchanges, chest pain, shortness of breath, abdominal pain, urinary sx, bowel changes. Past medical, surgical, and social history reviewed. and allergies reviewed. Allergies   Allergen Reactions    Cyclobenzaprine      \"knocks me out\"    Meloxicam Other (See Comments)     Pt states it just knocked her out      Prior to Visit Medications    Medication Sig Taking?  Authorizing Provider   QUEtiapine (SEROQUEL) 50 MG tablet Take 1 tablet by mouth 2 times daily Yes Caitlyn Vargas,    Norgestim-Eth Estrad Triphasic (TRINESSA LO) 0.18/0.215/0.25 MG-25 MCG TABS Take 1 each by mouth daily Yes Caitlyn Vargas DO   hydrOXYzine (ATARAX) 50 MG tablet Take 1 tablet by mouth 4 times daily Yes Caitlyn Vargas,    desvenlafaxine (KHEDEZLA) 50 MG TB24 extended release tablet Take 50 mg by mouth daily Yes Historical Provider, MD   cyclobenzaprine (FLEXERIL) 5 MG tablet Take 5 mg by mouth 3 times daily as needed Socioeconomic History    Marital status: Single     Spouse name: Not on file    Number of children: Not on file    Years of education: Not on file    Highest education level: Not on file   Occupational History    Not on file   Social Needs    Financial resource strain: Not on file    Food insecurity:     Worry: Not on file     Inability: Not on file    Transportation needs:     Medical: Not on file     Non-medical: Not on file   Tobacco Use    Smoking status: Current Every Day Smoker     Packs/day: 0.15    Smokeless tobacco: Never Used   Substance and Sexual Activity    Alcohol use: No    Drug use: No    Sexual activity: Not on file   Lifestyle    Physical activity:     Days per week: Not on file     Minutes per session: Not on file    Stress: Not on file   Relationships    Social connections:     Talks on phone: Not on file     Gets together: Not on file     Attends Sikhism service: Not on file     Active member of club or organization: Not on file     Attends meetings of clubs or organizations: Not on file     Relationship status: Not on file    Intimate partner violence:     Fear of current or ex partner: Not on file     Emotionally abused: Not on file     Physically abused: Not on file     Forced sexual activity: Not on file   Other Topics Concern    Not on file   Social History Narrative    Not on file       O: /89   Pulse 88   Temp 98.7 °F (37.1 °C) (Oral)   Resp 16   Wt 150 lb 8 oz (68.3 kg)   LMP 02/11/2020 (Approximate)   SpO2 100%   Breastfeeding No   BMI 22.22 kg/m²   Physical Exam  GEN: No acute distress,cooperative, well nourished, alert. HEENT: PEERLA, EOMI , normocephalic/atraumatic, nares and oropharynx clear. Mucus membranes normal, Tympanic membranes clear bilaterally. Neck: soft, supple, no thyromegaly,mass, no Lymphadenopathy  CV: Regular rate and rhythm, no murmur, rubs, gallops. No edema.   Resp: Clear to auscultation bilaterally good air entry portion of this chart was generated using dragon dictation software. Although every effort was made to ensure the accuracy of this automated transcription,some errors in transcription may have occurred.

## 2020-03-17 RX ORDER — LORATADINE 10 MG/1
10 TABLET ORAL DAILY
Qty: 30 TABLET | Refills: 2 | Status: SHIPPED | OUTPATIENT
Start: 2020-03-17 | End: 2020-06-08 | Stop reason: SDUPTHER

## 2020-03-17 RX ORDER — MULTIVITAMIN
1 TABLET ORAL DAILY
Qty: 30 TABLET | Refills: 2 | Status: SHIPPED | OUTPATIENT
Start: 2020-03-17 | End: 2020-06-17

## 2020-03-24 ENCOUNTER — TELEPHONE (OUTPATIENT)
Dept: FAMILY MEDICINE CLINIC | Age: 30
End: 2020-03-24

## 2020-03-24 DIAGNOSIS — F39 MOOD DISORDER (HCC): ICD-10-CM

## 2020-03-24 RX ORDER — DESVENLAFAXINE 50 MG/1
50 TABLET, EXTENDED RELEASE ORAL DAILY
Qty: 30 TABLET | Refills: 5 | Status: SHIPPED | OUTPATIENT
Start: 2020-03-24 | End: 2020-09-19

## 2020-04-10 ENCOUNTER — TELEPHONE (OUTPATIENT)
Dept: FAMILY MEDICINE CLINIC | Age: 30
End: 2020-04-10

## 2020-04-13 RX ORDER — CYCLOBENZAPRINE HCL 5 MG
5 TABLET ORAL 3 TIMES DAILY PRN
Qty: 30 TABLET | Refills: 2 | Status: SHIPPED | OUTPATIENT
Start: 2020-04-13 | End: 2020-05-27

## 2020-04-13 RX ORDER — HYDROXYZINE 50 MG/1
50 TABLET, FILM COATED ORAL EVERY 8 HOURS PRN
Qty: 90 TABLET | Refills: 2 | Status: SHIPPED | OUTPATIENT
Start: 2020-04-13 | End: 2020-06-08 | Stop reason: SDUPTHER

## 2020-04-13 RX ORDER — CYCLOBENZAPRINE HCL 5 MG
5 TABLET ORAL 3 TIMES DAILY PRN
Status: CANCELLED | OUTPATIENT
Start: 2020-04-13

## 2020-04-22 RX ORDER — TRAZODONE HYDROCHLORIDE 100 MG/1
TABLET ORAL
Qty: 30 TABLET | Refills: 2 | Status: SHIPPED | OUTPATIENT
Start: 2020-04-22 | End: 2020-06-08 | Stop reason: SDUPTHER

## 2020-04-28 RX ORDER — BUSPIRONE HYDROCHLORIDE 10 MG/1
TABLET ORAL
Qty: 90 TABLET | Refills: 2 | Status: SHIPPED | OUTPATIENT
Start: 2020-04-28 | End: 2020-07-27

## 2020-05-27 RX ORDER — CYCLOBENZAPRINE HCL 5 MG
TABLET ORAL
Qty: 30 TABLET | Refills: 0 | Status: SHIPPED | OUTPATIENT
Start: 2020-05-27 | End: 2020-06-08 | Stop reason: SDUPTHER

## 2020-05-27 NOTE — TELEPHONE ENCOUNTER
30 tabs with 0 RF approved (instead of 2 RF requested). Past due for follow-up on mood disorder. Call her to see if has established with psychiatrist yet; if not set up f/u VV with me for anytime in June.

## 2020-06-08 ENCOUNTER — VIRTUAL VISIT (OUTPATIENT)
Dept: FAMILY MEDICINE CLINIC | Age: 30
End: 2020-06-08
Payer: MEDICAID

## 2020-06-08 PROCEDURE — 99213 OFFICE O/P EST LOW 20 MIN: CPT | Performed by: FAMILY MEDICINE

## 2020-06-08 PROCEDURE — 4004F PT TOBACCO SCREEN RCVD TLK: CPT | Performed by: FAMILY MEDICINE

## 2020-06-08 PROCEDURE — G8420 CALC BMI NORM PARAMETERS: HCPCS | Performed by: FAMILY MEDICINE

## 2020-06-08 PROCEDURE — G8427 DOCREV CUR MEDS BY ELIG CLIN: HCPCS | Performed by: FAMILY MEDICINE

## 2020-06-08 RX ORDER — CYCLOBENZAPRINE HCL 5 MG
TABLET ORAL
Qty: 90 TABLET | Refills: 2 | Status: SHIPPED | OUTPATIENT
Start: 2020-06-08 | End: 2020-07-08

## 2020-06-08 RX ORDER — TRAZODONE HYDROCHLORIDE 100 MG/1
TABLET ORAL
Qty: 30 TABLET | Refills: 2 | Status: SHIPPED | OUTPATIENT
Start: 2020-06-08 | End: 2020-09-19

## 2020-06-08 RX ORDER — HYDROXYZINE 50 MG/1
50 TABLET, FILM COATED ORAL EVERY 8 HOURS PRN
Qty: 90 TABLET | Refills: 2 | Status: SHIPPED | OUTPATIENT
Start: 2020-06-08 | End: 2020-06-18

## 2020-06-08 RX ORDER — LORATADINE 10 MG/1
10 TABLET ORAL DAILY
Qty: 30 TABLET | Refills: 2 | Status: SHIPPED | OUTPATIENT
Start: 2020-06-08 | End: 2020-09-19

## 2020-06-08 ASSESSMENT — PATIENT HEALTH QUESTIONNAIRE - PHQ9
SUM OF ALL RESPONSES TO PHQ QUESTIONS 1-9: 0
SUM OF ALL RESPONSES TO PHQ9 QUESTIONS 1 & 2: 0
2. FEELING DOWN, DEPRESSED OR HOPELESS: 0
SUM OF ALL RESPONSES TO PHQ QUESTIONS 1-9: 0
1. LITTLE INTEREST OR PLEASURE IN DOING THINGS: 0

## 2020-06-08 NOTE — PATIENT INSTRUCTIONS
--You can get your lab work or x-ray at [employer]. --Call when appointment needed. LAB: [address]  Lab hours (7AM - 5PM Monday through Friday; 8AM - noon on Saturdays),   Ph# [phone]    X-RAY: [address]  Radiology hours, (7:00AM - 5PM Monday through Friday only)  Ph# [phone] or [facility]  --Call our office in 1 week if you have not heard about the results. Or check [employer] if you have previously enrolled.

## 2020-06-08 NOTE — PROGRESS NOTES
[facility]  TELEMEDICINE Progress Note  Beckie Hummel DO      The risks and benefits of converting to a virtual visit were discussed in light of the current infectious disease epidemic. Patient also understood that insurance coverage and co-pays are up to their individual insurance plans. Patient identification was verified at the start of the visit. Laurence Poole  1990 06/08/20    Patient location: Home address on file  Provider location: Physician's home    Chief Complaint:   Laurence Poole is a 34 y.o. patient who is here for         HPI:   Doing well overall. Needs meds renewed. Taking care of 1 yo daughter. ROS negative for headache, vision changes, chest pain, shortness of breath, abdominal pain, urinary sx, bowel changes. Past medical, surgical, and social history reviewed. Medications and allergies reviewed. Allergies   Allergen Reactions    Meloxicam Other (See Comments)     Pt states it just knocked her out      Prior to Visit Medications    Medication Sig Taking?  Authorizing Provider   cyclobenzaprine (FLEXERIL) 5 MG tablet TAKE 1 TABLET BY MOUTH THREE TIMES DAILY AS NEEDED FOR MUSCLE SPASMS Yes Yanet Sheets Bingcang, DO   VITAMIN D PO Take by mouth Yes Historical Provider, MD   traZODone (DESYREL) 100 MG tablet TAKE 1 TABLET BY MOUTH EVERY NIGHT Yes Yanet Sheets Bingcang, DO   loratadine (CLARITIN) 10 MG tablet Take 1 tablet by mouth daily Yes Yanet Sheets Bingcang, DO   hydrOXYzine (ATARAX) 50 MG tablet Take 1 tablet by mouth every 8 hours as needed for Itching or Anxiety Yes Yanet Sheets Bingcang, DO   busPIRone (BUSPAR) 10 MG tablet TAKE 1 TABLET BY MOUTH THREE TIMES DAILY Yes Yanet Sheets Bingcang, DO   desvenlafaxine (KHEDEZLA) 50 MG TB24 extended release tablet Take 1 tablet by mouth daily Yes Yanet Sheets Bingcang, DO   Multiple Vitamin (MULTI-VITAMIN DAILY) TABS Take 1 tablet by mouth daily Take 1 tablet by mouth daily Yes SPENT ON CONFERENCIN    Please note a portion of this chart was generated using dragon dictation software. Although every effort was made to ensure the accuracy of this automated transcription, some errors in transcription may have occurred. Pursuant to the emergency declaration under the [facility], [facility] waiver authority and the [employer] Act, this Virtual  Visit was conducted, with patient's consent, to reduce the patient's risk of exposure to COVID-19 and provide continuity of care for an established patient. Services were provided through a video synchronous discussion virtually to substitute for in-person clinic visit. Patient was instructed that the AVS is available on My Chart or was emailed to the patient if not on My Chart. Lab orders were emailed to patient if they do not use a [facility]. Any work notes were sent to patient through My Chart or email.

## 2020-06-10 NOTE — TELEPHONE ENCOUNTER
Patient requesting a medication refill.   Medication: VITAMIN D PO   Pharmacy: [facility] [address] [place] [address] Ryan Ledesma [phone]  Last office visit: 6/8/2020  Next office visit: Visit date not found

## 2020-06-11 RX ORDER — VITAMIN E 268 MG
400 CAPSULE ORAL DAILY
Qty: 30 CAPSULE | Refills: 3 | Status: SHIPPED | OUTPATIENT
Start: 2020-06-11 | End: 2021-02-02 | Stop reason: SDUPTHER

## 2020-06-13 DIAGNOSIS — R59.1 LYMPHADENOPATHY: ICD-10-CM

## 2020-06-13 LAB
A/G RATIO: 1.8 (ref 1.1–2.2)
ALBUMIN SERPL-MCNC: 4.4 G/DL (ref 3.4–5)
ALP BLD-CCNC: 68 U/L (ref 40–129)
ALT SERPL-CCNC: 9 U/L (ref 10–40)
ANION GAP SERPL CALCULATED.3IONS-SCNC: 18 MMOL/L (ref 3–16)
AST SERPL-CCNC: 15 U/L (ref 15–37)
BILIRUB SERPL-MCNC: <0.2 MG/DL (ref 0–1)
BUN BLDV-MCNC: 11 MG/DL (ref 7–20)
CALCIUM SERPL-MCNC: 9.2 MG/DL (ref 8.3–10.6)
CHLORIDE BLD-SCNC: 100 MMOL/L (ref 99–110)
CO2: 20 MMOL/L (ref 21–32)
CREAT SERPL-MCNC: 0.7 MG/DL (ref 0.6–1.1)
GFR AFRICAN AMERICAN: >60
GFR NON-AFRICAN AMERICAN: >60
GLOBULIN: 2.4 G/DL
GLUCOSE BLD-MCNC: 164 MG/DL (ref 70–99)
HCT VFR BLD CALC: 38.2 % (ref 36–48)
HEMOGLOBIN: 12.4 G/DL (ref 12–16)
MCH RBC QN AUTO: 26.6 PG (ref 26–34)
MCHC RBC AUTO-ENTMCNC: 32.5 G/DL (ref 31–36)
MCV RBC AUTO: 81.9 FL (ref 80–100)
PDW BLD-RTO: 17.6 % (ref 12.4–15.4)
PLATELET # BLD: 287 K/UL (ref 135–450)
PMV BLD AUTO: 7.8 FL (ref 5–10.5)
POTASSIUM SERPL-SCNC: 4.2 MMOL/L (ref 3.5–5.1)
RBC # BLD: 4.67 M/UL (ref 4–5.2)
SODIUM BLD-SCNC: 138 MMOL/L (ref 136–145)
T4 FREE: 1 NG/DL (ref 0.9–1.8)
TOTAL PROTEIN: 6.8 G/DL (ref 6.4–8.2)
TSH REFLEX: 20.74 UIU/ML (ref 0.27–4.2)
WBC # BLD: 4.3 K/UL (ref 4–11)

## 2020-06-17 RX ORDER — MULTIVITAMIN WITH FOLIC ACID 400 MCG
TABLET ORAL
Qty: 30 TABLET | Refills: 2 | Status: SHIPPED | OUTPATIENT
Start: 2020-06-17 | End: 2020-09-19

## 2020-06-22 RX ORDER — HYDROXYZINE 50 MG/1
50 TABLET, FILM COATED ORAL EVERY 8 HOURS PRN
Qty: 90 TABLET | Refills: 2 | Status: SHIPPED | OUTPATIENT
Start: 2020-06-22 | End: 2020-10-24

## 2020-07-13 ENCOUNTER — TELEPHONE (OUTPATIENT)
Dept: FAMILY MEDICINE CLINIC | Age: 30
End: 2020-07-13

## 2020-07-13 NOTE — TELEPHONE ENCOUNTER
Medication name:ibuprofen (ADVIL;MOTRIN) 800 MG tablet       Medication dose:  Frequency:TAKE 1 TABLET BY MOUTH EVERY 8 HOURS AS NEEDED FOR PAIN  Quantity:60 tablet  Pharmacy name:[facility] number:  Last OV:6/8/20  Last Labs:  Medication name:Vitamin d 400 Iu  Medication dose:  Frequency:take once a day  Quantity:30  [facility] [address]    Pharmacy number:  Last OV:  Last Labs:

## 2020-07-14 RX ORDER — IBUPROFEN 800 MG/1
800 TABLET ORAL EVERY 8 HOURS PRN
Qty: 60 TABLET | Refills: 0 | Status: SHIPPED | OUTPATIENT
Start: 2020-07-14 | End: 2020-08-12

## 2020-07-22 RX ORDER — VITAMIN E 268 MG
400 CAPSULE ORAL DAILY
Qty: 30 CAPSULE | Refills: 1 | Status: SHIPPED | OUTPATIENT
Start: 2020-07-22 | End: 2020-11-25

## 2020-07-28 RX ORDER — BUSPIRONE HYDROCHLORIDE 10 MG/1
TABLET ORAL
Qty: 90 TABLET | Refills: 1 | Status: SHIPPED | OUTPATIENT
Start: 2020-07-28 | End: 2020-12-28 | Stop reason: SDUPTHER

## 2020-08-12 RX ORDER — IBUPROFEN 800 MG/1
800 TABLET ORAL EVERY 8 HOURS PRN
Qty: 60 TABLET | Refills: 0 | Status: SHIPPED | OUTPATIENT
Start: 2020-08-12 | End: 2020-08-20 | Stop reason: SDUPTHER

## 2020-08-12 RX ORDER — NORGESTIMATE AND ETHINYL ESTRADIOL
KIT
Qty: 28 TABLET | Refills: 5 | Status: SHIPPED | OUTPATIENT
Start: 2020-08-12 | End: 2021-01-14

## 2020-08-20 RX ORDER — IBUPROFEN 800 MG/1
800 TABLET ORAL EVERY 8 HOURS PRN
Qty: 60 TABLET | Refills: 0 | Status: SHIPPED | OUTPATIENT
Start: 2020-08-20 | End: 2020-09-19

## 2020-09-19 RX ORDER — MULTIVITAMIN WITH FOLIC ACID 400 MCG
TABLET ORAL
Qty: 30 TABLET | Refills: 2 | Status: SHIPPED | OUTPATIENT
Start: 2020-09-19 | End: 2020-12-28 | Stop reason: SDUPTHER

## 2020-09-19 RX ORDER — TRAZODONE HYDROCHLORIDE 100 MG/1
TABLET ORAL
Qty: 30 TABLET | Refills: 2 | Status: SHIPPED | OUTPATIENT
Start: 2020-09-19 | End: 2020-12-28 | Stop reason: SDUPTHER

## 2020-09-19 RX ORDER — DESVENLAFAXINE 50 MG/1
TABLET, EXTENDED RELEASE ORAL
Qty: 30 TABLET | Refills: 2 | Status: SHIPPED | OUTPATIENT
Start: 2020-09-19 | End: 2020-12-28 | Stop reason: SDUPTHER

## 2020-09-19 RX ORDER — IBUPROFEN 800 MG/1
800 TABLET ORAL EVERY 8 HOURS PRN
Qty: 60 TABLET | Refills: 0 | Status: SHIPPED | OUTPATIENT
Start: 2020-09-19 | End: 2020-10-05

## 2020-09-19 RX ORDER — CYCLOBENZAPRINE HCL 5 MG
TABLET ORAL
Qty: 90 TABLET | Refills: 2 | Status: SHIPPED | OUTPATIENT
Start: 2020-09-19 | End: 2021-01-14 | Stop reason: SDUPTHER

## 2020-09-19 RX ORDER — LORATADINE 10 MG/1
10 TABLET ORAL DAILY
Qty: 30 TABLET | Refills: 2 | Status: SHIPPED | OUTPATIENT
Start: 2020-09-19 | End: 2020-12-28 | Stop reason: SDUPTHER

## 2020-10-05 RX ORDER — IBUPROFEN 800 MG/1
800 TABLET ORAL EVERY 8 HOURS PRN
Qty: 60 TABLET | Refills: 0 | Status: SHIPPED | OUTPATIENT
Start: 2020-10-05 | End: 2020-10-24

## 2020-10-19 ENCOUNTER — TELEPHONE (OUTPATIENT)
Dept: FAMILY MEDICINE CLINIC | Age: 30
End: 2020-10-19

## 2020-10-19 NOTE — TELEPHONE ENCOUNTER
----- Message from Carlos A Samson sent at 10/19/2020 10:28 AM EDT -----  Subject: Message to Provider    QUESTIONS  Information for Provider? Patient would like to schedule for a flu shot   when they become available again.   ---------------------------------------------------------------------------  --------------  CALL BACK INFO  What is the best way for the office to contact you? OK to leave message on   voicemail  Preferred Call Back Phone Number? [phone]  ---------------------------------------------------------------------------  --------------  SCRIPT ANSWERS  Relationship to Patient?  Self

## 2020-10-20 NOTE — TELEPHONE ENCOUNTER
Spoke with patient and advised we are waiting on flu shots to be delivered. Advised patient to call every couple of days to see if they have been received.

## 2020-10-22 NOTE — TELEPHONE ENCOUNTER
Requested Prescriptions     Pending Prescriptions Disp Refills    ibuprofen (ADVIL;MOTRIN) 800 MG tablet [Pharmacy Med Name: IBUPROFEN 800MG TABLETS] 60 tablet 0     Sig: TAKE 1 TABLET BY MOUTH EVERY 8 HOURS AS NEEDED FOR PAIN     'Harika Valdez

## 2020-10-24 RX ORDER — IBUPROFEN 800 MG/1
800 TABLET ORAL EVERY 8 HOURS PRN
Qty: 60 TABLET | Refills: 0 | Status: SHIPPED | OUTPATIENT
Start: 2020-10-24 | End: 2020-11-25

## 2020-10-24 RX ORDER — HYDROXYZINE 50 MG/1
50 TABLET, FILM COATED ORAL EVERY 8 HOURS PRN
Qty: 90 TABLET | Refills: 2 | Status: SHIPPED | OUTPATIENT
Start: 2020-10-24 | End: 2021-06-18 | Stop reason: SDUPTHER

## 2020-11-26 RX ORDER — VITAMIN E 268 MG
400 CAPSULE ORAL DAILY
Qty: 30 CAPSULE | Refills: 5 | Status: SHIPPED | OUTPATIENT
Start: 2020-11-26 | End: 2021-01-14

## 2020-11-26 RX ORDER — IBUPROFEN 800 MG/1
800 TABLET ORAL EVERY 8 HOURS PRN
Qty: 60 TABLET | Refills: 2 | Status: SHIPPED | OUTPATIENT
Start: 2020-11-26 | End: 2021-02-14

## 2020-12-28 DIAGNOSIS — F39 MOOD DISORDER (HCC): ICD-10-CM

## 2020-12-28 DIAGNOSIS — J30.1 ALLERGIC RHINITIS DUE TO POLLEN, UNSPECIFIED SEASONALITY: ICD-10-CM

## 2020-12-28 RX ORDER — MULTIVITAMIN WITH FOLIC ACID 400 MCG
TABLET ORAL
Qty: 30 TABLET | Refills: 5 | Status: SHIPPED | OUTPATIENT
Start: 2020-12-28 | End: 2021-01-14 | Stop reason: SDUPTHER

## 2020-12-28 RX ORDER — TRAZODONE HYDROCHLORIDE 100 MG/1
100 TABLET ORAL NIGHTLY
Qty: 90 TABLET | Refills: 1 | Status: SHIPPED | OUTPATIENT
Start: 2020-12-28 | End: 2021-01-14 | Stop reason: SDUPTHER

## 2020-12-28 RX ORDER — DESVENLAFAXINE 50 MG/1
TABLET, EXTENDED RELEASE ORAL
Qty: 30 TABLET | Refills: 5 | Status: SHIPPED | OUTPATIENT
Start: 2020-12-28 | End: 2021-06-21 | Stop reason: SDUPTHER

## 2020-12-28 RX ORDER — LORATADINE 10 MG/1
10 TABLET ORAL DAILY
Qty: 30 TABLET | Refills: 5 | Status: SHIPPED | OUTPATIENT
Start: 2020-12-28 | End: 2021-01-14 | Stop reason: SDUPTHER

## 2020-12-28 RX ORDER — BUSPIRONE HYDROCHLORIDE 10 MG/1
TABLET ORAL
Qty: 90 TABLET | Refills: 1 | Status: SHIPPED | OUTPATIENT
Start: 2020-12-28 | End: 2021-01-14 | Stop reason: SDUPTHER

## 2020-12-28 NOTE — TELEPHONE ENCOUNTER
----- Message from Noreen Dancer sent at 12/28/2020 11:09 AM EST -----  Subject: Refill Request    QUESTIONS  Name of Medication? desvenlafaxine succinate (PRISTIQ) 50 MG TB24 extended   release tablet  Patient-reported dosage and instructions? one tablet daily   How many days do you have left? 0  [id number]  Pharmacy phone number (if available)? [phone]  ---------------------------------------------------------------------------  --------------    Name of Medication? loratadine (CLARITIN) 10 MG tablet  Patient-reported dosage and instructions? one daily  How many days do you have left? 0  [facility]? [place] [address] #[id number]  Pharmacy phone number (if available)? [phone]  ---------------------------------------------------------------------------  --------------    Name of Medication? Multiple Vitamin (DAILY-YOKASTA) TABS  Patient-reported dosage and instructions? one a day  How many days do you have left? 0  [facility]? [place] [address] #[id number]  Pharmacy phone number (if available)? [phone]  ---------------------------------------------------------------------------  --------------    Name of Medication? traZODone (DESYREL) 100 MG tablet  Patient-reported dosage and instructions? one a day  How many days do you have left? 15  [facility]? [address] [facility] #[id number]  Pharmacy phone number (if available)? [phone]  ---------------------------------------------------------------------------  --------------    Name of Medication? busPIRone (BUSPAR) 10 MG tablet  Patient-reported dosage and instructions? three times a day  How many days do you have left? 15  [facility]? [facility] #[id number]  Pharmacy phone number (if available)? [phone]  ---------------------------------------------------------------------------  --------------  Deb Castillo INFO  What is the best way for the office to contact you?  OK to leave message on   voicemail  Preferred Call Back Phone Number?  [phone]

## 2021-01-14 ENCOUNTER — OFFICE VISIT (OUTPATIENT)
Dept: FAMILY MEDICINE CLINIC | Age: 31
End: 2021-01-14
Payer: MEDICAID

## 2021-01-14 VITALS
BODY MASS INDEX: 24.84 KG/M2 | DIASTOLIC BLOOD PRESSURE: 87 MMHG | WEIGHT: 168.2 LBS | OXYGEN SATURATION: 98 % | SYSTOLIC BLOOD PRESSURE: 110 MMHG | TEMPERATURE: 98.4 F | RESPIRATION RATE: 16 BRPM | HEART RATE: 111 BPM

## 2021-01-14 DIAGNOSIS — R11.0 CHRONIC NAUSEA: Primary | ICD-10-CM

## 2021-01-14 DIAGNOSIS — R53.83 OTHER FATIGUE: ICD-10-CM

## 2021-01-14 DIAGNOSIS — F39 MOOD DISORDER (HCC): ICD-10-CM

## 2021-01-14 DIAGNOSIS — J30.1 ALLERGIC RHINITIS DUE TO POLLEN, UNSPECIFIED SEASONALITY: ICD-10-CM

## 2021-01-14 DIAGNOSIS — J34.89 SINUS PRESSURE: ICD-10-CM

## 2021-01-14 PROCEDURE — G8484 FLU IMMUNIZE NO ADMIN: HCPCS | Performed by: FAMILY MEDICINE

## 2021-01-14 PROCEDURE — G8427 DOCREV CUR MEDS BY ELIG CLIN: HCPCS | Performed by: FAMILY MEDICINE

## 2021-01-14 PROCEDURE — 4004F PT TOBACCO SCREEN RCVD TLK: CPT | Performed by: FAMILY MEDICINE

## 2021-01-14 PROCEDURE — G8420 CALC BMI NORM PARAMETERS: HCPCS | Performed by: FAMILY MEDICINE

## 2021-01-14 PROCEDURE — 99214 OFFICE O/P EST MOD 30 MIN: CPT | Performed by: FAMILY MEDICINE

## 2021-01-14 RX ORDER — LORATADINE 10 MG/1
10 TABLET ORAL DAILY
Qty: 30 TABLET | Refills: 5 | Status: SHIPPED | OUTPATIENT
Start: 2021-01-14 | End: 2021-12-28

## 2021-01-14 RX ORDER — ONDANSETRON 4 MG/1
4 TABLET, ORALLY DISINTEGRATING ORAL EVERY 8 HOURS PRN
Qty: 12 TABLET | Refills: 0 | Status: SHIPPED | OUTPATIENT
Start: 2021-01-14 | End: 2021-10-29

## 2021-01-14 RX ORDER — HYDROXYZINE PAMOATE 25 MG/1
25 CAPSULE ORAL 3 TIMES DAILY PRN
Qty: 90 CAPSULE | Refills: 1 | Status: SHIPPED | OUTPATIENT
Start: 2021-01-14 | End: 2021-01-28

## 2021-01-14 RX ORDER — TRAZODONE HYDROCHLORIDE 100 MG/1
100 TABLET ORAL NIGHTLY
Qty: 90 TABLET | Refills: 1 | Status: SHIPPED | OUTPATIENT
Start: 2021-01-14 | End: 2021-12-29

## 2021-01-14 RX ORDER — PSEUDOEPHEDRINE HYDROCHLORIDE 30 MG/1
30 TABLET ORAL EVERY 12 HOURS PRN
Qty: 18 TABLET | Refills: 0 | Status: SHIPPED | OUTPATIENT
Start: 2021-01-14 | End: 2022-01-14

## 2021-01-14 RX ORDER — MULTIVITAMIN WITH FOLIC ACID 400 MCG
TABLET ORAL
Qty: 30 TABLET | Refills: 5 | Status: SHIPPED | OUTPATIENT
Start: 2021-01-14 | End: 2022-01-03

## 2021-01-14 RX ORDER — CYCLOBENZAPRINE HCL 5 MG
TABLET ORAL
Qty: 90 TABLET | Refills: 2 | Status: SHIPPED | OUTPATIENT
Start: 2021-01-14 | End: 2021-06-29

## 2021-01-14 RX ORDER — AZITHROMYCIN 250 MG/1
250 TABLET, FILM COATED ORAL SEE ADMIN INSTRUCTIONS
Qty: 6 TABLET | Refills: 0 | Status: SHIPPED | OUTPATIENT
Start: 2021-01-14 | End: 2021-01-19

## 2021-01-14 RX ORDER — BUSPIRONE HYDROCHLORIDE 10 MG/1
10 TABLET ORAL DAILY
Qty: 90 TABLET | Refills: 1 | Status: SHIPPED | OUTPATIENT
Start: 2021-01-14 | End: 2022-02-01

## 2021-01-14 ASSESSMENT — PATIENT HEALTH QUESTIONNAIRE - PHQ9
1. LITTLE INTEREST OR PLEASURE IN DOING THINGS: 0
SUM OF ALL RESPONSES TO PHQ QUESTIONS 1-9: 0
SUM OF ALL RESPONSES TO PHQ QUESTIONS 1-9: 0
2. FEELING DOWN, DEPRESSED OR HOPELESS: 0
SUM OF ALL RESPONSES TO PHQ9 QUESTIONS 1 & 2: 0

## 2021-01-14 NOTE — PATIENT INSTRUCTIONS
NO APPOINTMENT NECESSARY  WE ACCEPT ALL MAJOR INSURANCE PLANS  WE ACCEPT SELF PAYING PATIENTS  CALL [phone] FOR MORE INFORMATION    [facility]  [address], [address] [facility], [address] [place] [address]  Phone: [phone] Fax: [phone]  Mon.-Fri. 7 a.m.-5 p.m. Sat. 8 a.m.-Noon    [facility]  [address] [facility]  Videm douglas Ptuju, Rodolfo Lilia  Phone: [phone] and [phone]  Fax: [phone]  Mon.-Fri. 7:30 a.m.-4 p.m. [address], Niraj Montano [address]  Phone: [phone]  Mon.-Fri. 7:30 a.m.-4 p.m. [facility] [address]  [place], [address] [place] [address] [address]  Phone [phone]  Fax: [phone]  Mon-Fri 8 a.m.-5:30 p.m. 723 [facility]  [address] [facility], 1171 [facility]  Phone: [phone]  Fax [phone]  Mon-Fri 7:30 a.m - 4 p.m. [facility]  [address] [facility], [address], [address] [place] [address]  Phone: [phone]  Mon., Tue., Thu., Fri. 7:30 a.m.-5 p.m.  Emma Mitchell. 7:30 a.m.-Noon    [facility]  [address] [facility] [address] [facility] [address]  [place], 1501 [facility]  Phone: [phone] Fax: [phone]  Mon.-Fri. 7:30 a.m.-4 p.m. [address] [facility]  [address], 914 [facility], 2550 [facility]  Phone: [phone] Fax: [phone]  Mon.-Fri. 8 a.m.-4:30 p.m. [address] [facility], [facility] [facility]  Phone: [phone]  Mon.-Fri. 7:30 a.m.-4 p.m. [address] [facility]  1139 [facility]  Phone: [phone] Fax: [phone]  Mon.-Fri. 7 a.m.-5 p.m. Sat. 8 a.m.-Noon [facility] [place], [address] [place] [address]  Phone: [phone] Fax: [phone]  Mon.-Fri. 7 a.m.-5 p.m.     [facility]  [address] [facility], 400 [facility]  Phone: [phone] Fax: [phone]  Mon.-Fri. 7 a.m.-5 p.m. [address], [place], 2501 [facility]  Phone: [phone]  Mon.-Fri. 6:30 a.m.-6 p.m. Sat. 7 a.m.-1 p.m. [facility], [place], [facility]  Phone: [phone]  Open 24/7    [facility]  [address], [facility], [address]  Phone: [phone]  Mon.-Fri. 6 a.m.-7 p.m. Sat. 7 a.m.-3 p.m. [facility]  [address] [facility]  Phone: [phone]  Mon.-Fri. 6 a.m.-11 p.m.  Sat.-Sun. 6:30 a.m.-11 p.m. [address] and [address]  Phone: [phone]  Mon.-Fri. 9 a.m.- 1 p.m. [address] [place] [address]. [address]  Phone: [phone]  Open 24/7    [facility] [address], [place] [address]  Phone: [phone]  Mon.-Fri. 6:30 a.m.-6:30 p.m. Sat. 8 a.m.-Noon    [address] [facility], [place] [address]  Phone: [phone] Fax: [phone]  Mon.-Fri. 8:30 a.m.-5 p.m. [facility]  [address] [facility], [address]  Phone: [phone] Fax: [phone]  Mon.-Fri. 8 a.m.-4:30 p.m. [address], [facility]  Phone: [phone]  Fax: [phone]  Mon-Fri 7:30 am 4 p.m. Please call ahead to check hours.

## 2021-01-14 NOTE — LETTER
[address] [place] [address],[facility] 100  Phone: [phone]  Fax: [address],         January 14, 2021     Patient: China Godoy   YOB: 1990   Date of Visit: 1/14/2021       To Whom It May Concern: It is my medical opinion that China Godoy was seen at the [address] [place], [place] [address]y 64, 2021. Please excuse her absence earlier this week due to illness. If you have any questions or concerns, please don't hesitate to call.     Sincerely,        Emanuel Sanchez DO

## 2021-01-14 NOTE — PROGRESS NOTES
[facility]  Progress Note  Gretchen Arce, [address]          Tiffanie Mayen  1990 01/17/21    Chief Complaint:   Tiffanie Mayen is a 27 y.o. female who is here for         HPI:     oset 5 days. nausea, ear pain, body aches, ST, cough w/ yellow phlegm, sinus pressure. Covid test yesterday. (waiting on results)  Requests refill request on her maintenance medication. Otherwise doing fine. Accompanied by her significant other Aurora Hobby. ROS negative for headache, visionchanges, chest pain, shortness of breath, abdominal pain, urinary sx, bowel changes. Past medical, surgical, and social history reviewed. and allergies reviewed. Allergies   Allergen Reactions    Meloxicam Other (See Comments)     Pt states it just knocked her out      Prior to Visit Medications    Medication Sig Taking?  Authorizing Provider   ondansetron (ZOFRAN ODT) 4 MG disintegrating tablet Take 1 tablet by mouth every 8 hours as needed for Nausea or Vomiting Yes Bal Saavedra,    vitamin D (CHOLECALCIFEROL) 25 MCG (1000 UT) TABS tablet Take 1 tablet by mouth daily Yes Arjun Vargas DO   loratadine (CLARITIN) 10 MG tablet Take 1 tablet by mouth daily Yes Arjun Vargas DO   Multiple Vitamin (DAILY-YOKASTA) TABS TAKE 1 TABLET BY MOUTH EVERY DAY Yes Bal Saavedra DO   traZODone (DESYREL) 100 MG tablet Take 1 tablet by mouth nightly Yes Bal Saavedra,    busPIRone (BUSPAR) 10 MG tablet Take 1 tablet by mouth daily Yes Arjun Vargas DO   hydrOXYzine (VISTARIL) 25 MG capsule Take 1 capsule by mouth 3 times daily as needed for Itching or Anxiety Yes Mark Vargas DO   cyclobenzaprine (FLEXERIL) 5 MG tablet TAKE 1 TABLET BY MOUTH THREE TIMES DAILY AS NEEDED FOR MUSCLE SPASMS Yes Arjun Vargas DO   azithromycin (ZITHROMAX) 250 MG tablet Take 1 tablet by mouth See Admin Instructions for 5 days 500mg on day 1 followed by 250mg on days 2 - 5 Yes Lindsey Vargas,    pseudoephedrine (DECONGESTANT) 30 MG tablet Take 1 tablet by mouth every 12 hours as needed for Congestion Yes Lindsey Vargas DO   desvenlafaxine succinate (PRISTIQ) 50 MG TB24 extended release tablet TAKE 1 TABLET BY MOUTH DAILY Yes Mark Mathew Vargas, DO   ibuprofen (ADVIL;MOTRIN) 800 MG tablet TAKE 1 TABLET BY MOUTH EVERY 8 HOURS AS NEEDED FOR PAIN Yes Lindsey Vargas DO   vitamin E 400 UNIT capsule Take 1 capsule by mouth daily Yes Lindsey Vargas DO   VITAMIN D PO Take by mouth Yes Historical Provider, MD   norgestimate-ethinyl estradiol (ESTARYLLA) 0.25-35 MG-MCG per tablet Take 1 tablet by mouth daily Yes Historical Provider, MD          Vitals:    01/14/21 1103   BP: 110/87   Pulse: 111   Resp: 16   Temp: 98.4 °F (36.9 °C)   TempSrc: Tympanic   SpO2: 98%   Weight: 168 lb 3.2 oz (76.3 kg)      Wt Readings from Last 3 Encounters:   01/14/21 168 lb 3.2 oz (76.3 kg)   02/25/20 150 lb 8 oz (68.3 kg)   01/10/20 140 lb 8 oz (63.7 kg)     BP Readings from Last 3 Encounters:   01/14/21 110/87   02/25/20 127/89   01/10/20 117/81       Patient Active Problem List   Diagnosis    Mood disorder ([facility]    Acne    Myofascial pain    Neck swelling    History of sinus tachycardia    Tobacco use    Generalized anxiety disorder    Cigarette nicotine dependence with nicotine-induced disorder    Hypermobility arthralgia    Undifferentiated connective tissue disease ([facility]       Immunization History   Administered Date(s) Administered    Influenza, Quadv, IM, (6 mo and older Fluzone, Flulaval, Fluarix and 3 yrs and older Afluria) 12/05/2017       Past Medical History:   Diagnosis Date    Seasonal allergies      Past Surgical History:   Procedure Laterality Date    TONSILLECTOMY  2007     Family History   Problem Relation Age of Onset    Other Mother         mixed tissue disorder     Social History     Socioeconomic History    Marital status: Single     Spouse name: Not on file    Number of children: Not on file    Years of education: Not on file    Highest education level: Not on file   Occupational History    Not on file   Social Needs    Financial resource strain: Not on file    Food insecurity     Worry: Not on file     Inability: Not on file    Transportation needs     Medical: Not on file     Non-medical: Not on file   Tobacco Use    Smoking status: Current Every Day Smoker     Packs/day: 0.15    Smokeless tobacco: Never Used   Substance and Sexual Activity    Alcohol use: No    Drug use: No    Sexual activity: Not on file   Lifestyle    Physical activity     Days per week: Not on file     Minutes per session: Not on file    Stress: Not on file   Relationships    Social connections     Talks on phone: Not on file     Gets together: Not on file     Attends Taoist service: Not on file     Active member of club or organization: Not on file     Attends meetings of clubs or organizations: Not on file     Relationship status: Not on file    Intimate partner violence     Fear of current or ex partner: Not on file     Emotionally abused: Not on file     Physically abused: Not on file     Forced sexual activity: Not on file   Other Topics Concern    Not on file   Social History Narrative    Not on file       O: /87   Pulse 111   Temp 98.4 °F (36.9 °C) (Tympanic)   Resp 16   Wt 168 lb 3.2 oz (76.3 kg)   LMP 01/07/2021 (Approximate)   SpO2 98%   Breastfeeding No   BMI 24.84 kg/m²   Physical Exam  GEN: No acute distress,cooperative, well nourished, alert. HEENT: PEERLA, EOMI , normocephalic/atraumatic, external nose appears normal.  External ear is normal.    Neck: soft, supple, no appreciable thyromegaly,mass  CV: No upper extremity edema. Resp:  Breathing comfortably. Psych:normal affect. Neuro: AOx3  Other Pertinent Physical Exam findings:         ASSESSMENT   Diagnosis Orders   1.  Chronic nausea  ondansetron (ZOFRAN ODT) 4 MG disintegrating tablet   2. Allergic rhinitis due to pollen, unspecified seasonality  loratadine (CLARITIN) 10 MG tablet   3. Mood disorder (HCC)  traZODone (DESYREL) 100 MG tablet    busPIRone (BUSPAR) 10 MG tablet   4. Other fatigue  T4, FREE    TSH with Reflex    CBC WITH AUTO DIFFERENTIAL    COMPREHENSIVE METABOLIC PANEL   5. Sinus pressure  azithromycin (ZITHROMAX) 250 MG tablet     Right symptomatic relief for #1. #1 likely due to #5. #5: I discussed utility of antibiotics, their possible side effects (notably antibiotic-associated diarrhea and candidiasis) and the risk to contribute to antibiotic-resistance strains of bacteria. The patient is to take after symptoms persist for more than 1 week and if experiencing the key symptoms and signs as discussed in the clinic visit. #2-3: The current medical regimen is effective;  continue present plan and medications. #4:  Due for lab work. History of mild hypothyroidism hypothyroidism. #4 likely chronic fatigue secondary to #3        PLAN          If applicable, see additional patient information and instructions under \"Patient Instructions. \"    Return in about 6 months (around 7/14/2021). Patient Instructions   NO APPOINTMENT NECESSARY  WE ACCEPT ALL MAJOR INSURANCE PLANS  WE ACCEPT SELF PAYING PATIENTS  CALL [phone] FOR MORE INFORMATION    [facility]  [address] [place] [address], [address] [place] [address]  Phone: [phone] Fax: [phone]  Mon.-Fri. 7 a.m.-5 p.m. Sat. 8 a.m.[facility]  [address] [facility]  Phone: [phone] and [phone]  Fax: [phone]  Mon.-Fri. 7:30 a.m.-4 p.m. [address] [facility], Niraj [address]ano [address]  Phone: [phone]  Mon.-Fri. 7:30 a.m.-4 p.m.         [facility] [address] [place] [address]. [address]  [place], [address] [facility] [address]  Phone [phone]  Fax: [phone]  Mon-Fri 8 a.m.-5:30 p.m.    [address] [facility]  [address] [facility], [address]  Phone: [phone]  Fax [phone]  Mon-Fri 7:30 a.m - 4 p.m. [facility]  [address], [address] [place] [address], [address]  Phone: [phone]  Mon., Tue., Thu., Fri. 7:30 a.m.-5 p.m.  Krishna Wayneen. 7:30 a.m.-Noon    [facility]  [address] [facility], [address] [place] [address]  Phone: [phone] Fax: [phone]  Mon.-Fri. 7:30 a.m.-4 p.m. [address] [facility]  [facility] [address], [address] [facility], [address] [facility]  Phone: [phone] Fax: [phone]  Mon.-Fri. 8 a.m.-4:30 p.m. [address] [facility], [address]  Phone: [phone]  Mon.-Fri. 7:30 a.m.-4 p.m. [address] [facility]  [address] [facility]  Phone: [phone] Fax: [phone]  Mon.-Fri. 7 a.m.-5 p.m. Sat. 8 a.m.-Noon    [facility] [place], [address] [place] [address]  Phone: [phone] Fax: [phone]  Mon.-Fri. 7 a.m.-5 p.m. [facility]  [address] [facility], 400 [facility]  Phone: [phone] Fax: [phone]  Mon.-Fri. 7 a.m.-5 p.m. [address]  [address]  Phone: [phone]  Mon.-Fri. 6:30 a.m.-6 p.m. Sat. 7 a.m.-1 p.m. [place] [address], [place], [facility]  Phone: [phone]  [facility] 24/7    [address], [address] [place] [address] Drive  Phone: [phone]  Mon.-Fri. 6 a.m.-7 p.m. Sat. 7 a.m.-3 p.m. [facility]  [address] [facility]  Phone: [phone]  Mon.-Fri. 6 a.m.-11 p.m.  Sat.-Sun. 6:30 a.m.-11 p.m. [address] and Tony Braden  [address] [place] [address]  Phone: [phone]  Mon.-Fri. 9 a.m.- 1 p.m.     [address] [place] [address] Partners CEE Thomas, [address] [facility]  Phone: [phone]  Open 24/7    [facility]  [address] Bailon  Phone: [phone]  Mon.-Fri. 6:30 a.m.-6:30 p.m. Sat. 8 a.m.-Noon    [address] [facility] Jamaal Calderon [address]  Phone: [phone] Fax: [phone]  Mon.-Fri. 8:30 a.m.-5 p.m. [facility]  [address] [facility], [address] [place] [address]  Phone: [phone] Fax: [phone]  Mon.-Fri. 8 a.m.-4:30 p.m. [address] [place] [address] [address]  Phone: [phone]  Fax: [phone]  Mon-Fri 7:30 am 4 p.m. Please call ahead to check hours. Please note a portion of this chart was generated using dragon dictation software. Although every effort was made to ensure the accuracy of this automated transcription,some errors in transcription may have occurred.

## 2021-01-15 ENCOUNTER — HOSPITAL ENCOUNTER (OUTPATIENT)
Age: 31
Discharge: HOME | End: 2021-01-15
Payer: MEDICAID

## 2021-01-15 DIAGNOSIS — R73.09 ELEVATED GLUCOSE: ICD-10-CM

## 2021-01-15 DIAGNOSIS — R53.83 OTHER FATIGUE: ICD-10-CM

## 2021-01-15 LAB
A/G RATIO: 1.3 (ref 1.1–2.2)
ALBUMIN SERPL-MCNC: 4.2 G/DL (ref 3.4–5)
ALP BLD-CCNC: 106 U/L (ref 40–129)
ALT SERPL-CCNC: 9 U/L (ref 10–40)
ANION GAP SERPL CALCULATED.3IONS-SCNC: 10 MMOL/L (ref 3–16)
AST SERPL-CCNC: 17 U/L (ref 15–37)
BASOPHILS ABSOLUTE: 0 K/UL (ref 0–0.2)
BASOPHILS RELATIVE PERCENT: 0.8 %
BILIRUB SERPL-MCNC: 0.3 MG/DL (ref 0–1)
BUN BLDV-MCNC: 11 MG/DL (ref 7–20)
CALCIUM SERPL-MCNC: 9.7 MG/DL (ref 8.3–10.6)
CHLORIDE BLD-SCNC: 97 MMOL/L (ref 99–110)
CO2: 26 MMOL/L (ref 21–32)
CREAT SERPL-MCNC: 0.7 MG/DL (ref 0.6–1.1)
EOSINOPHILS ABSOLUTE: 0 K/UL (ref 0–0.6)
EOSINOPHILS RELATIVE PERCENT: 0.3 %
GFR AFRICAN AMERICAN: >60
GFR NON-AFRICAN AMERICAN: >60
GLOBULIN: 3.2 G/DL
GLUCOSE BLD-MCNC: 87 MG/DL (ref 70–99)
HCT VFR BLD CALC: 37.4 % (ref 36–48)
HEMOGLOBIN: 12.4 G/DL (ref 12–16)
LYMPHOCYTES ABSOLUTE: 1.5 K/UL (ref 1–5.1)
LYMPHOCYTES RELATIVE PERCENT: 33 %
MCH RBC QN AUTO: 29.2 PG (ref 26–34)
MCHC RBC AUTO-ENTMCNC: 33.1 G/DL (ref 31–36)
MCV RBC AUTO: 88.3 FL (ref 80–100)
MONOCYTES ABSOLUTE: 0.4 K/UL (ref 0–1.3)
MONOCYTES RELATIVE PERCENT: 9.3 %
NEUTROPHILS ABSOLUTE: 2.5 K/UL (ref 1.7–7.7)
NEUTROPHILS RELATIVE PERCENT: 56.6 %
PDW BLD-RTO: 13.7 % (ref 12.4–15.4)
PLATELET # BLD: 283 K/UL (ref 135–450)
PMV BLD AUTO: 8.6 FL (ref 5–10.5)
POTASSIUM SERPL-SCNC: 5 MMOL/L (ref 3.5–5.1)
RBC # BLD: 4.24 M/UL (ref 4–5.2)
SODIUM BLD-SCNC: 133 MMOL/L (ref 136–145)
T4 FREE: 1 NG/DL (ref 0.9–1.8)
TOTAL PROTEIN: 7.4 G/DL (ref 6.4–8.2)
TSH REFLEX: 6.08 UIU/ML (ref 0.27–4.2)
WBC # BLD: 4.4 K/UL (ref 4–11)

## 2021-01-15 PROCEDURE — 84439 ASSAY OF FREE THYROXINE: CPT

## 2021-01-15 PROCEDURE — 80053 COMPREHEN METABOLIC PANEL: CPT

## 2021-01-15 PROCEDURE — 36415 COLL VENOUS BLD VENIPUNCTURE: CPT

## 2021-01-15 PROCEDURE — 84443 ASSAY THYROID STIM HORMONE: CPT

## 2021-01-15 PROCEDURE — 83036 HEMOGLOBIN GLYCOSYLATED A1C: CPT

## 2021-01-15 PROCEDURE — 85025 COMPLETE CBC W/AUTO DIFF WBC: CPT

## 2021-01-16 LAB
ESTIMATED AVERAGE GLUCOSE: 102.5 MG/DL
HBA1C MFR BLD: 5.2 %

## 2021-02-02 DIAGNOSIS — Z30.019 ENCOUNTER FOR FEMALE BIRTH CONTROL: ICD-10-CM

## 2021-02-02 RX ORDER — NORGESTIMATE AND ETHINYL ESTRADIOL 7DAYSX3 LO
KIT ORAL
Qty: 28 TABLET | Refills: 5 | Status: SHIPPED | OUTPATIENT
Start: 2021-02-02 | End: 2021-07-16

## 2021-02-02 RX ORDER — VITAMIN E 268 MG
400 CAPSULE ORAL DAILY
Qty: 30 CAPSULE | Refills: 5 | Status: SHIPPED | OUTPATIENT
Start: 2021-02-02 | End: 2021-07-29

## 2021-02-02 NOTE — TELEPHONE ENCOUNTER
rX ARE updated  (Vitamin E and Tri lo sprintec). Let her know. Diagnosis Orders   1.  Encounter for female birth control  Norgestim-Eth Estrad Triphasic (TRI-LO-SPRINTEC) 0.18/0.215/0.25 MG-25 MCG TABS

## 2021-02-02 NOTE — TELEPHONE ENCOUNTER
Pt called, stating she needs refill on her birth control and vitamin E. She is on her last birth control pill. Please advise. Thanks.         Medication name:TRI-LO-SPRINTEC 0.18/0.215/0.25 MG-25 MCG TABS [[id number]]  Medication dose:25 MCG  Frequency:TAKE 1 TABLET BY MOUTH DAILY  Quantity:28 tablet        Medication name:vitamin E   Medication dose:400 Units  Frequency:Take 1 capsule by mouth daily  Quantity:30 capsule  Pharmacy name:[facility] [address] [phone] Excell [place] [phone]    Last ov: 1/14/2021  Last labs: 1/15/2021

## 2021-02-12 DIAGNOSIS — M79.18 MYOFASCIAL PAIN: ICD-10-CM

## 2021-02-14 RX ORDER — IBUPROFEN 800 MG/1
800 TABLET ORAL EVERY 8 HOURS PRN
Qty: 60 TABLET | Refills: 2 | Status: SHIPPED | OUTPATIENT
Start: 2021-02-14 | End: 2021-04-30 | Stop reason: SDUPTHER

## 2021-04-30 DIAGNOSIS — M79.18 MYOFASCIAL PAIN: ICD-10-CM

## 2021-04-30 RX ORDER — IBUPROFEN 800 MG/1
800 TABLET ORAL EVERY 8 HOURS PRN
Qty: 60 TABLET | Refills: 2 | Status: SHIPPED | OUTPATIENT
Start: 2021-04-30 | End: 2021-07-06

## 2021-06-03 NOTE — TELEPHONE ENCOUNTER
Requested Prescriptions     Pending Prescriptions Disp Refills    ibuprofen (ADVIL;MOTRIN) 800 MG tablet [Pharmacy Med Name: IBUPROFEN 800MG TABLETS] 60 tablet 0     Sig: TAKE 1 TABLET BY MOUTH EVERY 8 HOURS AS NEEDED FOR PAIN     LOV:6/8/2020  Joel Newman Finasteride Counseling:  I discussed with the patient the risks of use of finasteride including but not limited to decreased libido, decreased ejaculate volume, gynecomastia, and depression. Women should not handle medication.  All of the patient's questions and concerns were addressed. Finasteride Male Counseling: Finasteride Counseling:  I discussed with the patient the risks of use of finasteride including but not limited to decreased libido, decreased ejaculate volume, gynecomastia, and depression. Women should not handle medication.  All of the patient's questions and concerns were addressed.

## 2021-06-08 NOTE — TELEPHONE ENCOUNTER
----- Message from [facility] sent at 6/8/2021  2:03 PM EDT -----  Subject: Refill Request    QUESTIONS  Name of Medication? vitamin D (CHOLECALCIFEROL) 25 MCG (1000 UT) TABS   tablet  Patient-reported dosage and instructions? 1x a day  How many days do you have left? 0  [facility]? [id number]  Pharmacy phone number (if available)? [phone]  Additional Information for Provider? Patient is needing this refilled, she   said this is the only medication she ever has problems getting refilled. Call pt when medicine is called in.   ---------------------------------------------------------------------------  --------------  CALL BACK INFO  What is the best way for the office to contact you? OK to leave message on   voicemail  Preferred Call Back Phone Number?  [phone]

## 2021-06-18 RX ORDER — HYDROXYZINE 50 MG/1
50 TABLET, FILM COATED ORAL EVERY 8 HOURS PRN
Qty: 90 TABLET | Refills: 2 | Status: SHIPPED | OUTPATIENT
Start: 2021-06-18 | End: 2021-06-28

## 2021-06-18 RX ORDER — HYDROXYZINE 50 MG/1
50 TABLET, FILM COATED ORAL EVERY 8 HOURS PRN
Qty: 90 TABLET | Refills: 0 | Status: SHIPPED | OUTPATIENT
Start: 2021-06-18 | End: 2021-12-29

## 2021-06-21 DIAGNOSIS — F39 MOOD DISORDER (HCC): ICD-10-CM

## 2021-06-21 RX ORDER — DESVENLAFAXINE 50 MG/1
TABLET, EXTENDED RELEASE ORAL
Qty: 30 TABLET | Refills: 5 | Status: SHIPPED | OUTPATIENT
Start: 2021-06-21 | End: 2021-12-16

## 2021-06-29 RX ORDER — CYCLOBENZAPRINE HCL 5 MG
TABLET ORAL
Qty: 30 TABLET | Refills: 5 | Status: SHIPPED | OUTPATIENT
Start: 2021-06-29 | End: 2021-09-29 | Stop reason: SDUPTHER

## 2021-07-05 DIAGNOSIS — M79.18 MYOFASCIAL PAIN: ICD-10-CM

## 2021-07-06 RX ORDER — IBUPROFEN 800 MG/1
800 TABLET ORAL EVERY 8 HOURS PRN
Qty: 60 TABLET | Refills: 2 | Status: SHIPPED | OUTPATIENT
Start: 2021-07-06 | End: 2021-10-01

## 2021-07-15 DIAGNOSIS — Z30.019 ENCOUNTER FOR FEMALE BIRTH CONTROL: ICD-10-CM

## 2021-07-16 RX ORDER — NORGESTIMATE AND ETHINYL ESTRADIOL 7DAYSX3 LO
KIT ORAL
Qty: 28 TABLET | Refills: 0 | Status: SHIPPED | OUTPATIENT
Start: 2021-07-16 | End: 2021-08-13

## 2021-07-29 RX ORDER — VITAMIN E 268 MG
400 CAPSULE ORAL DAILY
Qty: 30 CAPSULE | Refills: 5 | Status: SHIPPED | OUTPATIENT
Start: 2021-07-29 | End: 2022-02-07 | Stop reason: SDUPTHER

## 2021-08-13 DIAGNOSIS — Z30.019 ENCOUNTER FOR FEMALE BIRTH CONTROL: ICD-10-CM

## 2021-08-13 RX ORDER — NORGESTIMATE AND ETHINYL ESTRADIOL 7DAYSX3 LO
KIT ORAL
Qty: 28 TABLET | Refills: 0 | Status: SHIPPED | OUTPATIENT
Start: 2021-08-13 | End: 2021-09-13

## 2021-09-13 DIAGNOSIS — Z30.019 ENCOUNTER FOR FEMALE BIRTH CONTROL: ICD-10-CM

## 2021-09-13 RX ORDER — NORGESTIMATE AND ETHINYL ESTRADIOL 7DAYSX3 LO
KIT ORAL
Qty: 28 TABLET | Refills: 0 | Status: SHIPPED | OUTPATIENT
Start: 2021-09-13 | End: 2021-10-14

## 2021-09-13 NOTE — TELEPHONE ENCOUNTER
Requested Prescriptions     Pending Prescriptions Disp Refills    Norgestim-Eth Estrad Triphasic (TRI-LO-SPRINTEC) 0.18/0.215/0.25 MG-25 MCG TABS [Pharmacy Med Name: TRI-LO-SPRINTEC TABLETS 28S] 28 tablet 0     Sig: TAKE 1 TABLET BY MOUTH DAILY     Last ov 1/14/21  Last lab 1/15/21

## 2021-09-27 ENCOUNTER — TELEPHONE (OUTPATIENT)
Dept: FAMILY MEDICINE CLINIC | Age: 31
End: 2021-09-27

## 2021-09-27 NOTE — TELEPHONE ENCOUNTER
Fax sent to Dr. Tammy Salinas, once it is returned with signature, pt would like it emailed to her at [email or web address]. pt needs to be notified when email is sent.  Please advise  Thank you

## 2021-09-27 NOTE — TELEPHONE ENCOUNTER
Patient requesting a letter stating that she have fibromyalgia and that you are treating her for it. Please call patient once the letter is ready [phone]. Please advise. Thanks.

## 2021-09-27 NOTE — LETTER
[address]  Phone: [phone]  Fax: [phone]    Mack Yancey DO        September 27, 2021      To whom it may concern,  My patient Krystina Richardson is being treated for fibromyalgia. If you have any questions please give our office a call at [phone].     Sincerely,        Mack Yancey DO

## 2021-09-27 NOTE — TELEPHONE ENCOUNTER
Please work with patient to draft a letter which is acceptable to her. Once drafted can route to my HIPPA compliant fax# while I am working remotely. Let her know may take 3 to 5 days since I am out of the office.

## 2021-09-28 RX ORDER — CYCLOBENZAPRINE HCL 5 MG
TABLET ORAL
Qty: 30 TABLET | Refills: 5 | OUTPATIENT
Start: 2021-09-28

## 2021-09-28 NOTE — TELEPHONE ENCOUNTER
Requested Prescriptions     Pending Prescriptions Disp Refills    cyclobenzaprine (FLEXERIL) 5 MG tablet [Pharmacy Med Name: CYCLOBENZAPRINE 5MG TABLETS] 30 tablet 5     Sig: TAKE 1 TABLET BY MOUTH THREE TIMES DAILY AS NEEDED FOR MUSCLE SPASMS     rx sent on 6/29/21 with 5 refills.

## 2021-09-29 RX ORDER — CYCLOBENZAPRINE HCL 5 MG
TABLET ORAL
Qty: 30 TABLET | Refills: 1 | Status: SHIPPED | OUTPATIENT
Start: 2021-09-29 | End: 2021-10-28

## 2021-09-29 NOTE — TELEPHONE ENCOUNTER
Requested Prescriptions     Pending Prescriptions Disp Refills    cyclobenzaprine (FLEXERIL) 5 MG tablet 30 tablet 5     Last ov  1/14/21  Last lab 1/15/21

## 2021-09-29 NOTE — TELEPHONE ENCOUNTER
Jameson Bae states that the pharmacy told her that we had denied her refill request. Jameson Bae also received a RooT message saying to contact us for more information. Looking at the telephone encounter from yesterday (09/28/2021) the refill request was denied but a reason had not been listed. Please advise, thanks.

## 2021-09-30 DIAGNOSIS — M79.18 MYOFASCIAL PAIN: ICD-10-CM

## 2021-10-01 RX ORDER — IBUPROFEN 800 MG/1
800 TABLET ORAL EVERY 8 HOURS PRN
Qty: 60 TABLET | Refills: 2 | Status: SHIPPED | OUTPATIENT
Start: 2021-10-01 | End: 2021-12-27

## 2021-10-01 NOTE — TELEPHONE ENCOUNTER
Requested Prescriptions     Pending Prescriptions Disp Refills    ibuprofen (ADVIL;MOTRIN) 800 MG tablet [Pharmacy Med Name: IBUPROFEN 800MG TABLETS] 60 tablet 2     Sig: TAKE 1 TABLET BY MOUTH EVERY 8 HOURS AS NEEDED FOR PAIN     Last ov 1/14/21  Last lab 1/15/21

## 2021-10-13 DIAGNOSIS — Z30.019 ENCOUNTER FOR FEMALE BIRTH CONTROL: ICD-10-CM

## 2021-10-14 RX ORDER — NORGESTIMATE AND ETHINYL ESTRADIOL 7DAYSX3 LO
KIT ORAL
Qty: 28 TABLET | Refills: 0 | Status: SHIPPED | OUTPATIENT
Start: 2021-10-14 | End: 2021-11-08

## 2021-10-28 DIAGNOSIS — R11.0 CHRONIC NAUSEA: ICD-10-CM

## 2021-10-28 RX ORDER — CYCLOBENZAPRINE HCL 5 MG
TABLET ORAL
Qty: 30 TABLET | Refills: 1 | Status: SHIPPED | OUTPATIENT
Start: 2021-10-28 | End: 2021-11-01 | Stop reason: SDUPTHER

## 2021-10-28 NOTE — TELEPHONE ENCOUNTER
Requested Prescriptions     Pending Prescriptions Disp Refills    cyclobenzaprine (FLEXERIL) 5 MG tablet [Pharmacy Med Name: CYCLOBENZAPRINE 5MG TABLETS] 30 tablet 1     Sig: TAKE 1 TABLET BY MOUTH THREE TIMES DAILY AS NEEDED FOR MUSCLE SPASMS     Last ov 1/14/21  Last lab 1/15/21

## 2021-10-29 DIAGNOSIS — R11.0 CHRONIC NAUSEA: ICD-10-CM

## 2021-10-29 RX ORDER — ONDANSETRON 4 MG/1
TABLET, ORALLY DISINTEGRATING ORAL
Qty: 12 TABLET | Refills: 0 | Status: SHIPPED | OUTPATIENT
Start: 2021-10-29 | End: 2021-11-01 | Stop reason: SDUPTHER

## 2021-10-29 NOTE — TELEPHONE ENCOUNTER
Requested Prescriptions     Pending Prescriptions Disp Refills    ondansetron (ZOFRAN-ODT) 4 MG disintegrating tablet [Pharmacy Med Name: ONDANSETRON ODT 4MG TABLETS] 12 tablet 0     Sig: DISSOLVE 1 TABLET ON THE TONGUE EVERY 8 HOURS AS NEEDED FOR NAUSEA OR VOMITING     Last ov 1/14/21  Last lab 1/15/21

## 2021-10-29 NOTE — TELEPHONE ENCOUNTER
Natalia Gaucher called to ask if there was anyway that He can start sending more of her Cyclobenzaprine (Flexeril) 5mg to the pharmacy so she doesn't have to go to the pharmacy every 15 days. Please advise, thanks.

## 2021-11-01 RX ORDER — ONDANSETRON 4 MG/1
TABLET, ORALLY DISINTEGRATING ORAL
Qty: 12 TABLET | Refills: 0 | Status: SHIPPED | OUTPATIENT
Start: 2021-11-01 | End: 2022-02-22 | Stop reason: SDUPTHER

## 2021-11-01 RX ORDER — CYCLOBENZAPRINE HCL 5 MG
TABLET ORAL
Qty: 60 TABLET | Refills: 2 | Status: SHIPPED | OUTPATIENT
Start: 2021-11-01 | End: 2022-02-01

## 2021-11-01 NOTE — TELEPHONE ENCOUNTER
Requested Prescriptions     Pending Prescriptions Disp Refills    ondansetron (ZOFRAN-ODT) 4 MG disintegrating tablet 12 tablet 0     Sig: DISSOLVE 1 TABLET ON THE TONGUE EVERY 8 HOURS AS NEEDED FOR NAUSEA OR VOMITING     Signed Prescriptions Disp Refills    cyclobenzaprine (FLEXERIL) 5 MG tablet 60 tablet 2     Sig: TAKE 1 TABLET BY MOUTH THREE TIMES DAILY AS NEEDED FOR MUSCLE SPASMS     Authorizing Provider: Lisa Sy     Last ov 1/14/21  Last lab 1/15/21    Pt advised about the Flexeril. Pt is wondering if she can get a refill on the Zofran as well.  Please advised

## 2021-11-07 DIAGNOSIS — Z30.019 ENCOUNTER FOR FEMALE BIRTH CONTROL: ICD-10-CM

## 2021-11-08 RX ORDER — NORGESTIMATE AND ETHINYL ESTRADIOL 7DAYSX3 LO
KIT ORAL
Qty: 28 TABLET | Refills: 0 | Status: SHIPPED | OUTPATIENT
Start: 2021-11-08 | End: 2021-12-03 | Stop reason: SDUPTHER

## 2021-12-03 DIAGNOSIS — Z30.019 ENCOUNTER FOR FEMALE BIRTH CONTROL: ICD-10-CM

## 2021-12-03 RX ORDER — NORGESTIMATE AND ETHINYL ESTRADIOL 7DAYSX3 LO
KIT ORAL
Qty: 28 TABLET | Refills: 0 | Status: SHIPPED | OUTPATIENT
Start: 2021-12-03 | End: 2022-01-04

## 2021-12-03 NOTE — TELEPHONE ENCOUNTER
Requested Prescriptions     Pending Prescriptions Disp Refills    Norgestim-Eth Estrad Triphasic (TRI-LO-SPRINTEC) 0.18/0.215/0.25 MG-25 MCG TABS 28 tablet 0     Sig: TAKE 1 TABLET BY MOUTH DAILY     Last ov 1/14/21  Last lab 1/15/21

## 2021-12-06 RX ORDER — MELATONIN
Qty: 30 TABLET | Refills: 5 | Status: SHIPPED | OUTPATIENT
Start: 2021-12-06 | End: 2022-06-27

## 2021-12-15 DIAGNOSIS — F39 MOOD DISORDER (HCC): ICD-10-CM

## 2021-12-16 RX ORDER — DESVENLAFAXINE 50 MG/1
TABLET, EXTENDED RELEASE ORAL
Qty: 30 TABLET | Refills: 5 | Status: SHIPPED | OUTPATIENT
Start: 2021-12-16 | End: 2022-06-06

## 2021-12-16 NOTE — TELEPHONE ENCOUNTER
Requested Prescriptions     Pending Prescriptions Disp Refills    desvenlafaxine succinate (PRISTIQ) 50 MG TB24 extended release tablet [Pharmacy Med Name: DESVENLAFAXINE ER SUCCINATE 50MG T] 30 tablet 5     Sig: TAKE 1 TABLET BY MOUTH DAILY     1/14/2021  Last ov 1/15/2021

## 2021-12-23 ENCOUNTER — TELEPHONE (OUTPATIENT)
Dept: FAMILY MEDICINE CLINIC | Age: 31
End: 2021-12-23

## 2021-12-23 DIAGNOSIS — J01.90 ACUTE BACTERIAL SINUSITIS: Primary | ICD-10-CM

## 2021-12-23 DIAGNOSIS — B96.89 ACUTE BACTERIAL SINUSITIS: Primary | ICD-10-CM

## 2021-12-23 RX ORDER — PSEUDOEPHEDRINE HYDROCHLORIDE 30 MG/1
30 TABLET ORAL
Qty: 12 TABLET | Refills: 0 | Status: SHIPPED | OUTPATIENT
Start: 2021-12-23 | End: 2022-12-23

## 2021-12-23 RX ORDER — AZITHROMYCIN 250 MG/1
250 TABLET, FILM COATED ORAL SEE ADMIN INSTRUCTIONS
Qty: 6 TABLET | Refills: 0 | Status: SHIPPED | OUTPATIENT
Start: 2021-12-23 | End: 2021-12-28

## 2021-12-23 NOTE — TELEPHONE ENCOUNTER
Both Emilia Ceballos and her daughter are patients of mine. This being Thursday before clinic closes tomorrow on a 3-day holiday weekend I am willing to prescribe her medication since  she likely has the same bacterial infection that her daughter has. I E scribed an antibiotic and decongestant pills to her pharmacy. Please let her know. Diagnosis Orders   1.  Acute bacterial sinusitis  pseudoephedrine (DECONGESTANT) 30 MG tablet    azithromycin (ZITHROMAX) 250 MG tablet

## 2021-12-23 NOTE — TELEPHONE ENCOUNTER
QUESTIONS  Information for Provider? Patient states that her daughter Scott Campbell   had a virtual visit with Dr. Sue Del Cid on 12/17/2021 and now she is   experiencing ear pain and sinus issues as well and would like Dr. Sue Del Cid to prescribe medication and get it sent to her pharmacy in order to ease her symptoms. Patient would like the office to follow up with her on this matter.        Please advise

## 2021-12-23 NOTE — TELEPHONE ENCOUNTER
----- Message from [facility] [place] sent at 12/23/2021  1:39 PM EST -----  Subject: Message to Provider    QUESTIONS  Information for Provider? Patient states that her daughter Evaristo Willingham   had a virtual visit with Dr. Temitope Liu on 12/17/2021 and now she is   experiencing ear pain and sinus issues as well and would like Dr. Temitope Liu   to prescribe medication and get it sent to her pharmacy in order to ease   her symptoms. Patient would like the office to follow up with her on this   matter. ---------------------------------------------------------------------------  --------------  Ky Fraction INFO  What is the best way for the office to contact you? OK to leave message on   voicemail  Preferred Call Back Phone Number? [phone]  ---------------------------------------------------------------------------  --------------  SCRIPT ANSWERS  Relationship to Patient?  Self

## 2021-12-27 DIAGNOSIS — M79.18 MYOFASCIAL PAIN: ICD-10-CM

## 2021-12-27 RX ORDER — IBUPROFEN 800 MG/1
800 TABLET ORAL EVERY 8 HOURS PRN
Qty: 60 TABLET | Refills: 2 | Status: SHIPPED | OUTPATIENT
Start: 2021-12-27 | End: 2022-02-07 | Stop reason: SDUPTHER

## 2021-12-27 NOTE — TELEPHONE ENCOUNTER
Requested Prescriptions     Pending Prescriptions Disp Refills    ibuprofen (ADVIL;MOTRIN) 800 MG tablet [Pharmacy Med Name: IBUPROFEN 800MG TABLETS] 60 tablet 2     Sig: TAKE 1 TABLET BY MOUTH EVERY 8 HOURS AS NEEDED FOR PAIN     1/14/2021  Last ov 1/15/2021

## 2022-01-03 RX ORDER — MULTIVITAMIN WITH FOLIC ACID 400 MCG
TABLET ORAL
Qty: 30 TABLET | Refills: 5 | Status: SHIPPED | OUTPATIENT
Start: 2022-01-03 | End: 2022-07-14 | Stop reason: SDUPTHER

## 2022-01-04 DIAGNOSIS — Z30.019 ENCOUNTER FOR FEMALE BIRTH CONTROL: ICD-10-CM

## 2022-01-04 RX ORDER — NORGESTIMATE AND ETHINYL ESTRADIOL 7DAYSX3 LO
KIT ORAL
Qty: 28 TABLET | Refills: 0 | Status: SHIPPED | OUTPATIENT
Start: 2022-01-04 | End: 2022-02-01

## 2022-01-13 RX ORDER — HYDROXYZINE 50 MG/1
50 TABLET, FILM COATED ORAL EVERY 8 HOURS PRN
Qty: 30 TABLET | Refills: 0 | Status: SHIPPED | OUTPATIENT
Start: 2022-01-13 | End: 2022-02-07 | Stop reason: SDUPTHER

## 2022-01-13 NOTE — TELEPHONE ENCOUNTER
Requested Prescriptions     Pending Prescriptions Disp Refills    hydrOXYzine (ATARAX) 50 MG tablet [Pharmacy Med Name: HYDROXYZINE HCL 50MG TABS (WHITE)] 30 tablet 0     Sig: TAKE 1 TABLET BY MOUTH EVERY 8 HOURS AS NEEDED FOR ITCHING OR ANXIETY     LAST OV Visit 01/14/2021  LAST LABS 01/15/2021

## 2022-01-31 DIAGNOSIS — Z30.019 ENCOUNTER FOR FEMALE BIRTH CONTROL: ICD-10-CM

## 2022-01-31 DIAGNOSIS — J30.1 ALLERGIC RHINITIS DUE TO POLLEN, UNSPECIFIED SEASONALITY: ICD-10-CM

## 2022-02-01 DIAGNOSIS — F39 MOOD DISORDER (HCC): ICD-10-CM

## 2022-02-01 RX ORDER — NORGESTIMATE AND ETHINYL ESTRADIOL 7DAYSX3 LO
KIT ORAL
Qty: 28 TABLET | Refills: 0 | Status: SHIPPED | OUTPATIENT
Start: 2022-02-01 | End: 2022-02-07 | Stop reason: SDUPTHER

## 2022-02-01 RX ORDER — CYCLOBENZAPRINE HCL 5 MG
TABLET ORAL
Qty: 60 TABLET | Refills: 2 | Status: SHIPPED | OUTPATIENT
Start: 2022-02-01 | End: 2022-02-07 | Stop reason: SDUPTHER

## 2022-02-01 RX ORDER — BUSPIRONE HYDROCHLORIDE 10 MG/1
10 TABLET ORAL DAILY
Qty: 90 TABLET | Refills: 1 | Status: SHIPPED | OUTPATIENT
Start: 2022-02-01 | End: 2022-02-07 | Stop reason: SDUPTHER

## 2022-02-01 RX ORDER — LORATADINE 10 MG/1
10 TABLET ORAL DAILY
Qty: 30 TABLET | Refills: 0 | Status: SHIPPED | OUTPATIENT
Start: 2022-02-01 | End: 2022-02-07 | Stop reason: SDUPTHER

## 2022-02-02 RX ORDER — HYDROXYZINE 50 MG/1
50 TABLET, FILM COATED ORAL EVERY 8 HOURS PRN
Qty: 30 TABLET | Refills: 0 | OUTPATIENT
Start: 2022-02-02 | End: 2022-02-12

## 2022-02-07 ENCOUNTER — VIRTUAL VISIT (OUTPATIENT)
Dept: FAMILY MEDICINE CLINIC | Age: 32
End: 2022-02-07
Payer: MEDICAID

## 2022-02-07 DIAGNOSIS — Z30.019 ENCOUNTER FOR FEMALE BIRTH CONTROL: ICD-10-CM

## 2022-02-07 DIAGNOSIS — J30.1 ALLERGIC RHINITIS DUE TO POLLEN, UNSPECIFIED SEASONALITY: ICD-10-CM

## 2022-02-07 DIAGNOSIS — F39 MOOD DISORDER (HCC): ICD-10-CM

## 2022-02-07 DIAGNOSIS — M79.18 MYOFASCIAL PAIN: ICD-10-CM

## 2022-02-07 PROCEDURE — G8427 DOCREV CUR MEDS BY ELIG CLIN: HCPCS | Performed by: FAMILY MEDICINE

## 2022-02-07 PROCEDURE — 99213 OFFICE O/P EST LOW 20 MIN: CPT | Performed by: FAMILY MEDICINE

## 2022-02-07 RX ORDER — CYCLOBENZAPRINE HCL 5 MG
TABLET ORAL
Qty: 60 TABLET | Refills: 5 | Status: SHIPPED | OUTPATIENT
Start: 2022-02-07 | End: 2022-10-26 | Stop reason: SDUPTHER

## 2022-02-07 RX ORDER — VITAMIN E 268 MG
400 CAPSULE ORAL DAILY
Qty: 30 CAPSULE | Refills: 5 | Status: SHIPPED | OUTPATIENT
Start: 2022-02-07 | End: 2022-09-09

## 2022-02-07 RX ORDER — IBUPROFEN 800 MG/1
800 TABLET ORAL EVERY 8 HOURS PRN
Qty: 60 TABLET | Refills: 5 | Status: SHIPPED | OUTPATIENT
Start: 2022-02-07 | End: 2022-08-18 | Stop reason: SDUPTHER

## 2022-02-07 RX ORDER — BUSPIRONE HYDROCHLORIDE 10 MG/1
10 TABLET ORAL DAILY
Qty: 90 TABLET | Refills: 1 | Status: SHIPPED | OUTPATIENT
Start: 2022-02-07

## 2022-02-07 RX ORDER — HYDROXYZINE 50 MG/1
50 TABLET, FILM COATED ORAL EVERY 8 HOURS PRN
Qty: 30 TABLET | Refills: 5 | Status: SHIPPED | OUTPATIENT
Start: 2022-02-07 | End: 2022-02-17

## 2022-02-07 RX ORDER — LORATADINE 10 MG/1
10 TABLET ORAL DAILY
Qty: 30 TABLET | Refills: 5 | Status: SHIPPED | OUTPATIENT
Start: 2022-02-07 | End: 2022-09-09

## 2022-02-07 RX ORDER — TRAZODONE HYDROCHLORIDE 100 MG/1
TABLET ORAL
Qty: 90 TABLET | Refills: 1 | Status: SHIPPED | OUTPATIENT
Start: 2022-02-07 | End: 2022-09-29

## 2022-02-07 RX ORDER — NORGESTIMATE AND ETHINYL ESTRADIOL 7DAYSX3 LO
KIT ORAL
Qty: 28 TABLET | Refills: 5 | Status: SHIPPED | OUTPATIENT
Start: 2022-02-07

## 2022-02-07 SDOH — ECONOMIC STABILITY: FOOD INSECURITY: WITHIN THE PAST 12 MONTHS, YOU WORRIED THAT YOUR FOOD WOULD RUN OUT BEFORE YOU GOT MONEY TO BUY MORE.: NEVER TRUE

## 2022-02-07 SDOH — ECONOMIC STABILITY: FOOD INSECURITY: WITHIN THE PAST 12 MONTHS, THE FOOD YOU BOUGHT JUST DIDN'T LAST AND YOU DIDN'T HAVE MONEY TO GET MORE.: NEVER TRUE

## 2022-02-07 ASSESSMENT — PATIENT HEALTH QUESTIONNAIRE - PHQ9
5. POOR APPETITE OR OVEREATING: 0
2. FEELING DOWN, DEPRESSED OR HOPELESS: 0
SUM OF ALL RESPONSES TO PHQ QUESTIONS 1-9: 0
4. FEELING TIRED OR HAVING LITTLE ENERGY: 0
SUM OF ALL RESPONSES TO PHQ QUESTIONS 1-9: 0
SUM OF ALL RESPONSES TO PHQ QUESTIONS 1-9: 0
SUM OF ALL RESPONSES TO PHQ9 QUESTIONS 1 & 2: 0
9. THOUGHTS THAT YOU WOULD BE BETTER OFF DEAD, OR OF HURTING YOURSELF: 0
7. TROUBLE CONCENTRATING ON THINGS, SUCH AS READING THE NEWSPAPER OR WATCHING TELEVISION: 0
10. IF YOU CHECKED OFF ANY PROBLEMS, HOW DIFFICULT HAVE THESE PROBLEMS MADE IT FOR YOU TO DO YOUR WORK, TAKE CARE OF THINGS AT HOME, OR GET ALONG WITH OTHER PEOPLE: 0
8. MOVING OR SPEAKING SO SLOWLY THAT OTHER PEOPLE COULD HAVE NOTICED. OR THE OPPOSITE, BEING SO FIGETY OR RESTLESS THAT YOU HAVE BEEN MOVING AROUND A LOT MORE THAN USUAL: 0
3. TROUBLE FALLING OR STAYING ASLEEP: 0
1. LITTLE INTEREST OR PLEASURE IN DOING THINGS: 0
6. FEELING BAD ABOUT YOURSELF - OR THAT YOU ARE A FAILURE OR HAVE LET YOURSELF OR YOUR FAMILY DOWN: 0
SUM OF ALL RESPONSES TO PHQ QUESTIONS 1-9: 0

## 2022-02-07 ASSESSMENT — SOCIAL DETERMINANTS OF HEALTH (SDOH): HOW HARD IS IT FOR YOU TO PAY FOR THE VERY BASICS LIKE FOOD, HOUSING, MEDICAL CARE, AND HEATING?: NOT HARD AT ALL

## 2022-02-07 NOTE — PROGRESS NOTES
[facility]  TELEMEDICINE Progress Note  Sherrell Julien DO      The risks and benefits of converting to a virtual visit were discussed in light of the current infectious disease epidemic. Patient also understood that insurance coverage and co-pays are up to their individual insurance plans. Patient identification was verified at the start of the visit. Bee Romero  1990 02/08/22    Patient location: Home address on file  Provider location: Physician's home    Chief Complaint:   Bee Romero is a 32 y.o. patient who is here for follow-up on myofascial pain      HPI:   Patient is aware of my departure from this practice. She requests renewal of her medications which has been keeping her medical condition stable. She has good support from her significant other Roberto Baltazar and is happy with raising her 1year-old child. Doing well with her medications. ROS negative for headache, vision changes, chest pain, shortness of breath, abdominal pain, urinary sx, bowel changes. Past medical, surgical, and social history reviewed. Medications and allergies reviewed. Allergies   Allergen Reactions    Meloxicam Other (See Comments)     Pt states it just knocked her out      Prior to Visit Medications    Medication Sig Taking?  Authorizing Provider   hydrOXYzine (ATARAX) 50 MG tablet Take 1 tablet by mouth every 8 hours as needed for Itching or Anxiety Yes Lorayne Brantte Sam, DO   vitamin E 400 UNIT capsule Take 1 capsule by mouth daily Yes Lorayne Brantte Sam, DO   loratadine (CLARITIN) 10 MG tablet Take 1 tablet by mouth daily Yes Lorayne Claribel Vargas, DO   cyclobenzaprine (FLEXERIL) 5 MG tablet TAKE 1 TABLET BY MOUTH THREE TIMES DAILY AS NEEDED FOR MUSCLE SPASMS Yes Lorayne Brantte Sam, DO   Norgestim-Eth Estrad Triphasic (TRI-LO-SPRINTEC) 0.18/0.215/0.25 MG-25 MCG TABS TAKE 1 TABLET BY MOUTH DAILY Yes Lorayne Claribel Vargas, DO   traZODone (DESYREL) 100 MG tablet TAKE 1 TABLET BY MOUTH EVERY NIGHT Yes Saqib Vargas DO   busPIRone (BUSPAR) 10 MG tablet Take 1 tablet by mouth daily Yes Saqib Vargas DO   ibuprofen (ADVIL;MOTRIN) 800 MG tablet Take 1 tablet by mouth every 8 hours as needed for Pain Yes Saqib Vargas DO   Multiple Vitamin (DAILY-YOKASTA MULTIVITAMIN) TABS TAKE 1 TABLET BY MOUTH DAILY Yes Saqib Vargas DO   desvenlafaxine succinate (PRISTIQ) 50 MG TB24 extended release tablet TAKE 1 TABLET BY MOUTH DAILY Yes Saqib Vargas DO   vitamin D3 (CHOLECALCIFEROL) 25 MCG (1000 UT) TABS tablet TAKE 1 TABLET BY MOUTH DAILY Yes Saqib Varags DO   ondansetron (ZOFRAN-ODT) 4 MG disintegrating tablet DISSOLVE 1 TABLET ON THE TONGUE EVERY 8 HOURS AS NEEDED FOR NAUSEA OR VOMITING Yes Saqib Vargas DO   VITAMIN D PO Take by mouth Yes Historical Provider, MD   pseudoephedrine (DECONGESTANT) 30 MG tablet Take 1 tablet by mouth every 6-8 hours as needed for Congestion  Patient not taking: Reported on 2/7/2022  Saqib Vargas DO          Patient-Reported Vitals 2/7/2022   Patient-Reported Weight 168   Patient-Reported Height 5'4      There were no vitals filed for this visit.    Wt Readings from Last 3 Encounters:   01/14/21 168 lb 3.2 oz (76.3 kg)   02/25/20 150 lb 8 oz (68.3 kg)   01/10/20 140 lb 8 oz (63.7 kg)     BP Readings from Last 3 Encounters:   01/14/21 110/87   02/25/20 127/89   01/10/20 117/81       Patient Active Problem List   Diagnosis    Mood disorder ([address]    Acne    Myofascial pain    Neck swelling    History of sinus tachycardia    Tobacco use    Generalized anxiety disorder    Cigarette nicotine dependence with nicotine-induced disorder    Hypermobility arthralgia    Undifferentiated connective tissue disease ([address]       Immunization History   Administered Date(s) Administered    COVID-19, Pfizer Purple top, DILUTE for use, 12+ yrs, 30mcg/0.3mL dose 08/29/2021, 09/19/2021  Influenza, Quadv, IM, (6 mo and older Fluzone, Flulaval, Fluarix and 3 yrs and older Afluria) 12/05/2017       Past Medical History:   Diagnosis Date    Seasonal allergies      Past Surgical History:   Procedure Laterality Date    TONSILLECTOMY  2007     Family History   Problem Relation Age of Onset    Other Mother         mixed tissue disorder     Social History     Socioeconomic History    Marital status: Single     Spouse name: Not on file    Number of children: Not on file    Years of education: Not on file    Highest education level: Not on file   Occupational History    Not on file   Tobacco Use    Smoking status: Current Every Day Smoker     Packs/day: 0.15    Smokeless tobacco: Never Used   Substance and Sexual Activity    Alcohol use: No    Drug use: No    Sexual activity: Not on file   Other Topics Concern    Not on file   Social History Narrative    Not on file     Social Determinants of Health     Financial Resource Strain: Low Risk     Difficulty of Paying Living Expenses: Not hard at all   Food Insecurity: No Food Insecurity    Worried About Running Out of Food in the Last Year: Never true    Vidal of Food in the Last Year: Never true   Transportation Needs:     Lack of Transportation (Medical): Not on file    Lack of Transportation (Non-Medical):  Not on file   Physical Activity:     Days of Exercise per Week: Not on file    Minutes of Exercise per Session: Not on file   Stress:     Feeling of Stress : Not on file   Social Connections:     Frequency of Communication with Friends and Family: Not on file    Frequency of Social Gatherings with Friends and Family: Not on file    Attends Adventism Services: Not on file    Active Member of Clubs or Organizations: Not on file    Attends Club or Organization Meetings: Not on file    Marital Status: Not on file   Intimate Partner Violence:     Fear of Current or Ex-Partner: Not on file    Emotionally Abused: Not on file   Gordon Ewing Diagnosis Orders   1. Allergic rhinitis due to pollen, unspecified seasonality  loratadine (CLARITIN) 10 MG tablet   2. Encounter for female birth control  Norgestim-Eth Estrad Triphasic (TRI-LO-SPRINTEC) 0.18/0.215/0.25 MG-25 MCG TABS   3. Mood disorder (HCC)  traZODone (DESYREL) 100 MG tablet    busPIRone (BUSPAR) 10 MG tablet   4. Myofascial pain  cyclobenzaprine (FLEXERIL) 5 MG tablet    ibuprofen (ADVIL;MOTRIN) 800 MG tablet       #1-4: Doing well overall with above conditions. Medications renewed. Encouraged to find a replacement PCP. I gave her some information about recommended practices in her local area. Recommended to eventually get routine chemistry panel sometime in the next 12 months and keep up with her women's health. PLAN          Time spent on encounter (including any number of the following: review of labs, imaging, provider notes, outside hospital records; performing examination/evaluation; counseling patient and family; ordering medications/tests; placing referral 20 minutes  Established E/M: 10-19 (38419), 20-29 (91620), 30-39 (28540), 40-54 (04293)   New E/M: 15-29 (28112), 30-44 (40897), 45-59 (17390), 60-74 (67513)  Telephone E/M: 5-10 (Mee), 11-20 (80821), 21-30 (75758)    If applicable, see additional patient information and instructions under \"Patient Instructions. \"    Return if symptoms worsen or fail to improve. There are no Patient Instructions on file for this visit. Please note a portion of this chart was generated using dragon dictation software. Although every effort was made to ensure the accuracy of this automated transcription, some errors in transcription may have occurred. Services were provided through a  video synchronous discussion virtually to substitute for in-person clinic visit. Patient was instructed that the AVS is available on My Chart or was emailed to the patient if not on My Chart.  Lab orders were emailed to patient if they do not use a [facility] lab. Any work notes were sent to patient through My Chart or email. This patient was evaluated through a synchronous (real-time) audio-video encounter. The patient (or guardian if applicable) is aware that this is a billable service, which includes applicable co-pays. This Virtual Visit was conducted with patient's (and/or legal guardian's) consent. The visit was conducted pursuant to the emergency declaration under the [address] [place] [address] authority and the [employer] Act. Patient identification was verified, and a caregiver was present when appropriate. The patient was located in a state where the provider was licensed to provide care.

## 2022-02-22 ENCOUNTER — TELEPHONE (OUTPATIENT)
Dept: FAMILY MEDICINE CLINIC | Age: 32
End: 2022-02-22

## 2022-02-22 DIAGNOSIS — R11.0 CHRONIC NAUSEA: ICD-10-CM

## 2022-02-22 RX ORDER — ONDANSETRON 4 MG/1
TABLET, ORALLY DISINTEGRATING ORAL
Qty: 12 TABLET | Refills: 0 | Status: SHIPPED | OUTPATIENT
Start: 2022-02-22

## 2022-02-22 NOTE — TELEPHONE ENCOUNTER
ondansetron (ZOFRAN-ODT) 4 MG disintegrating tablet [[id number]]     Order Details  Dose, Route, Frequency: As Directed   Dispense Quantity: 12 tablet Refills: 0          Sig: DISSOLVE 1 TABLET ON THE TONGUE EVERY 8 HOURS AS NEEDED FOR NAUSEA OR VOMITING         Start Date: 11/01/21 End Date: --   Written Date: 11/01/21 Expiration Date: 11/01/22       Diagnosis Association: Chronic nausea (R11.0)   Original Order:  ondansetron (ZOFRAN-ODT) 4 MG disintegrating tablet [[id number]]     Pharmacy    [facility] [address] [phone] [facility] [phone]      Last OV: 2/7/22  Last Labs: 1/15/21

## 2022-02-23 SDOH — HEALTH STABILITY: PHYSICAL HEALTH: ON AVERAGE, HOW MANY DAYS PER WEEK DO YOU ENGAGE IN MODERATE TO STRENUOUS EXERCISE (LIKE A BRISK WALK)?: 5 DAYS

## 2022-02-23 SDOH — HEALTH STABILITY: PHYSICAL HEALTH: ON AVERAGE, HOW MANY MINUTES DO YOU ENGAGE IN EXERCISE AT THIS LEVEL?: 30 MIN

## 2022-02-24 ENCOUNTER — OFFICE VISIT (OUTPATIENT)
Dept: FAMILY MEDICINE CLINIC | Age: 32
End: 2022-02-24
Payer: MEDICAID

## 2022-02-24 VITALS
HEIGHT: 69 IN | HEART RATE: 89 BPM | WEIGHT: 148 LBS | DIASTOLIC BLOOD PRESSURE: 84 MMHG | BODY MASS INDEX: 21.92 KG/M2 | OXYGEN SATURATION: 99 % | SYSTOLIC BLOOD PRESSURE: 110 MMHG | TEMPERATURE: 97.6 F

## 2022-02-24 DIAGNOSIS — Z00.00 PREVENTATIVE HEALTH CARE: Primary | ICD-10-CM

## 2022-02-24 PROCEDURE — G8484 FLU IMMUNIZE NO ADMIN: HCPCS | Performed by: NURSE PRACTITIONER

## 2022-02-24 PROCEDURE — 99395 PREV VISIT EST AGE 18-39: CPT | Performed by: NURSE PRACTITIONER

## 2022-02-24 NOTE — PROGRESS NOTES
Raad Escoto  : 1990  Encounter date: 2022    This donovan 32 y.o. female who presents with  Chief Complaint   Patient presents with    New Patient     establish care       History of present illness:    HPI   History and Physical      Raad Escoto  YOB: 1990    Date of Service:  2022    Chief Complaint:   Raad Escoto is a 32 y.o. female who  presents for physical examination. HPI: PT is 32year old female, new to practice, recent labs completed 2022, showed underactive thyroid, TSH- 6.08 with NL T4.  PT with existing mood and anxiety disorder, and connective tissue disease.       Wt Readings from Last 3 Encounters:   22 148 lb (67.1 kg)   21 168 lb 3.2 oz (76.3 kg)   20 150 lb 8 oz (68.3 kg)     BP Readings from Last 3 Encounters:   22 110/84   21 110/87   20 127/89       Patient Active Problem List   Diagnosis    Mood disorder ([address]    Acne    Myofascial pain    Neck swelling    History of sinus tachycardia    Tobacco use    Generalized anxiety disorder    Cigarette nicotine dependence with nicotine-induced disorder    Hypermobility arthralgia    Undifferentiated connective tissue disease ([address]       Preventive Care:  Health Maintenance   Topic Date Due    Hepatitis C screen  Never done    Varicella vaccine (1 of 2 - 2-dose childhood series) Never done    Pneumococcal 0-64 years Vaccine (1 of 2 - PPSV23) Never done    HIV screen  Never done    Flu vaccine (1) 2021    COVID-19 Vaccine (3 - Booster for Pfizer series) 2022    Cervical cancer screen  2022    Depression Screen  2023    DTaP/Tdap/Td vaccine (2 - Td or Tdap) 2028    Hepatitis A vaccine  Aged Out    Hepatitis B vaccine  Aged Out    Hib vaccine  Aged Out    Meningococcal (ACWY) vaccine  Aged Out      Hx abnormal PAP: NA, recommended referral  Sexual activity: single partner, contraception - OCP (estrogen/progesterone)   Self-breast exams: yes  Previous DEXA scan: no  Last eye exam: 2021  Exercise: daily, treadmill  Lipid panel: No results found for: CHOL, TRIG, HDL, LDLCALC, LDLDIRECT     Living will: no, recommended    Immunization History   Administered Date(s) Administered    COVID-19, Pfizer Purple top, DILUTE for use, 12+ yrs, 30mcg/0.3mL dose 08/29/2021, 09/19/2021    Influenza Virus Vaccine 12/05/2017    Influenza, Kaity Dayhoff, IM, (6 mo and older Fluzone, Flulaval, Fluarix and 3 yrs and older Afluria) 12/05/2017    Influenza, Quadv, IM, PF (6 mo and older Fluzone, Flulaval, Fluarix, and 3 yrs and older Afluria) 12/19/2017    Tdap (Boostrix, Adacel) 02/14/2018       Allergies   Allergen Reactions    Meloxicam Other (See Comments)     Pt states it just knocked her out      Outpatient Medications Marked as Taking for the 2/24/22 encounter (Office Visit) with IGGY Zelaya - NP   Medication Sig Dispense Refill    ondansetron (ZOFRAN-ODT) 4 MG disintegrating tablet DISSOLVE 1 TABLET ON THE TONGUE EVERY 8 HOURS AS NEEDED FOR NAUSEA OR VOMITING 12 tablet 0    vitamin E 400 UNIT capsule Take 1 capsule by mouth daily 30 capsule 5    loratadine (CLARITIN) 10 MG tablet Take 1 tablet by mouth daily 30 tablet 5    cyclobenzaprine (FLEXERIL) 5 MG tablet TAKE 1 TABLET BY MOUTH THREE TIMES DAILY AS NEEDED FOR MUSCLE SPASMS 60 tablet 5    Norgestim-Eth Estrad Triphasic (TRI-LO-SPRINTEC) 0.18/0.215/0.25 MG-25 MCG TABS TAKE 1 TABLET BY MOUTH DAILY 28 tablet 5    traZODone (DESYREL) 100 MG tablet TAKE 1 TABLET BY MOUTH EVERY NIGHT 90 tablet 1    busPIRone (BUSPAR) 10 MG tablet Take 1 tablet by mouth daily 90 tablet 1    ibuprofen (ADVIL;MOTRIN) 800 MG tablet Take 1 tablet by mouth every 8 hours as needed for Pain 60 tablet 5    Multiple Vitamin (DAILY-YOKASTA MULTIVITAMIN) TABS TAKE 1 TABLET BY MOUTH DAILY 30 tablet 5    desvenlafaxine succinate (PRISTIQ) 50 MG TB24 extended release tablet TAKE 1 TABLET BY MOUTH DAILY 30 tablet 5    vitamin D3 (CHOLECALCIFEROL) 25 MCG (1000 UT) TABS tablet TAKE 1 TABLET BY MOUTH DAILY 30 tablet 5       Past Medical History:   Diagnosis Date    Seasonal allergies      Past Surgical History:   Procedure Laterality Date    TONSILLECTOMY  2007     Family History   Problem Relation Age of Onset    Other Mother         mixed tissue disorder     Social History     Socioeconomic History    Marital status: Single     Spouse name: Not on file    Number of children: Not on file    Years of education: Not on file    Highest education level: Not on file   Occupational History    Not on file   Tobacco Use    Smoking status: Current Every Day Smoker     Packs/day: 0.15    Smokeless tobacco: Never Used   Substance and Sexual Activity    Alcohol use: No    Drug use: Yes     Types: Marijuana Lorenza Mccall)     Comment: Medical Marijuana    Sexual activity: Not on file   Other Topics Concern    Not on file   Social History Narrative    Not on file     Social Determinants of Health     Financial Resource Strain: Low Risk     Difficulty of Paying Living Expenses: Not hard at all   Food Insecurity: No Food Insecurity    Worried About Running Out of Food in the Last Year: Never true    Vidal of Food in the Last Year: Never true   Transportation Needs:     Lack of Transportation (Medical): Not on file    Lack of Transportation (Non-Medical):  Not on file   Physical Activity: Sufficiently Active    Days of Exercise per Week: 5 days    Minutes of Exercise per Session: 30 min   Stress:     Feeling of Stress : Not on file   Social Connections:     Frequency of Communication with Friends and Family: Not on file    Frequency of Social Gatherings with Friends and Family: Not on file    Attends Buddhist Services: Not on file    Active Member of Clubs or Organizations: Not on file    Attends Club or Organization Meetings: Not on file    Marital Status: Not on file   Intimate Partner Violence: Not At Risk    Fear of Current or Ex-Partner: No    Emotionally Abused: No    Physically Abused: No    Sexually Abused: No   Housing Stability:     Unable to Pay for Housing in the Last Year: Not on file    Number of Places Lived in the Last Year: Not on file    Unstable Housing in the Last Year: Not on file       Review of Systems:  A comprehensive review of systems was negative except for what was noted in the HPI. Physical Exam:   Vitals:    02/24/22 1301   BP: 110/84   Site: Left Upper Arm   Position: Sitting   Cuff Size: Medium Adult   Pulse: 89   Temp: 97.6 °F (36.4 °C)   SpO2: 99%   Weight: 148 lb (67.1 kg)   Height: 5' 9\" (1.753 m)     Body mass index is 21.86 kg/m². Constitutional: She is oriented to person, place, and time. She appears well-developed and well-nourished. No distress. HEENT:   Head: Normocephalic and atraumatic. Right Ear: Tympanic membrane, external ear and ear canal normal.   Left Ear: Tympanic membrane, external ear and ear canal normal.   Nose: Nose normal.   Mouth/Throat: Oropharynx is clear and moist, and mucous membranes are normal.  There is no cervical adenopathy. Eyes: Conjunctivae and extraocular motions are normal. Pupils are equal, round, and reactive to light. Neck: Neck supple. No JVD present. Carotid bruit is not present. No mass and no thyromegaly present. Cardiovascular: Normal rate, regular rhythm, normal heart sounds and intact distal pulses. Exam reveals no gallop and no friction rub. No murmur heard. Pulmonary/Chest: Effort normal and breath sounds normal. No respiratory distress. She has no wheezes, rhonchi or rales. Abdominal: Soft, non-tender. Bowel sounds and aorta are normal. She exhibits no organomegaly, mass or bruit. Breast exam:  not examined. Musculoskeletal: Normal range of motion, no synovitis. She exhibits no edema. Neurological: She is alert and oriented to person, place, and time. She has normal reflexes. No cranial nerve deficit. Coordination normal.   Skin: Skin is warm and dry. There is no rash or erythema. No suspicious lesions noted. Psychiatric: She has a normal mood and affect. Her speech is normal and behavior is normal. Judgment, cognition and memory are normal.     Assessment/Plan:    Renee Damon was seen today for new patient.     Diagnoses and all orders for this visit:    [facility]  -     Hugo Segura MD, Gynecology, [facility]            Current Outpatient Medications on File Prior to Visit   Medication Sig Dispense Refill    ondansetron (ZOFRAN-ODT) 4 MG disintegrating tablet DISSOLVE 1 TABLET ON THE TONGUE EVERY 8 HOURS AS NEEDED FOR NAUSEA OR VOMITING 12 tablet 0    vitamin E 400 UNIT capsule Take 1 capsule by mouth daily 30 capsule 5    loratadine (CLARITIN) 10 MG tablet Take 1 tablet by mouth daily 30 tablet 5    cyclobenzaprine (FLEXERIL) 5 MG tablet TAKE 1 TABLET BY MOUTH THREE TIMES DAILY AS NEEDED FOR MUSCLE SPASMS 60 tablet 5    Norgestim-Eth Estrad Triphasic (TRI-LO-SPRINTEC) 0.18/0.215/0.25 MG-25 MCG TABS TAKE 1 TABLET BY MOUTH DAILY 28 tablet 5    traZODone (DESYREL) 100 MG tablet TAKE 1 TABLET BY MOUTH EVERY NIGHT 90 tablet 1    busPIRone (BUSPAR) 10 MG tablet Take 1 tablet by mouth daily 90 tablet 1    ibuprofen (ADVIL;MOTRIN) 800 MG tablet Take 1 tablet by mouth every 8 hours as needed for Pain 60 tablet 5    Multiple Vitamin (DAILY-YOKASTA MULTIVITAMIN) TABS TAKE 1 TABLET BY MOUTH DAILY 30 tablet 5    desvenlafaxine succinate (PRISTIQ) 50 MG TB24 extended release tablet TAKE 1 TABLET BY MOUTH DAILY 30 tablet 5    vitamin D3 (CHOLECALCIFEROL) 25 MCG (1000 UT) TABS tablet TAKE 1 TABLET BY MOUTH DAILY 30 tablet 5    pseudoephedrine (DECONGESTANT) 30 MG tablet Take 1 tablet by mouth every 6-8 hours as needed for Congestion (Patient not taking: Reported on 2/7/2022) 12 tablet 0    VITAMIN D PO Take by mouth (Patient not taking: Reported on 2/24/2022)       No current facility-administered medications on file prior to visit. Allergies   Allergen Reactions    Meloxicam Other (See Comments)     Pt states it just knocked her out      Past Medical History:   Diagnosis Date    Seasonal allergies       Past Surgical History:   Procedure Laterality Date    TONSILLECTOMY  2007      Family History   Problem Relation Age of Onset    Other Mother         mixed tissue disorder      Social History     Tobacco Use    Smoking status: Current Every Day Smoker     Packs/day: 0.15    Smokeless tobacco: Never Used   Substance Use Topics    Alcohol use: No        Review of Systems    Objective:    /84 (Site: Left Upper Arm, Position: Sitting, Cuff Size: Medium Adult)   Pulse 89   Temp 97.6 °F (36.4 °C)   Ht 5' 9\" (1.753 m)   Wt 148 lb (67.1 kg)   LMP 02/11/2022   SpO2 99%   BMI 21.86 kg/m²   Weight: 148 lb (67.1 kg)     BP Readings from Last 3 Encounters:   02/24/22 110/84   01/14/21 110/87   02/25/20 127/89     Wt Readings from Last 3 Encounters:   02/24/22 148 lb (67.1 kg)   01/14/21 168 lb 3.2 oz (76.3 kg)   02/25/20 150 lb 8 oz (68.3 kg)     BMI Readings from Last 3 Encounters:   02/24/22 21.86 kg/m²   01/14/21 24.84 kg/m²   02/25/20 22.22 kg/m²       Physical Exam    Assessment/Plan    1. Preventative health care  Advised routine dental and vision  Increased exercise, healthy diet and weight loss  Advised living will  - AYANA - Ramon Alvarez MD, Gynecology, [facility]      Return in about 1 year (around 2/24/2023) for annual check up. This dictation was generated by voice recognition computer software. Although all attempts are made to edit the dictation for accuracy, there may be errors in the transcription that are not intended.

## 2022-03-19 PROBLEM — F39 MOOD DISORDER (HCC): Status: ACTIVE | Noted: 2019-09-30

## 2022-03-19 PROBLEM — F41.9 ANXIETY: Status: ACTIVE | Noted: 2019-09-30

## 2022-06-05 DIAGNOSIS — F39 MOOD DISORDER (HCC): ICD-10-CM

## 2022-06-06 RX ORDER — DESVENLAFAXINE 50 MG/1
TABLET, EXTENDED RELEASE ORAL
Qty: 30 TABLET | Refills: 5 | Status: SHIPPED | OUTPATIENT
Start: 2022-06-06

## 2022-06-27 RX ORDER — MELATONIN
Qty: 30 TABLET | Refills: 5 | Status: SHIPPED | OUTPATIENT
Start: 2022-06-27

## 2022-07-14 ENCOUNTER — TELEPHONE (OUTPATIENT)
Dept: FAMILY MEDICINE CLINIC | Age: 32
End: 2022-07-14

## 2022-07-14 DIAGNOSIS — Z00.00 PREVENTATIVE HEALTH CARE: Primary | ICD-10-CM

## 2022-07-14 RX ORDER — MULTIVITAMIN WITH FOLIC ACID 400 MCG
TABLET ORAL
Qty: 30 TABLET | Refills: 5 | Status: SHIPPED | OUTPATIENT
Start: 2022-07-14

## 2022-07-14 NOTE — TELEPHONE ENCOUNTER
Patient calling to get clarification on why she is not able to get her daily vitamin. States she had called the pharmacy and pharmacy stated they sent us over a request but we have denied it. Call back number [phone].

## 2022-08-14 DIAGNOSIS — M79.18 MYOFASCIAL PAIN: ICD-10-CM

## 2022-08-15 RX ORDER — IBUPROFEN 800 MG/1
800 TABLET ORAL EVERY 8 HOURS PRN
Qty: 60 TABLET | Refills: 5 | OUTPATIENT
Start: 2022-08-15

## 2022-08-18 ENCOUNTER — TELEPHONE (OUTPATIENT)
Dept: FAMILY MEDICINE CLINIC | Age: 32
End: 2022-08-18

## 2022-08-18 DIAGNOSIS — M79.18 MYOFASCIAL PAIN: ICD-10-CM

## 2022-08-18 RX ORDER — IBUPROFEN 800 MG/1
800 TABLET ORAL EVERY 8 HOURS PRN
Qty: 60 TABLET | Refills: 5 | Status: SHIPPED | OUTPATIENT
Start: 2022-08-18

## 2022-09-08 DIAGNOSIS — J30.1 ALLERGIC RHINITIS DUE TO POLLEN, UNSPECIFIED SEASONALITY: ICD-10-CM

## 2022-09-08 RX ORDER — LORATADINE 10 MG/1
10 TABLET ORAL DAILY
Qty: 30 TABLET | Refills: 1 | OUTPATIENT
Start: 2022-09-08

## 2022-09-08 RX ORDER — VITAMIN E 268 MG
400 CAPSULE ORAL DAILY
Qty: 30 CAPSULE | Refills: 1 | OUTPATIENT
Start: 2022-09-08

## 2022-09-09 DIAGNOSIS — J30.1 ALLERGIC RHINITIS DUE TO POLLEN, UNSPECIFIED SEASONALITY: ICD-10-CM

## 2022-09-09 RX ORDER — VITAMIN E 268 MG
400 CAPSULE ORAL DAILY
Qty: 30 CAPSULE | Refills: 5 | Status: SHIPPED | OUTPATIENT
Start: 2022-09-09

## 2022-09-09 RX ORDER — LORATADINE 10 MG/1
10 TABLET ORAL DAILY
Qty: 30 TABLET | Refills: 5 | Status: SHIPPED | OUTPATIENT
Start: 2022-09-09

## 2022-09-09 NOTE — TELEPHONE ENCOUNTER
----- Message from Jessica Woods sent at 9/9/2022  3:51 PM EDT -----  Subject: Refill Request    QUESTIONS  Name of Medication? loratadine (CLARITIN) 10 MG tablet  Patient-reported dosage and instructions? 10 mg once daily  How many days do you have left? 3  [id number]  Pharmacy phone number (if available)? [phone]  ---------------------------------------------------------------------------  --------------,  Name of Medication? vitamin E 400 UNIT capsule  Patient-reported dosage and instructions? 400 UNIT  How many days do you have left? 0  [facility]? [facility][id number]  Pharmacy phone number (if available)? [phone]  ---------------------------------------------------------------------------  --------------  Araceli Agnieszka INFO  What is the best way for the office to contact you? OK to leave message on   voicemail  Preferred Call Back Phone Number? [phone]  ---------------------------------------------------------------------------  --------------  SCRIPT ANSWERS  Relationship to Patient?  Self

## 2022-09-09 NOTE — TELEPHONE ENCOUNTER
Medication:   Requested Prescriptions     Pending Prescriptions Disp Refills    vitamin E 400 UNIT capsule [Pharmacy Med Name: VITAMIN E 400IU CAPSULES] 30 capsule 5     Sig: TAKE 1 CAPSULE BY MOUTH DAILY    loratadine (CLARITIN) 10 MG tablet [Pharmacy Med Name: LORATADINE 10MG TABLETS] 30 tablet 5     Sig: TAKE 1 TABLET BY MOUTH DAILY      Provider out of office. Patient Phone Number: [phone] (home)     Last appt: 2/7/2022   Next appt: Visit date not found    Last OARRS:   RX Monitoring 7/2/2019   Attestation -   Periodic Controlled Substance Monitoring Possible medication side effects, risk of tolerance/dependence & alternative treatments discussed.    Chronic Pain > 80 MEDD -     PDMP Monitoring:    Last PDMP Андрей as Reviewed [facility]):  Review User Review Instant Review Result   Alyssa Lugo 1/10/2020  1:03 PM Reviewed PDMP [1]     [facility]:   [facility] #[id number] [place] [id number]  [address] [facility] [address]  Phone: [phone] Fax: [phone] [place] [address] [address] [phone] Li Santoro [phone]  [place] [address] [address] [facility] [address]  Phone: [phone] Fax: [phone]

## 2022-09-29 DIAGNOSIS — F39 MOOD DISORDER (HCC): ICD-10-CM

## 2022-09-29 RX ORDER — TRAZODONE HYDROCHLORIDE 100 MG/1
TABLET ORAL
Qty: 90 TABLET | Refills: 1 | Status: SHIPPED | OUTPATIENT
Start: 2022-09-29

## 2022-10-22 DIAGNOSIS — M79.18 MYOFASCIAL PAIN: ICD-10-CM

## 2022-10-25 DIAGNOSIS — M79.18 MYOFASCIAL PAIN: ICD-10-CM

## 2022-10-25 RX ORDER — CYCLOBENZAPRINE HCL 5 MG
TABLET ORAL
Qty: 60 TABLET | Refills: 5 | OUTPATIENT
Start: 2022-10-25

## 2022-10-25 NOTE — TELEPHONE ENCOUNTER
----- Message from Johnsenaitgonzalo Melendez sent at 10/25/2022 12:49 PM EDT -----  Subject: Refill Request    QUESTIONS  Name of Medication? cyclobenzaprine (FLEXERIL) 5 MG tablet  Patient-reported dosage and instructions? 5mg 3 times daily as needed  How many days do you have left? 10  [facility]? [id number]  Pharmacy phone number (if available)? [phone]  ---------------------------------------------------------------------------  --------------  Anthony JACOBSON  What is the best way for the office to contact you? OK to leave message on   voicemail  Preferred Call Back Phone Number? [phone]  ---------------------------------------------------------------------------  --------------  SCRIPT ANSWERS  Relationship to Patient?  Self

## 2022-10-26 DIAGNOSIS — M79.18 MYOFASCIAL PAIN: ICD-10-CM

## 2022-10-26 RX ORDER — CYCLOBENZAPRINE HCL 5 MG
TABLET ORAL
Qty: 60 TABLET | Refills: 5 | Status: SHIPPED | OUTPATIENT
Start: 2022-10-26

## 2022-11-14 DIAGNOSIS — F39 MOOD DISORDER (HCC): ICD-10-CM

## 2022-11-14 RX ORDER — DESVENLAFAXINE 50 MG/1
TABLET, EXTENDED RELEASE ORAL
Qty: 30 TABLET | Refills: 5 | Status: SHIPPED | OUTPATIENT
Start: 2022-11-14

## 2022-11-14 NOTE — TELEPHONE ENCOUNTER
Requested Prescriptions     Pending Prescriptions Disp Refills    desvenlafaxine succinate (PRISTIQ) 50 MG TB24 extended release tablet [Pharmacy Med Name: DESVENLAFAXINE ER SUCCINATE 50MG T] 30 tablet 5     Sig: TAKE 1 TABLET BY MOUTH DAILY          Number: 30    Refills: 5    Last Office Visit: 2/7/2022     Next Office Visit: Visit date not found     Last Refill: 6/6/22    Last Labs: 1/15/21

## 2022-12-21 DIAGNOSIS — J30.1 ALLERGIC RHINITIS DUE TO POLLEN, UNSPECIFIED SEASONALITY: ICD-10-CM

## 2022-12-21 RX ORDER — LORATADINE 10 MG/1
10 TABLET ORAL DAILY
Qty: 90 TABLET | Refills: 0 | Status: SHIPPED | OUTPATIENT
Start: 2022-12-21

## 2022-12-29 RX ORDER — MELATONIN
Qty: 30 TABLET | Refills: 5 | Status: SHIPPED | OUTPATIENT
Start: 2022-12-29

## 2023-01-15 DIAGNOSIS — M79.18 MYOFASCIAL PAIN: ICD-10-CM

## 2023-01-16 RX ORDER — IBUPROFEN 800 MG/1
800 TABLET ORAL EVERY 8 HOURS PRN
Qty: 60 TABLET | Refills: 5 | OUTPATIENT
Start: 2023-01-16

## 2023-01-18 DIAGNOSIS — M79.18 MYOFASCIAL PAIN: ICD-10-CM

## 2023-01-18 DIAGNOSIS — F39 MOOD DISORDER (HCC): ICD-10-CM

## 2023-01-18 RX ORDER — IBUPROFEN 800 MG/1
800 TABLET ORAL EVERY 8 HOURS PRN
Qty: 60 TABLET | Refills: 0 | Status: SHIPPED | OUTPATIENT
Start: 2023-01-18

## 2023-01-18 RX ORDER — BUSPIRONE HYDROCHLORIDE 10 MG/1
10 TABLET ORAL DAILY
Qty: 30 TABLET | Refills: 0 | Status: SHIPPED | OUTPATIENT
Start: 2023-01-18

## 2023-01-18 NOTE — TELEPHONE ENCOUNTER
----- Message from Alisa Fajardo sent at 1/18/2023 11:35 AM EST -----  Subject: Refill Request    QUESTIONS  Name of Medication? busPIRone (BUSPAR) 10 MG tablet  Patient-reported dosage and instructions? one tablet daily   How many days do you have left? 2  [facility]? [id number]  Pharmacy phone number (if available)? [phone]  ---------------------------------------------------------------------------  --------------,  Name of Medication? ibuprofen (ADVIL;MOTRIN) 800 MG tablet  Patient-reported dosage and instructions? 1 tablet every 8 hrs   How many days do you have left? 2  [id number]  Pharmacy phone number (if available)? [phone]  Additional Information for Provider? pt. has f/up schedule 1/26/23  ---------------------------------------------------------------------------  --------------  CALL BACK INFO  What is the best way for the office to contact you? OK to leave message on   voicemail  Preferred Call Back Phone Number? [phone]  ---------------------------------------------------------------------------  --------------  SCRIPT ANSWERS  Relationship to Patient?  Self 

## 2023-01-26 ENCOUNTER — OFFICE VISIT (OUTPATIENT)
Dept: FAMILY MEDICINE CLINIC | Age: 33
End: 2023-01-26
Payer: MEDICAID

## 2023-01-26 VITALS
OXYGEN SATURATION: 99 % | DIASTOLIC BLOOD PRESSURE: 72 MMHG | WEIGHT: 140 LBS | TEMPERATURE: 98.4 F | HEART RATE: 91 BPM | SYSTOLIC BLOOD PRESSURE: 104 MMHG | BODY MASS INDEX: 20.67 KG/M2

## 2023-01-26 DIAGNOSIS — Z00.00 PREVENTATIVE HEALTH CARE: Primary | ICD-10-CM

## 2023-01-26 DIAGNOSIS — E55.9 VITAMIN D INSUFFICIENCY: ICD-10-CM

## 2023-01-26 DIAGNOSIS — Z23 NEED FOR VACCINATION: ICD-10-CM

## 2023-01-26 DIAGNOSIS — Z13.220 SCREENING FOR LIPID DISORDERS: ICD-10-CM

## 2023-01-26 DIAGNOSIS — Z13.29 SCREENING FOR THYROID DISORDER: ICD-10-CM

## 2023-01-26 DIAGNOSIS — M35.9 UNDIFFERENTIATED CONNECTIVE TISSUE DISEASE (HCC): ICD-10-CM

## 2023-01-26 PROCEDURE — 99395 PREV VISIT EST AGE 18-39: CPT | Performed by: NURSE PRACTITIONER

## 2023-01-26 PROCEDURE — 90471 IMMUNIZATION ADMIN: CPT | Performed by: NURSE PRACTITIONER

## 2023-01-26 PROCEDURE — 90674 CCIIV4 VAC NO PRSV 0.5 ML IM: CPT | Performed by: NURSE PRACTITIONER

## 2023-01-26 PROCEDURE — G8482 FLU IMMUNIZE ORDER/ADMIN: HCPCS | Performed by: NURSE PRACTITIONER

## 2023-01-26 ASSESSMENT — PATIENT HEALTH QUESTIONNAIRE - PHQ9
SUM OF ALL RESPONSES TO PHQ QUESTIONS 1-9: 0
2. FEELING DOWN, DEPRESSED OR HOPELESS: 0
1. LITTLE INTEREST OR PLEASURE IN DOING THINGS: 0
SUM OF ALL RESPONSES TO PHQ9 QUESTIONS 1 & 2: 0
SUM OF ALL RESPONSES TO PHQ QUESTIONS 1-9: 0

## 2023-01-26 NOTE — PROGRESS NOTES
Kanwal Sheriff  : 1990  Encounter date: 2023    This donovan 28 y.o. female who presents with  Chief Complaint   Patient presents with    Annual Exam       History of present illness:    HPI History and Physical      Kanwal Sheriff  YOB: 1990    Date of Service:  2023    Chief Complaint:   Kanwal Sheriff is a 28 y.o. female who  presents for physical examination. HPI: Pt is 28year old female for annual exam.  Due for labs. Pt with existing auto-immune disorder, controlled, pt not established with rheumatology. Pt is 5 weeks pregnant, ARGENIS 23. Established with OB/GYN. Reports having labs drawn but unable to access.     Wt Readings from Last 3 Encounters:   23 140 lb (63.5 kg)   22 148 lb (67.1 kg)   21 168 lb 3.2 oz (76.3 kg)     BP Readings from Last 3 Encounters:   23 104/72   22 110/84   21 110/87       Patient Active Problem List   Diagnosis    Mood disorder (HCC)    Acne    Myofascial pain    Neck swelling    History of sinus tachycardia    Tobacco use    Generalized anxiety disorder    Cigarette nicotine dependence with nicotine-induced disorder    Hypermobility arthralgia    Undifferentiated connective tissue disease ([facility] [address]       Preventive Care:  Health Maintenance   Topic Date Due    Varicella vaccine (1 of 2 - 2-dose childhood series) Never done    Pneumococcal 0-64 years Vaccine (1 - PCV) Never done    HIV screen  Never done    Hepatitis C screen  Never done    COVID-19 Vaccine (3 - Booster for Pfizer series) 2021    Cervical cancer screen  2022    Flu vaccine (1) 2022    Depression Screen  2023    DTaP/Tdap/Td vaccine (2 - Td or Tdap) 2028    Hepatitis A vaccine  Aged Out    Hib vaccine  Aged Out    Meningococcal (ACWY) vaccine  Aged Out      Hx abnormal PAP: no  Sexual activity: single partner, contraception - none   Self-breast exams: yes  Previous DEXA scan: no  Last eye exam: 2022  Exercise: treadmill 3 x week  Lipid panel: No results found for: CHOL, TRIG, HDL, LDLCALC, LDLDIRECT     Living will:No    Immunization History   Administered Date(s) Administered    COVID-19, PFIZER PURPLE top, DILUTE for use, (age 15 y+), 30mcg/0.3mL 08/29/2021, 09/19/2021    Influenza Virus Vaccine 12/05/2017    Influenza, AFLURIA (age 1 yrs+), FLUZONE, (age 10 mo+), MDV, 0.5mL 12/05/2017    Influenza, FLUARIX, FLULAVAL, FLUZONE (age 10 mo+) AND AFLURIA, (age 1 y+), PF, 0.5mL 12/19/2017    Tdap (Boostrix, Adacel) 02/14/2018       Allergies   Allergen Reactions    Meloxicam Other (See Comments)     Pt states it just knocked her out      Outpatient Medications Marked as Taking for the 1/26/23 encounter (Office Visit) with IGGY Jaramlilo - NP   Medication Sig Dispense Refill    busPIRone (BUSPAR) 10 MG tablet Take 1 tablet by mouth daily 30 tablet 0    vitamin D3 (CHOLECALCIFEROL) 25 MCG (1000 UT) TABS tablet TAKE 1 TABLET BY MOUTH DAILY 30 tablet 5    loratadine (CLARITIN) 10 MG tablet TAKE 1 TABLET BY MOUTH DAILY 90 tablet 0    desvenlafaxine succinate (PRISTIQ) 50 MG TB24 extended release tablet TAKE 1 TABLET BY MOUTH DAILY 30 tablet 5    cyclobenzaprine (FLEXERIL) 5 MG tablet TAKE 1 TABLET BY MOUTH THREE TIMES DAILY AS NEEDED FOR MUSCLE SPASMS 60 tablet 5    traZODone (DESYREL) 100 MG tablet TAKE 1 TABLET BY MOUTH EVERY NIGHT 90 tablet 1    vitamin E 400 UNIT capsule TAKE 1 CAPSULE BY MOUTH DAILY 30 capsule 5    Multiple Vitamin (DAILY-YOKASTA MULTIVITAMIN) TABS TAKE 1 TABLET BY MOUTH DAILY 30 tablet 5    ondansetron (ZOFRAN-ODT) 4 MG disintegrating tablet DISSOLVE 1 TABLET ON THE TONGUE EVERY 8 HOURS AS NEEDED FOR NAUSEA OR VOMITING 12 tablet 0       Past Medical History:   Diagnosis Date    Seasonal allergies      Past Surgical History:   Procedure Laterality Date    TONSILLECTOMY  2007     Family History   Problem Relation Age of Onset    Other Mother         mixed tissue disorder Social History     Socioeconomic History    Marital status: Single     Spouse name: Not on file    Number of children: Not on file    Years of education: Not on file    Highest education level: Not on file   Occupational History    Not on file   Tobacco Use    Smoking status: Every Day     Packs/day: 0.15     Types: Cigarettes    Smokeless tobacco: Never   Substance and Sexual Activity    Alcohol use: No    Drug use: Yes     Types: Marijuana Estil Catena)     Comment: Medical Marijuana    Sexual activity: Not on file   Other Topics Concern    Not on file   Social History Narrative    Not on file     Social Determinants of Health     Financial Resource Strain: Low Risk     Difficulty of Paying Living Expenses: Not hard at all   Food Insecurity: No Food Insecurity    Worried About Running Out of Food in the Last Year: Never true    [address] in the Last Year: Never true   Transportation Needs: Not on file   Physical Activity: Sufficiently Active    Days of Exercise per Week: 5 days    Minutes of Exercise per Session: 30 min   Stress: Not on file   Social Connections: Not on file   Intimate Partner Violence: Not At Risk    Fear of Current or Ex-Partner: No    Emotionally Abused: No    Physically Abused: No    Sexually Abused: No   Housing Stability: Not on file       Review of Systems:  A comprehensive review of systems was negative except for what was noted in the HPI. Physical Exam:   Vitals:    01/26/23 1336   BP: 104/72   Site: Left Upper Arm   Position: Sitting   Cuff Size: Medium Adult   Pulse: 91   Temp: 98.4 °F (36.9 °C)   TempSrc: Infrared   SpO2: 99%   Weight: 140 lb (63.5 kg)     Body mass index is 20.67 kg/m². Constitutional: She is oriented to person, place, and time. She appears well-developed and well-nourished. No distress. HEENT:   Head: Normocephalic and atraumatic.    Right Ear: Tympanic membrane, external ear and ear canal normal.   Left Ear: Tympanic membrane, external ear and ear canal normal.   Nose: Nose normal.   Mouth/Throat: Oropharynx is clear and moist, and mucous membranes are normal.  There is no cervical adenopathy. Eyes: Conjunctivae and extraocular motions are normal. Pupils are equal, round, and reactive to light. Neck: Neck supple. No JVD present. Carotid bruit is not present. No mass and no thyromegaly present. Cardiovascular: Normal rate, regular rhythm, normal heart sounds and intact distal pulses. Exam reveals no gallop and no friction rub. No murmur heard. Pulmonary/Chest: Effort normal and breath sounds normal. No respiratory distress. She has no wheezes, rhonchi or rales. Abdominal: Soft, non-tender. Bowel sounds and aorta are normal. She exhibits no organomegaly, mass or bruit. Breast exam:  not examined. Musculoskeletal: Normal range of motion, no synovitis. She exhibits no edema. Neurological: She is alert and oriented to person, place, and time. She has normal reflexes. No cranial nerve deficit. Coordination normal.   Skin: Skin is warm and dry. There is no rash or erythema. No suspicious lesions noted. Psychiatric: She has a normal mood and affect. Her speech is normal and behavior is normal. Judgment, cognition and memory are normal.     Assessment/Plan:    Gerry Hunter was seen today for annual exam.    Diagnoses and all orders for this visit:    Preventative health care  -     CBC with Auto Differential; Future  -     Comprehensive Metabolic Panel, Fasting; Future  -     Hepatitis C Antibody; Future  -     HIV Screen; Future    Screening for thyroid disorder  -     TSH with Reflex; Future    Screening for lipid disorders  -     Lipid, Fasting; Future    Vitamin D insufficiency  -     Vitamin D 25 Hydroxy;  Future    Undifferentiated connective tissue disease ([facility] [address]    Need for vaccination  -     Influenza, FLUCELVAX, (age 10 mo+), IM, Preservative Free, 0.5 mL           Current Outpatient Medications on File Prior to Visit   Medication Sig Dispense Refill busPIRone (BUSPAR) 10 MG tablet Take 1 tablet by mouth daily 30 tablet 0    vitamin D3 (CHOLECALCIFEROL) 25 MCG (1000 UT) TABS tablet TAKE 1 TABLET BY MOUTH DAILY 30 tablet 5    loratadine (CLARITIN) 10 MG tablet TAKE 1 TABLET BY MOUTH DAILY 90 tablet 0    desvenlafaxine succinate (PRISTIQ) 50 MG TB24 extended release tablet TAKE 1 TABLET BY MOUTH DAILY 30 tablet 5    cyclobenzaprine (FLEXERIL) 5 MG tablet TAKE 1 TABLET BY MOUTH THREE TIMES DAILY AS NEEDED FOR MUSCLE SPASMS 60 tablet 5    traZODone (DESYREL) 100 MG tablet TAKE 1 TABLET BY MOUTH EVERY NIGHT 90 tablet 1    vitamin E 400 UNIT capsule TAKE 1 CAPSULE BY MOUTH DAILY 30 capsule 5    Multiple Vitamin (DAILY-YOKASTA MULTIVITAMIN) TABS TAKE 1 TABLET BY MOUTH DAILY 30 tablet 5    ondansetron (ZOFRAN-ODT) 4 MG disintegrating tablet DISSOLVE 1 TABLET ON THE TONGUE EVERY 8 HOURS AS NEEDED FOR NAUSEA OR VOMITING 12 tablet 0     No current facility-administered medications on file prior to visit.       Allergies   Allergen Reactions    Meloxicam Other (See Comments)     Pt states it just knocked her out      Past Medical History:   Diagnosis Date    Seasonal allergies       Past Surgical History:   Procedure Laterality Date    TONSILLECTOMY  2007      Family History   Problem Relation Age of Onset    Other Mother         mixed tissue disorder      Social History     Tobacco Use    Smoking status: Every Day     Packs/day: 0.15     Types: Cigarettes    Smokeless tobacco: Never   Substance Use Topics    Alcohol use: No        Review of Systems    Objective:    /72 (Site: Left Upper Arm, Position: Sitting, Cuff Size: Medium Adult)   Pulse 91   Temp 98.4 °F (36.9 °C) (Infrared)   Wt 140 lb (63.5 kg)   LMP 02/11/2022   SpO2 99%   BMI 20.67 kg/m²   Weight: 140 lb (63.5 kg)     BP Readings from Last 3 Encounters:   01/26/23 104/72   02/24/22 110/84   01/14/21 110/87     Wt Readings from Last 3 Encounters:   01/26/23 140 lb (63.5 kg)   02/24/22 148 lb (67.1 kg) 01/14/21 168 lb 3.2 oz (76.3 kg)     BMI Readings from Last 3 Encounters:   01/26/23 20.67 kg/m²   02/24/22 21.86 kg/m²   01/14/21 24.84 kg/m²       Physical Exam    Assessment/Plan    1. Preventative health care  Advised routine dental and vision  Increased exercise, healthy diet   Advised living will  Follow up with OB/GYN  Requested previous labs to be sent  - CBC with Auto Differential; Future  - Comprehensive Metabolic Panel, Fasting; Future  - Hepatitis C Antibody; Future  - HIV Screen; Future    2. Screening for thyroid disorder  - TSH with Reflex; Future    3. Screening for lipid disorders  - Lipid, Fasting; Future    4. Vitamin D insufficiency  - Vitamin D 25 Hydroxy; Future    5. Undifferentiated connective tissue disease ([facility] [address]  controlled    6. Need for vaccination  Administered  - Influenza, FLUCELVAX, (age 10 mo+), IM, Preservative Free, 0.5 mL    Return in about 1 year (around 1/26/2024) for annual check up. This dictation was generated by voice recognition computer software. Although all attempts are made to edit the dictation for accuracy, there may be errors in the transcription that are not intended.

## 2023-02-18 DIAGNOSIS — F39 MOOD DISORDER (HCC): ICD-10-CM

## 2023-02-20 RX ORDER — BUSPIRONE HYDROCHLORIDE 10 MG/1
10 TABLET ORAL DAILY
Qty: 30 TABLET | Refills: 0 | Status: SHIPPED | OUTPATIENT
Start: 2023-02-20

## 2023-02-28 ENCOUNTER — OFFICE VISIT (OUTPATIENT)
Dept: FAMILY MEDICINE CLINIC | Age: 33
End: 2023-02-28
Payer: MEDICAID

## 2023-02-28 VITALS
BODY MASS INDEX: 20.73 KG/M2 | HEART RATE: 84 BPM | OXYGEN SATURATION: 99 % | TEMPERATURE: 98.2 F | WEIGHT: 140 LBS | HEIGHT: 69 IN

## 2023-02-28 DIAGNOSIS — F39 MOOD DISORDER (HCC): ICD-10-CM

## 2023-02-28 PROCEDURE — 4004F PT TOBACCO SCREEN RCVD TLK: CPT | Performed by: NURSE PRACTITIONER

## 2023-02-28 PROCEDURE — G8427 DOCREV CUR MEDS BY ELIG CLIN: HCPCS | Performed by: NURSE PRACTITIONER

## 2023-02-28 PROCEDURE — G8420 CALC BMI NORM PARAMETERS: HCPCS | Performed by: NURSE PRACTITIONER

## 2023-02-28 PROCEDURE — G8482 FLU IMMUNIZE ORDER/ADMIN: HCPCS | Performed by: NURSE PRACTITIONER

## 2023-02-28 PROCEDURE — 99213 OFFICE O/P EST LOW 20 MIN: CPT | Performed by: NURSE PRACTITIONER

## 2023-02-28 RX ORDER — DESVENLAFAXINE 50 MG/1
TABLET, EXTENDED RELEASE ORAL
Qty: 90 TABLET | Refills: 3 | Status: SHIPPED | OUTPATIENT
Start: 2023-02-28

## 2023-02-28 RX ORDER — BUSPIRONE HYDROCHLORIDE 10 MG/1
10 TABLET ORAL DAILY
Qty: 90 TABLET | Refills: 3 | Status: SHIPPED | OUTPATIENT
Start: 2023-02-28

## 2023-02-28 SDOH — ECONOMIC STABILITY: HOUSING INSECURITY
IN THE LAST 12 MONTHS, WAS THERE A TIME WHEN YOU DID NOT HAVE A STEADY PLACE TO SLEEP OR SLEPT IN A SHELTER (INCLUDING NOW)?: NO

## 2023-02-28 SDOH — ECONOMIC STABILITY: FOOD INSECURITY: WITHIN THE PAST 12 MONTHS, YOU WORRIED THAT YOUR FOOD WOULD RUN OUT BEFORE YOU GOT MONEY TO BUY MORE.: NEVER TRUE

## 2023-02-28 SDOH — ECONOMIC STABILITY: INCOME INSECURITY: HOW HARD IS IT FOR YOU TO PAY FOR THE VERY BASICS LIKE FOOD, HOUSING, MEDICAL CARE, AND HEATING?: NOT HARD AT ALL

## 2023-02-28 SDOH — ECONOMIC STABILITY: FOOD INSECURITY: WITHIN THE PAST 12 MONTHS, THE FOOD YOU BOUGHT JUST DIDN'T LAST AND YOU DIDN'T HAVE MONEY TO GET MORE.: NEVER TRUE

## 2023-02-28 ASSESSMENT — PATIENT HEALTH QUESTIONNAIRE - PHQ9
6. FEELING BAD ABOUT YOURSELF - OR THAT YOU ARE A FAILURE OR HAVE LET YOURSELF OR YOUR FAMILY DOWN: 0
2. FEELING DOWN, DEPRESSED OR HOPELESS: 0
8. MOVING OR SPEAKING SO SLOWLY THAT OTHER PEOPLE COULD HAVE NOTICED. OR THE OPPOSITE, BEING SO FIGETY OR RESTLESS THAT YOU HAVE BEEN MOVING AROUND A LOT MORE THAN USUAL: 0
10. IF YOU CHECKED OFF ANY PROBLEMS, HOW DIFFICULT HAVE THESE PROBLEMS MADE IT FOR YOU TO DO YOUR WORK, TAKE CARE OF THINGS AT HOME, OR GET ALONG WITH OTHER PEOPLE: 0
3. TROUBLE FALLING OR STAYING ASLEEP: 1
4. FEELING TIRED OR HAVING LITTLE ENERGY: 1
9. THOUGHTS THAT YOU WOULD BE BETTER OFF DEAD, OR OF HURTING YOURSELF: 0
SUM OF ALL RESPONSES TO PHQ QUESTIONS 1-9: 3
7. TROUBLE CONCENTRATING ON THINGS, SUCH AS READING THE NEWSPAPER OR WATCHING TELEVISION: 0
1. LITTLE INTEREST OR PLEASURE IN DOING THINGS: 0
SUM OF ALL RESPONSES TO PHQ QUESTIONS 1-9: 3
SUM OF ALL RESPONSES TO PHQ QUESTIONS 1-9: 3
5. POOR APPETITE OR OVEREATING: 1
SUM OF ALL RESPONSES TO PHQ QUESTIONS 1-9: 3
SUM OF ALL RESPONSES TO PHQ9 QUESTIONS 1 & 2: 0

## 2023-02-28 NOTE — PROGRESS NOTES
Annia Segura  : 1990  Encounter date: 2023    This donovan 28 y.o. female who presents with  Chief Complaint   Patient presents with    Discuss Labs    Medication Refill     Years worth of pristiq       History of present illness:    HPI PT is 28year old female for lab review, pt is currently 10 weeks pregnant, established with OB/GYN. Labs showed elevated LDL and TG, discussed correlation with pregnancy, all other labs NL, discussed TSH and T4 levels. Pt with anxiety and mood disorder, taking pristiq and buspar, well controlled. Pt previous gene site completed. OB/GYN aware patient taking medications, reviewed risk vs benefits. Discussed risk with pristiq for post delivery hemorrhage. Current Outpatient Medications on File Prior to Visit   Medication Sig Dispense Refill    vitamin D3 (CHOLECALCIFEROL) 25 MCG (1000 UT) TABS tablet TAKE 1 TABLET BY MOUTH DAILY 30 tablet 5    loratadine (CLARITIN) 10 MG tablet TAKE 1 TABLET BY MOUTH DAILY 90 tablet 0    cyclobenzaprine (FLEXERIL) 5 MG tablet TAKE 1 TABLET BY MOUTH THREE TIMES DAILY AS NEEDED FOR MUSCLE SPASMS 60 tablet 5    traZODone (DESYREL) 100 MG tablet TAKE 1 TABLET BY MOUTH EVERY NIGHT 90 tablet 1    vitamin E 400 UNIT capsule TAKE 1 CAPSULE BY MOUTH DAILY 30 capsule 5    Multiple Vitamin (DAILY-YOKASTA MULTIVITAMIN) TABS TAKE 1 TABLET BY MOUTH DAILY 30 tablet 5    ondansetron (ZOFRAN-ODT) 4 MG disintegrating tablet DISSOLVE 1 TABLET ON THE TONGUE EVERY 8 HOURS AS NEEDED FOR NAUSEA OR VOMITING 12 tablet 0     No current facility-administered medications on file prior to visit.       Allergies   Allergen Reactions    Meloxicam Other (See Comments)     Pt states it just knocked her out      Past Medical History:   Diagnosis Date    Seasonal allergies       Past Surgical History:   Procedure Laterality Date    TONSILLECTOMY  2007      Family History   Problem Relation Age of Onset    Other Mother         mixed tissue disorder      Social History     Tobacco Use    Smoking status: Every Day     Packs/day: 0.15     Types: Cigarettes    Smokeless tobacco: Never   Substance Use Topics    Alcohol use: No        Review of Systems    Objective:    Pulse 84   Temp 98.2 °F (36.8 °C) (Infrared)   Ht 5' 9\" (1.753 m)   Wt 140 lb (63.5 kg)   SpO2 99%   BMI 20.67 kg/m²   Weight: 140 lb (63.5 kg)     BP Readings from Last 3 Encounters:   01/26/23 104/72   02/24/22 110/84   01/14/21 110/87     Wt Readings from Last 3 Encounters:   02/28/23 140 lb (63.5 kg)   01/26/23 140 lb (63.5 kg)   02/24/22 148 lb (67.1 kg)     BMI Readings from Last 3 Encounters:   02/28/23 20.67 kg/m²   01/26/23 20.67 kg/m²   02/24/22 21.86 kg/m²       Physical Exam  Vitals reviewed. Constitutional:       Appearance: Normal appearance. She is well-developed. Pulmonary:      Effort: Pulmonary effort is normal.   Skin:     General: Skin is warm and dry. Neurological:      Mental Status: She is alert and oriented to person, place, and time. Psychiatric:         Mood and Affect: Mood normal.         Behavior: Behavior normal.         Thought Content: Thought content normal.         Judgment: Judgment normal.       Assessment/Plan    1. Mood disorder (HCC)  controlled  - desvenlafaxine succinate (PRISTIQ) 50 MG TB24 extended release tablet; TAKE 1 TABLET BY MOUTH DAILY  Dispense: 90 tablet; Refill: 3  - busPIRone (BUSPAR) 10 MG tablet; Take 1 tablet by mouth daily  Dispense: 90 tablet; Refill: 3      Return in about 1 year (around 2/28/2024) for annual check up. This dictation was generated by voice recognition computer software. Although all attempts are made to edit the dictation for accuracy, there may be errors in the transcription that are not intended.

## 2023-03-18 DIAGNOSIS — F39 MOOD DISORDER (HCC): ICD-10-CM

## 2023-03-20 RX ORDER — TRAZODONE HYDROCHLORIDE 100 MG/1
TABLET ORAL
Qty: 90 TABLET | Refills: 1 | OUTPATIENT
Start: 2023-03-20

## 2023-03-21 DIAGNOSIS — F39 MOOD DISORDER (HCC): ICD-10-CM

## 2023-03-22 RX ORDER — TRAZODONE HYDROCHLORIDE 100 MG/1
TABLET ORAL
Qty: 90 TABLET | Refills: 1 | OUTPATIENT
Start: 2023-03-22

## 2023-03-24 ENCOUNTER — TELEPHONE (OUTPATIENT)
Dept: FAMILY MEDICINE CLINIC | Age: 33
End: 2023-03-24

## 2023-03-24 NOTE — TELEPHONE ENCOUNTER
Patient called to find out why her Trazadone script hasn't been filled. Minor Sanchez Corn not able to take Trazadone during pregnancy. Patient is seeking help for her sleeping problems due to recent emotional distress.    Please advise

## 2023-03-27 DIAGNOSIS — M25.50 HYPERMOBILITY ARTHRALGIA: Primary | ICD-10-CM

## 2023-03-27 DIAGNOSIS — F39 MOOD DISORDER (HCC): ICD-10-CM

## 2023-03-27 DIAGNOSIS — G47.00 INSOMNIA, UNSPECIFIED TYPE: Primary | ICD-10-CM

## 2023-03-27 RX ORDER — TRAZODONE HYDROCHLORIDE 100 MG/1
100 TABLET ORAL NIGHTLY
Qty: 90 TABLET | Refills: 1 | Status: SHIPPED | OUTPATIENT
Start: 2023-03-27

## 2023-03-27 RX ORDER — VITAMIN E 268 MG
400 CAPSULE ORAL DAILY
Qty: 90 CAPSULE | Refills: 1 | Status: SHIPPED | OUTPATIENT
Start: 2023-03-27

## 2023-05-31 ENCOUNTER — TELEPHONE (OUTPATIENT)
Dept: FAMILY MEDICINE CLINIC | Age: 33
End: 2023-05-31

## 2023-05-31 DIAGNOSIS — M79.18 MYOFASCIAL PAIN: ICD-10-CM

## 2023-05-31 RX ORDER — CYCLOBENZAPRINE HCL 5 MG
TABLET ORAL
Qty: 90 TABLET | Refills: 0 | Status: SHIPPED | OUTPATIENT
Start: 2023-05-31

## 2023-05-31 NOTE — TELEPHONE ENCOUNTER
Patient requesting to schedule appointment due to re occurring hives. Scheduled for tomorrow Thurs 6/1/23 with Vivian Martinez CNP.

## 2023-06-01 ENCOUNTER — OFFICE VISIT (OUTPATIENT)
Dept: FAMILY MEDICINE CLINIC | Age: 33
End: 2023-06-01
Payer: MEDICAID

## 2023-06-01 VITALS
OXYGEN SATURATION: 99 % | SYSTOLIC BLOOD PRESSURE: 112 MMHG | TEMPERATURE: 98.6 F | HEART RATE: 91 BPM | DIASTOLIC BLOOD PRESSURE: 76 MMHG

## 2023-06-01 DIAGNOSIS — L30.9 DERMATITIS: ICD-10-CM

## 2023-06-01 DIAGNOSIS — R59.0 LYMPHADENOPATHY, CERVICAL: Primary | ICD-10-CM

## 2023-06-01 PROCEDURE — 99214 OFFICE O/P EST MOD 30 MIN: CPT | Performed by: NURSE PRACTITIONER

## 2023-06-01 PROCEDURE — 4004F PT TOBACCO SCREEN RCVD TLK: CPT | Performed by: NURSE PRACTITIONER

## 2023-06-01 PROCEDURE — G8420 CALC BMI NORM PARAMETERS: HCPCS | Performed by: NURSE PRACTITIONER

## 2023-06-01 PROCEDURE — G8427 DOCREV CUR MEDS BY ELIG CLIN: HCPCS | Performed by: NURSE PRACTITIONER

## 2023-06-01 RX ORDER — PREDNISONE 20 MG/1
20 TABLET ORAL 2 TIMES DAILY
Qty: 10 TABLET | Refills: 0 | Status: SHIPPED | OUTPATIENT
Start: 2023-06-01 | End: 2023-06-06

## 2023-06-01 NOTE — PROGRESS NOTES
Elgin Pepe  : 1990  Encounter date: 2023    This donovan 28 y.o. female who presents with  Chief Complaint   Patient presents with    Rash     BUE x1wk    Lymphadenopathy     Rt neck swollen lymph node, has been there awhile       History of present illness:    HPI Pt is 28year old female, 29 weeks pregnant. Pt with bilateral LE linear rashes. Denies known exposure to plant or chemical.  Pt has applied hydrocortisone with little relief. Pt also concerned about chronic R sided lymphadenopathy. Denies ST, denies tooth or ear pain. Nontender to touch. Pt has had previous imaging    CT soft tissue 2017  Mild prominence of the palatine tonsils without evidence of abscess formation or fluid collection identified    Xray neck  The prevertebral soft tissues are normal. The epiglottis is normal. The airway is patent. There   is masslike lesion in the submandibular region. This may be enlargement of the submandibular   gland.  No radiopaque foreign body is seen      Current Outpatient Medications on File Prior to Visit   Medication Sig Dispense Refill    cyclobenzaprine (FLEXERIL) 5 MG tablet TAKE 1 TABLET BY MOUTH THREE TIMES DAILY AS NEEDED FOR MUSCLE SPASMS 90 tablet 0    traZODone (DESYREL) 100 MG tablet Take 1 tablet by mouth nightly 90 tablet 1    vitamin E 400 UNIT capsule Take 1 capsule by mouth daily 90 capsule 1    desvenlafaxine succinate (PRISTIQ) 50 MG TB24 extended release tablet TAKE 1 TABLET BY MOUTH DAILY 90 tablet 3    busPIRone (BUSPAR) 10 MG tablet Take 1 tablet by mouth daily 90 tablet 3    vitamin D3 (CHOLECALCIFEROL) 25 MCG (1000 UT) TABS tablet TAKE 1 TABLET BY MOUTH DAILY 30 tablet 5    loratadine (CLARITIN) 10 MG tablet TAKE 1 TABLET BY MOUTH DAILY 90 tablet 0    Multiple Vitamin (DAILY-YOKASTA MULTIVITAMIN) TABS TAKE 1 TABLET BY MOUTH DAILY 30 tablet 5    ondansetron (ZOFRAN-ODT) 4 MG disintegrating tablet DISSOLVE 1 TABLET ON THE TONGUE EVERY 8 HOURS AS NEEDED FOR NAUSEA OR

## 2023-07-03 ENCOUNTER — HOSPITAL ENCOUNTER (OUTPATIENT)
Dept: CT IMAGING | Age: 33
Discharge: HOME | End: 2023-07-03

## 2023-07-03 DIAGNOSIS — R59.0 LYMPHADENOPATHY, CERVICAL: ICD-10-CM

## 2023-07-05 DIAGNOSIS — J30.1 ALLERGIC RHINITIS DUE TO POLLEN, UNSPECIFIED SEASONALITY: ICD-10-CM

## 2023-07-05 RX ORDER — LORATADINE 10 MG/1
10 TABLET ORAL DAILY
Qty: 90 TABLET | Refills: 0 | Status: SHIPPED | OUTPATIENT
Start: 2023-07-05

## 2023-07-18 DIAGNOSIS — M79.18 MYOFASCIAL PAIN: ICD-10-CM

## 2023-07-18 RX ORDER — CYCLOBENZAPRINE HCL 5 MG
TABLET ORAL
Qty: 90 TABLET | Refills: 0 | Status: SHIPPED | OUTPATIENT
Start: 2023-07-18

## 2023-07-18 RX ORDER — MELATONIN
Qty: 30 TABLET | Refills: 5 | Status: SHIPPED | OUTPATIENT
Start: 2023-07-18

## 2023-09-11 DIAGNOSIS — G47.00 INSOMNIA, UNSPECIFIED TYPE: ICD-10-CM

## 2023-09-12 RX ORDER — TRAZODONE HYDROCHLORIDE 100 MG/1
100 TABLET ORAL
Qty: 90 TABLET | Refills: 1 | Status: SHIPPED | OUTPATIENT
Start: 2023-09-12

## 2023-09-22 DIAGNOSIS — M25.50 HYPERMOBILITY ARTHRALGIA: ICD-10-CM

## 2023-09-22 RX ORDER — VITAMIN E 268 MG
CAPSULE ORAL DAILY
Qty: 90 CAPSULE | Refills: 1 | Status: SHIPPED | OUTPATIENT
Start: 2023-09-22

## 2023-09-22 NOTE — TELEPHONE ENCOUNTER
Medication:   Requested Prescriptions     Pending Prescriptions Disp Refills    vitamin E 400 UNIT capsule [Pharmacy Med Name: VITAMIN E 400IU CAPSULES] 90 capsule 1     Sig: TAKE 1 CAPSULE BY MOUTH EVERY DAY      Last Filled:      Patient Phone Number: [phone] (home)     Last appt: 6/1/2023   Next appt: Visit date not found    Last OARRS:       7/2/2019    10:33 PM   RX Monitoring   Periodic Controlled Substance Monitoring Possible medication side effects, risk of tolerance/dependence & alternative treatments discussed.      PDMP Monitoring:    Last PDMP Jameson Ortega as Reviewed [facility]):  Review User Review Instant Review Result   Cristela Ibrahim 1/26/2023  1:43 PM Reviewed PDMP [1]     [facility]:   [address] [address] [place]  [id number]  Phone: [phone] Fax: [phone]

## 2023-10-15 DIAGNOSIS — M79.18 MYOFASCIAL PAIN: ICD-10-CM

## 2023-10-16 RX ORDER — CYCLOBENZAPRINE HCL 5 MG
TABLET ORAL
Qty: 90 TABLET | Refills: 0 | Status: SHIPPED | OUTPATIENT
Start: 2023-10-16

## 2023-10-26 ENCOUNTER — OFFICE VISIT (OUTPATIENT)
Dept: FAMILY MEDICINE CLINIC | Age: 33
End: 2023-10-26
Payer: MEDICAID

## 2023-10-26 VITALS — OXYGEN SATURATION: 99 % | HEART RATE: 87 BPM | TEMPERATURE: 97.9 F

## 2023-10-26 DIAGNOSIS — B96.89 ACUTE BACTERIAL SINUSITIS: Primary | ICD-10-CM

## 2023-10-26 DIAGNOSIS — J01.90 ACUTE BACTERIAL SINUSITIS: Primary | ICD-10-CM

## 2023-10-26 PROCEDURE — 99213 OFFICE O/P EST LOW 20 MIN: CPT | Performed by: FAMILY MEDICINE

## 2023-10-26 PROCEDURE — G8420 CALC BMI NORM PARAMETERS: HCPCS | Performed by: FAMILY MEDICINE

## 2023-10-26 PROCEDURE — 4004F PT TOBACCO SCREEN RCVD TLK: CPT | Performed by: FAMILY MEDICINE

## 2023-10-26 PROCEDURE — G8428 CUR MEDS NOT DOCUMENT: HCPCS | Performed by: FAMILY MEDICINE

## 2023-10-26 PROCEDURE — G8484 FLU IMMUNIZE NO ADMIN: HCPCS | Performed by: FAMILY MEDICINE

## 2023-10-26 RX ORDER — GUAIFENESIN, PSEUDOEPHEDRINE HYDROCHLORIDE 600; 60 MG/1; MG/1
1 TABLET, EXTENDED RELEASE ORAL EVERY 12 HOURS
Qty: 20 TABLET | Refills: 1 | Status: SHIPPED | OUTPATIENT
Start: 2023-10-26 | End: 2023-11-15

## 2023-10-26 RX ORDER — AMOXICILLIN 875 MG/1
875 TABLET, COATED ORAL 2 TIMES DAILY
Qty: 20 TABLET | Refills: 0 | Status: SHIPPED | OUTPATIENT
Start: 2023-10-26 | End: 2023-11-05

## 2023-10-31 DIAGNOSIS — M79.18 MYOFASCIAL PAIN: ICD-10-CM

## 2023-10-31 RX ORDER — IBUPROFEN 800 MG/1
800 TABLET ORAL EVERY 8 HOURS PRN
Qty: 60 TABLET | Refills: 0 | Status: SHIPPED | OUTPATIENT
Start: 2023-10-31

## 2023-10-31 RX ORDER — IBUPROFEN 800 MG/1
800 TABLET ORAL EVERY 8 HOURS PRN
Qty: 60 TABLET | Refills: 0 | Status: SHIPPED | OUTPATIENT
Start: 2023-10-31 | End: 2023-10-31 | Stop reason: SDUPTHER

## 2023-11-09 ENCOUNTER — TELEPHONE (OUTPATIENT)
Dept: FAMILY MEDICINE CLINIC | Age: 33
End: 2023-11-09

## 2023-11-09 DIAGNOSIS — Z78.9 BREASTFEEDING (INFANT): Primary | ICD-10-CM

## 2023-11-09 NOTE — TELEPHONE ENCOUNTER
Pt called in regards to getting a prescription for breast milk bags. Pt stated that her insurance company covers breast milk bags to where the pt can get them for free if prescribed. Please advise. ..      [facility] [address] [facility], [address] [place] [address]

## 2023-12-01 DIAGNOSIS — M79.18 MYOFASCIAL PAIN: ICD-10-CM

## 2023-12-01 RX ORDER — CYCLOBENZAPRINE HCL 5 MG
TABLET ORAL
Qty: 90 TABLET | Refills: 0 | Status: SHIPPED | OUTPATIENT
Start: 2023-12-01

## 2023-12-01 NOTE — TELEPHONE ENCOUNTER
Medication:   Requested Prescriptions     Pending Prescriptions Disp Refills    cyclobenzaprine (FLEXERIL) 5 MG tablet [Pharmacy Med Name: CYCLOBENZAPRINE 5MG TABLETS] 90 tablet 0     Sig: TAKE 1 TABLET BY MOUTH THREE TIMES DAILY AS NEEDED FOR MUSCLE SPASMS      Last Filled:      Patient Phone Number: [phone] (home)     Last appt: 10/26/2023   Next appt: Visit date not found    Last OARRS:       7/2/2019    10:33 PM   RX Monitoring   Periodic Controlled Substance Monitoring Possible medication side effects, risk of tolerance/dependence & alternative treatments discussed.      PDMP Monitoring:    Last PDMP Newman Goltz as Reviewed [facility]):  Review User Review Instant Review Result   Mace Punt 1/26/2023  1:43 PM Reviewed PDMP [1]     [facility]:   [facility] [address] [place] [id number]  Phone: [phone] Fax: [phone]

## 2023-12-22 DIAGNOSIS — J01.90 ACUTE BACTERIAL SINUSITIS: Primary | ICD-10-CM

## 2023-12-22 DIAGNOSIS — B96.89 ACUTE BACTERIAL SINUSITIS: Primary | ICD-10-CM

## 2023-12-22 RX ORDER — AMOXICILLIN 875 MG/1
875 TABLET, COATED ORAL 2 TIMES DAILY
Qty: 20 TABLET | Refills: 0 | Status: SHIPPED | OUTPATIENT
Start: 2023-12-22 | End: 2024-01-01

## 2023-12-22 RX ORDER — GUAIFENESIN, PSEUDOEPHEDRINE HYDROCHLORIDE 600; 60 MG/1; MG/1
1 TABLET, EXTENDED RELEASE ORAL EVERY 12 HOURS
Qty: 20 TABLET | Refills: 0 | Status: SHIPPED | OUTPATIENT
Start: 2023-12-22 | End: 2024-01-01

## 2023-12-26 DIAGNOSIS — M79.18 MYOFASCIAL PAIN: ICD-10-CM

## 2023-12-27 RX ORDER — IBUPROFEN 800 MG/1
800 TABLET ORAL EVERY 8 HOURS PRN
Qty: 60 TABLET | Refills: 0 | Status: SHIPPED | OUTPATIENT
Start: 2023-12-27

## 2024-01-02 ENCOUNTER — OFFICE VISIT (OUTPATIENT)
Dept: FAMILY MEDICINE CLINIC | Age: 34
End: 2024-01-02
Payer: MEDICAID

## 2024-01-02 VITALS — OXYGEN SATURATION: 98 % | HEART RATE: 111 BPM | TEMPERATURE: 99.9 F

## 2024-01-02 DIAGNOSIS — J01.00 ACUTE NON-RECURRENT MAXILLARY SINUSITIS: Primary | ICD-10-CM

## 2024-01-02 PROCEDURE — 4004F PT TOBACCO SCREEN RCVD TLK: CPT | Performed by: NURSE PRACTITIONER

## 2024-01-02 PROCEDURE — 99213 OFFICE O/P EST LOW 20 MIN: CPT | Performed by: NURSE PRACTITIONER

## 2024-01-02 PROCEDURE — G8427 DOCREV CUR MEDS BY ELIG CLIN: HCPCS | Performed by: NURSE PRACTITIONER

## 2024-01-02 PROCEDURE — G8484 FLU IMMUNIZE NO ADMIN: HCPCS | Performed by: NURSE PRACTITIONER

## 2024-01-02 PROCEDURE — G8420 CALC BMI NORM PARAMETERS: HCPCS | Performed by: NURSE PRACTITIONER

## 2024-01-02 RX ORDER — AMOXICILLIN AND CLAVULANATE POTASSIUM 875; 125 MG/1; MG/1
1 TABLET, FILM COATED ORAL 2 TIMES DAILY
Qty: 20 TABLET | Refills: 0 | Status: SHIPPED | OUTPATIENT
Start: 2024-01-02 | End: 2024-01-12

## 2024-01-02 RX ORDER — METHYLPREDNISOLONE 4 MG/1
TABLET ORAL
Qty: 1 KIT | Refills: 0 | Status: SHIPPED | OUTPATIENT
Start: 2024-01-02 | End: 2024-01-08

## 2024-01-02 NOTE — PROGRESS NOTES
Kyra Headley  : 1990  Encounter date: 2024    This donovan 33 y.o. female who presents with  Chief Complaint   Patient presents with    Cough     Tani, fever, sore throat, body aches, chills ear pain, sweating x3days  Recently took abx and mucinex       History of present illness:    HPI Pt is 33 year old female with sinus congestion, fever, ST and bodyaches started 3 days ago.  Pt reports completing course of amoxicillin and mucinex with little relief 1 month ago, pt reports symptoms started 3 days ago.  Reports facial pressure and pain.  Pt has not had head CT completed from 6 months ago, will be getting scheduled.    Current Outpatient Medications on File Prior to Visit   Medication Sig Dispense Refill    ibuprofen (ADVIL;MOTRIN) 800 MG tablet TAKE 1 TABLET BY MOUTH EVERY 8 HOURS AS NEEDED FOR PAIN 60 tablet 0    cyclobenzaprine (FLEXERIL) 5 MG tablet TAKE 1 TABLET BY MOUTH THREE TIMES DAILY AS NEEDED FOR MUSCLE SPASMS 90 tablet 0    Feeding Supplies (BREASTMILK STORAGE BAGS) MISC 1 each by Does not apply route every 3-4 hours as needed (use to store breast milk every 3-6 hours) 100 each 4    vitamin E 400 UNIT capsule TAKE 1 CAPSULE BY MOUTH EVERY DAY 90 capsule 1    traZODone (DESYREL) 100 MG tablet TAKE 1 TABLET BY MOUTH EVERY NIGHT 90 tablet 1    cyclobenzaprine (FLEXERIL) 5 MG tablet TAKE 1 TABLET BY MOUTH THREE TIMES DAILY AS NEEDED FOR MUSCLE SPASMS 90 tablet 0    vitamin D3 (CHOLECALCIFEROL) 25 MCG (1000 UT) TABS tablet TAKE 1 TABLET BY MOUTH DAILY 30 tablet 5    loratadine (CLARITIN) 10 MG tablet TAKE 1 TABLET BY MOUTH DAILY 90 tablet 0    desvenlafaxine succinate (PRISTIQ) 50 MG TB24 extended release tablet TAKE 1 TABLET BY MOUTH DAILY 90 tablet 3    busPIRone (BUSPAR) 10 MG tablet Take 1 tablet by mouth daily 90 tablet 3    Multiple Vitamin (DAILY-YOKASTA MULTIVITAMIN) TABS TAKE 1 TABLET BY MOUTH DAILY 30 tablet 5    ondansetron (ZOFRAN-ODT) 4 MG disintegrating tablet DISSOLVE 1 TABLET ON

## 2024-01-13 DIAGNOSIS — M79.18 MYOFASCIAL PAIN: ICD-10-CM

## 2024-01-15 RX ORDER — CYCLOBENZAPRINE HCL 5 MG
TABLET ORAL
Qty: 270 TABLET | Refills: 0 | Status: SHIPPED | OUTPATIENT
Start: 2024-01-15

## 2024-02-03 DIAGNOSIS — M79.18 MYOFASCIAL PAIN: ICD-10-CM

## 2024-02-03 DIAGNOSIS — J30.1 ALLERGIC RHINITIS DUE TO POLLEN, UNSPECIFIED SEASONALITY: ICD-10-CM

## 2024-02-05 RX ORDER — IBUPROFEN 800 MG/1
800 TABLET ORAL EVERY 8 HOURS PRN
Qty: 180 TABLET | Refills: 1 | Status: SHIPPED | OUTPATIENT
Start: 2024-02-05

## 2024-02-05 RX ORDER — MELATONIN
Qty: 30 TABLET | Refills: 5 | Status: SHIPPED | OUTPATIENT
Start: 2024-02-05

## 2024-02-05 RX ORDER — LORATADINE 10 MG/1
10 TABLET ORAL DAILY
Qty: 90 TABLET | Refills: 1 | Status: SHIPPED | OUTPATIENT
Start: 2024-02-05

## 2024-02-20 ENCOUNTER — OFFICE VISIT (OUTPATIENT)
Dept: FAMILY MEDICINE CLINIC | Age: 34
End: 2024-02-20
Payer: MEDICAID

## 2024-02-20 VITALS — OXYGEN SATURATION: 98 % | TEMPERATURE: 97.7 F | HEART RATE: 83 BPM

## 2024-02-20 DIAGNOSIS — F39 MOOD DISORDER (HCC): ICD-10-CM

## 2024-02-20 DIAGNOSIS — H69.93 DYSFUNCTION OF BOTH EUSTACHIAN TUBES: Primary | ICD-10-CM

## 2024-02-20 PROCEDURE — 4004F PT TOBACCO SCREEN RCVD TLK: CPT | Performed by: NURSE PRACTITIONER

## 2024-02-20 PROCEDURE — G8484 FLU IMMUNIZE NO ADMIN: HCPCS | Performed by: NURSE PRACTITIONER

## 2024-02-20 PROCEDURE — G8420 CALC BMI NORM PARAMETERS: HCPCS | Performed by: NURSE PRACTITIONER

## 2024-02-20 PROCEDURE — 99213 OFFICE O/P EST LOW 20 MIN: CPT | Performed by: NURSE PRACTITIONER

## 2024-02-20 PROCEDURE — G8427 DOCREV CUR MEDS BY ELIG CLIN: HCPCS | Performed by: NURSE PRACTITIONER

## 2024-02-20 RX ORDER — DESVENLAFAXINE SUCCINATE 50 MG/1
TABLET, EXTENDED RELEASE ORAL
Qty: 90 TABLET | Refills: 3 | Status: SHIPPED | OUTPATIENT
Start: 2024-02-20

## 2024-02-20 RX ORDER — FLUTICASONE PROPIONATE 50 MCG
2 SPRAY, SUSPENSION (ML) NASAL DAILY
Qty: 16 G | Refills: 0 | Status: SHIPPED | OUTPATIENT
Start: 2024-02-20

## 2024-02-20 RX ORDER — METHYLPREDNISOLONE 4 MG/1
TABLET ORAL
Qty: 1 KIT | Refills: 0 | Status: SHIPPED | OUTPATIENT
Start: 2024-02-20 | End: 2024-02-26

## 2024-02-20 ASSESSMENT — PATIENT HEALTH QUESTIONNAIRE - PHQ9
2. FEELING DOWN, DEPRESSED OR HOPELESS: 0
SUM OF ALL RESPONSES TO PHQ QUESTIONS 1-9: 0
1. LITTLE INTEREST OR PLEASURE IN DOING THINGS: 0
SUM OF ALL RESPONSES TO PHQ QUESTIONS 1-9: 0
SUM OF ALL RESPONSES TO PHQ QUESTIONS 1-9: 0
SUM OF ALL RESPONSES TO PHQ9 QUESTIONS 1 & 2: 0
SUM OF ALL RESPONSES TO PHQ QUESTIONS 1-9: 0

## 2024-02-20 NOTE — PROGRESS NOTES
Diagnosis Date    Seasonal allergies       Past Surgical History:   Procedure Laterality Date    TONSILLECTOMY  2007      Family History   Problem Relation Age of Onset    Other Mother         mixed tissue disorder      Social History     Tobacco Use    Smoking status: Every Day     Current packs/day: 0.15     Types: Cigarettes    Smokeless tobacco: Never   Substance Use Topics    Alcohol use: No        Review of Systems    Objective:    Pulse 83   Temp 97.7 °F (36.5 °C) (Infrared)   SpO2 98%         BP Readings from Last 3 Encounters:   06/01/23 112/76   01/26/23 104/72   02/24/22 110/84     Wt Readings from Last 3 Encounters:   02/28/23 63.5 kg (140 lb)   01/26/23 63.5 kg (140 lb)   02/24/22 67.1 kg (148 lb)     BMI Readings from Last 3 Encounters:   02/28/23 20.67 kg/m²   01/26/23 20.67 kg/m²   02/24/22 21.86 kg/m²       Physical Exam  Vitals reviewed.   Constitutional:       Appearance: Normal appearance. She is well-developed.   HENT:      Right Ear: Ear canal normal. Decreased hearing noted. Tenderness present. Tympanic membrane is retracted.      Left Ear: Ear canal normal. Decreased hearing noted. Tenderness present. Tympanic membrane is retracted.      Nose: Congestion present. No rhinorrhea.      Mouth/Throat:      Pharynx: Oropharynx is clear.   Cardiovascular:      Rate and Rhythm: Normal rate and regular rhythm.      Pulses: Normal pulses.      Heart sounds: Normal heart sounds. No murmur heard.  Pulmonary:      Effort: Pulmonary effort is normal.      Breath sounds: Normal breath sounds.   Skin:     General: Skin is warm and dry.   Neurological:      Mental Status: She is alert and oriented to person, place, and time.   Psychiatric:         Mood and Affect: Mood normal.         Assessment/Plan    1. Dysfunction of both eustachian tubes  Suggested OTC decongestant  - fluticasone (FLONASE) 50 MCG/ACT nasal spray; 2 sprays by Each Nostril route daily  Dispense: 16 g; Refill: 0  - methylPREDNISolone

## 2024-02-26 ENCOUNTER — TELEPHONE (OUTPATIENT)
Dept: FAMILY MEDICINE CLINIC | Age: 34
End: 2024-02-26

## 2024-02-26 DIAGNOSIS — H69.93 DYSFUNCTION OF BOTH EUSTACHIAN TUBES: Primary | ICD-10-CM

## 2024-02-26 NOTE — TELEPHONE ENCOUNTER
Pt called stating was here a week ago, she finished her meds that were prescribed. Her appointment for the ear doctor is the . She started crying she stated her ears are in a lot of pain, she can not bare it. She doesn't know what to do?...    She would like a callback   Kyra's callback # [phone]    She also stated she is trying to be a mom to a  & 5 yr old. Please help.    Please advise...

## 2024-02-29 RX ORDER — CETIRIZINE HYDROCHLORIDE 10 MG/1
10 TABLET ORAL DAILY
Qty: 90 TABLET | Refills: 3 | Status: SHIPPED | OUTPATIENT
Start: 2024-02-29

## 2024-02-29 NOTE — TELEPHONE ENCOUNTER
Patient continues with persistent bilateral ear discomfort.  She has been using Flonase and Claritin.  She finished the Medrol Dosepak with some improvement.  She is here with her son for an office visit.  Checked ears. TM with clear fluid line.  No apparent infection. Recommended to continue with Flonase. Switch Claritin to Zyrtec.  Monitor for worsening symptoms.

## 2024-03-18 ENCOUNTER — OFFICE VISIT (OUTPATIENT)
Dept: ENT CLINIC | Age: 34
End: 2024-03-18
Payer: MEDICAID

## 2024-03-18 VITALS
OXYGEN SATURATION: 98 % | BODY MASS INDEX: 20.88 KG/M2 | HEIGHT: 69 IN | WEIGHT: 141 LBS | TEMPERATURE: 97.5 F | SYSTOLIC BLOOD PRESSURE: 103 MMHG | HEART RATE: 83 BPM | DIASTOLIC BLOOD PRESSURE: 72 MMHG

## 2024-03-18 DIAGNOSIS — J32.9 CHRONIC SINUSITIS, UNSPECIFIED LOCATION: Primary | ICD-10-CM

## 2024-03-18 DIAGNOSIS — J30.9 ALLERGIC RHINITIS, UNSPECIFIED SEASONALITY, UNSPECIFIED TRIGGER: ICD-10-CM

## 2024-03-18 DIAGNOSIS — M26.623 BILATERAL TEMPOROMANDIBULAR JOINT PAIN: ICD-10-CM

## 2024-03-18 DIAGNOSIS — H69.93 DYSFUNCTION OF BOTH EUSTACHIAN TUBES: ICD-10-CM

## 2024-03-18 DIAGNOSIS — J34.3 HYPERTROPHY OF BOTH INFERIOR NASAL TURBINATES: ICD-10-CM

## 2024-03-18 DIAGNOSIS — H93.13 TINNITUS OF BOTH EARS: ICD-10-CM

## 2024-03-18 PROCEDURE — G8420 CALC BMI NORM PARAMETERS: HCPCS | Performed by: STUDENT IN AN ORGANIZED HEALTH CARE EDUCATION/TRAINING PROGRAM

## 2024-03-18 PROCEDURE — G8484 FLU IMMUNIZE NO ADMIN: HCPCS | Performed by: STUDENT IN AN ORGANIZED HEALTH CARE EDUCATION/TRAINING PROGRAM

## 2024-03-18 PROCEDURE — G8427 DOCREV CUR MEDS BY ELIG CLIN: HCPCS | Performed by: STUDENT IN AN ORGANIZED HEALTH CARE EDUCATION/TRAINING PROGRAM

## 2024-03-18 PROCEDURE — 4004F PT TOBACCO SCREEN RCVD TLK: CPT | Performed by: STUDENT IN AN ORGANIZED HEALTH CARE EDUCATION/TRAINING PROGRAM

## 2024-03-18 PROCEDURE — 99203 OFFICE O/P NEW LOW 30 MIN: CPT | Performed by: STUDENT IN AN ORGANIZED HEALTH CARE EDUCATION/TRAINING PROGRAM

## 2024-03-18 RX ORDER — FLUTICASONE PROPIONATE 50 MCG
2 SPRAY, SUSPENSION (ML) NASAL DAILY
Qty: 48 G | Refills: 1 | Status: SHIPPED | OUTPATIENT
Start: 2024-03-18

## 2024-03-18 NOTE — PATIENT INSTRUCTIONS
Temporomandibular Joint (TMJ) Pain:    - Recommend taking NSAID (such as Ibuprofen, Advil, Aleve, Naproxen - not Tylenol) as needed for TMJ associated ear pain.  For more severe longer-lasting pain consider taking NSAIDs multiple times a day for 2 to 3 days to help break inflammatory cycle.  - Start conservative measure as discussed including no gum chewing, eating a softer diet, cutting bigger and tougher bites into smaller pieces, warm compresses to the affected side, self massages to the affected side.  - Consider use of mouthguard to prevent nocturnal bruxism (teeth grinding/clenching).  A dentist may be able to provide you with a custom-made mouthguard or you can obtain one over-the-counter that you mold yourself at any pharmacy.

## 2024-03-18 NOTE — PROGRESS NOTES
[facility]  DIVISION OF OTOLARYNGOLOGY- HEAD & NECK SURGERY  NEW PATIENT HISTORY AND PHYSICAL NOTE      Patient Name: Kyra Headley  Medical Record Number:  [id number]  Primary Care Physician:  Gala Shea APRN - NP    ChiefComplaint     Chief Complaint   Patient presents with    Ear Problem     Sore throat, nasal congestion, pressure in ears,        History of Present Illness     Kyra Headley is an 33 y.o. female presenting with sinus and ear issues since September 2023.  She endorses significant pain and popping in both of her ears, yellow mucopurulent drainage, midface nasal pressure.  She has had constant bilateral ear pain that fluctuates in severity.  She has been treated with 2 courses of Medrol Dosepak's which only helped temporarily.  She was also on at least 1 round of antibiotics including amoxicillin.  Currently on Flonase and Zyrtec, recently switched from Claritin to Zyrtec.  Also taking Sudafed.    She does have environmental allergies worse in the spring months.  No history of sinonasal or otologic surgery.  Sense of smell is intact.    Denies recent acute hearing. Intermittent bilateral tinnitus.  No otorrhea.  No history of chronic ear infections.  No history of otologic surgery.  No family history of early onset hearing loss.  + loud noise exposures - concerts. Denies vertigo.      Past Medical History     Past Medical History:   Diagnosis Date    Seasonal allergies        Past Surgical History     Past Surgical History:   Procedure Laterality Date    TONSILLECTOMY  2007       Family History     Family History   Problem Relation Age of Onset    Other Mother         mixed tissue disorder       Social History     Social History     Tobacco Use    Smoking status: Every Day     Current packs/day: 0.15     Types: Cigarettes    Smokeless tobacco: Never   Substance Use Topics    Alcohol use: No    Drug use: Yes     Types: Marijuana (Weed)     Comment: Medical Marijuana

## 2024-03-19 DIAGNOSIS — G47.00 INSOMNIA, UNSPECIFIED TYPE: ICD-10-CM

## 2024-03-19 RX ORDER — TRAZODONE HYDROCHLORIDE 100 MG/1
100 TABLET ORAL
Qty: 90 TABLET | Refills: 1 | Status: SHIPPED | OUTPATIENT
Start: 2024-03-19

## 2024-03-24 ENCOUNTER — OFFICE VISIT (OUTPATIENT)
Age: 34
End: 2024-03-24

## 2024-03-24 VITALS
HEART RATE: 87 BPM | SYSTOLIC BLOOD PRESSURE: 107 MMHG | OXYGEN SATURATION: 98 % | TEMPERATURE: 97.9 F | HEIGHT: 70 IN | WEIGHT: 143.2 LBS | BODY MASS INDEX: 20.5 KG/M2 | DIASTOLIC BLOOD PRESSURE: 74 MMHG | RESPIRATION RATE: 20 BRPM

## 2024-03-24 DIAGNOSIS — J01.00 ACUTE MAXILLARY SINUSITIS, RECURRENCE NOT SPECIFIED: Primary | ICD-10-CM

## 2024-03-24 RX ORDER — AZITHROMYCIN 250 MG/1
TABLET, FILM COATED ORAL
Qty: 6 TABLET | Refills: 0 | Status: SHIPPED | OUTPATIENT
Start: 2024-03-24 | End: 2024-04-03

## 2024-03-26 DIAGNOSIS — F39 MOOD DISORDER (HCC): ICD-10-CM

## 2024-03-26 RX ORDER — BUSPIRONE HYDROCHLORIDE 10 MG/1
10 TABLET ORAL DAILY
Qty: 90 TABLET | Refills: 3 | Status: SHIPPED | OUTPATIENT
Start: 2024-03-26

## 2024-04-12 ENCOUNTER — HOSPITAL ENCOUNTER (OUTPATIENT)
Dept: CT IMAGING | Age: 34
Discharge: HOME | End: 2024-04-12
Attending: STUDENT IN AN ORGANIZED HEALTH CARE EDUCATION/TRAINING PROGRAM
Payer: MEDICAID

## 2024-04-12 DIAGNOSIS — J32.9 CHRONIC SINUSITIS, UNSPECIFIED LOCATION: ICD-10-CM

## 2024-04-12 PROCEDURE — 70486 CT MAXILLOFACIAL W/O DYE: CPT

## 2024-04-22 ENCOUNTER — OFFICE VISIT (OUTPATIENT)
Dept: ENT CLINIC | Age: 34
End: 2024-04-22
Payer: MEDICAID

## 2024-04-22 ENCOUNTER — PROCEDURE VISIT (OUTPATIENT)
Dept: AUDIOLOGY | Age: 34
End: 2024-04-22
Payer: MEDICAID

## 2024-04-22 VITALS
HEIGHT: 70 IN | WEIGHT: 144 LBS | HEART RATE: 80 BPM | DIASTOLIC BLOOD PRESSURE: 69 MMHG | BODY MASS INDEX: 20.62 KG/M2 | OXYGEN SATURATION: 97 % | SYSTOLIC BLOOD PRESSURE: 106 MMHG | TEMPERATURE: 97.5 F

## 2024-04-22 DIAGNOSIS — H93.8X3 PRESSURE SENSATION IN BOTH EARS: Primary | ICD-10-CM

## 2024-04-22 DIAGNOSIS — H93.13 TINNITUS OF BOTH EARS: ICD-10-CM

## 2024-04-22 DIAGNOSIS — Z01.10 ENCOUNTER FOR EXAMINATION OF EARS AND HEARING WITHOUT ABNORMAL FINDINGS: ICD-10-CM

## 2024-04-22 DIAGNOSIS — J30.9 ALLERGIC RHINITIS, UNSPECIFIED SEASONALITY, UNSPECIFIED TRIGGER: Primary | ICD-10-CM

## 2024-04-22 DIAGNOSIS — M26.623 BILATERAL TEMPOROMANDIBULAR JOINT PAIN: ICD-10-CM

## 2024-04-22 DIAGNOSIS — H92.02 LEFT EAR PAIN: ICD-10-CM

## 2024-04-22 DIAGNOSIS — J34.3 HYPERTROPHY OF BOTH INFERIOR NASAL TURBINATES: ICD-10-CM

## 2024-04-22 PROCEDURE — 99214 OFFICE O/P EST MOD 30 MIN: CPT | Performed by: STUDENT IN AN ORGANIZED HEALTH CARE EDUCATION/TRAINING PROGRAM

## 2024-04-22 PROCEDURE — 92557 COMPREHENSIVE HEARING TEST: CPT | Performed by: AUDIOLOGIST

## 2024-04-22 PROCEDURE — 92567 TYMPANOMETRY: CPT | Performed by: AUDIOLOGIST

## 2024-04-22 PROCEDURE — 4004F PT TOBACCO SCREEN RCVD TLK: CPT | Performed by: STUDENT IN AN ORGANIZED HEALTH CARE EDUCATION/TRAINING PROGRAM

## 2024-04-22 PROCEDURE — G8428 CUR MEDS NOT DOCUMENT: HCPCS | Performed by: STUDENT IN AN ORGANIZED HEALTH CARE EDUCATION/TRAINING PROGRAM

## 2024-04-22 PROCEDURE — G8420 CALC BMI NORM PARAMETERS: HCPCS | Performed by: STUDENT IN AN ORGANIZED HEALTH CARE EDUCATION/TRAINING PROGRAM

## 2024-04-22 NOTE — PROGRESS NOTES
Kyra Headley   1990, 33 y.o. female   [id number]       Referring Provider: Mandeep Zamudio DO  Referral Type: Referring provider encounter note    Reason for Visit: Evaluation of suspected change in hearing, tinnitus, or balance.      ADULT AUDIOLOGIC EVALUATION      Kyra Headley is a 33 y.o. female seen today, 4/22/2024, for an initial audiologic evaluation.      AUDIOLOGIC AND OTHER PERTINENT MEDICAL HISTORY:        Kyra Headley noted ear fullness bilaterally, feels her symptoms have somewhat improved since taking antibiotics; has had some dull pain in left ear last week, but feels better; feels she is hearing well at this time; some history of noise exposure from concerts.      Kyra Headley denied otalgia, otorrhea, tinnitus, and dizziness.    IMPRESSIONS:       Today's results are consistent with hearing sensitivity within normal limits with normal middle ear function and excellent word recognition for both ears.  Discussed safe listening levels.  Follow medical recommendations from Dr. Zamudio.    ASSESSMENT AND FINDINGS:       Otoscopy revealed: Clear ear canals bilaterally      RIGHT EAR:  Hearing Sensitivity: Within normal limits.  Speech Recognition Threshold: 10 dBHL  Word Recognition: Excellent (100%), based on NU-6 Ordered-by-Difficulty 10-word list at 45 dBHL using recorded speech stimuli.    Tympanometry: Normal peak pressure and compliance, Type A tympanogram, consistent with normal middle ear function.      LEFT EAR:  Hearing Sensitivity: Within normal limits.  Speech Recognition Threshold: 10 dBHL  Word Recognition: Excellent (100%), based on NU-6 Ordered-by-Difficulty 10-word list at 45 dBHL using recorded speech stimuli.    Tympanometry: Normal peak pressure and compliance, Type A tympanogram, consistent with normal middle ear function.      Reliability: Good  Transducer: Inserts    See scanned audiogram dated 4/22/2024 for results.      PATIENT EDUCATION:

## 2024-04-22 NOTE — PROGRESS NOTES
hypertrophy.  Nasal septum deviated to the right.  EARS: Normal external appearance; on portable otomicroscopy:     -Ad: External auditory canal without stenosis, tympanic membrane clear, no middle ear effusions or retractions     -As: External auditory canal without stenosis, tympanic membrane clear, no middle ear effusions or retractions  FACE: HB 1/6 bilaterally, symmetric appearing, sensation equal bilaterally  ORAL CAVITY: No masses or lesions visualized or palpated, uvula is midline, moist mucous membranes, no oropharyngeal masses or oropharyngeal obstruction  NECK: Normal range of motion, no thyromegaly, trachea is midline, no palpable lymphadenopathy or neck masses, no crepitus  CHEST: Normal respiratory effort, breathing comfortably, no retractions  SKIN: No rashes, normal appearing skin, no evidence of skin lesions/tumors  NEURO: Cranial Nerves 2, 3, 4, 5, 6, 7, 11, 12 grossly intact bilaterally     I have performed a head and neck physical exam personally or was physically present during the key or critical portions of the service.    Data/Imaging Review        Media Information      Document Information    [facility] Clinical: Audiology   Audiogram and Tympanogram 4/22/2024 04/22/2024   Attached To:   Appointment on 4/22/24 with Viri Titus AuD   Source Information    Viri Titus AuD  [facility] Audiology     Narrative & Impression  EXAMINATION:  CT OF THE SINUS WITHOUT CONTRAST 4/12/2024 12:51 pm     TECHNIQUE:  CT of the sinuses was performed without the administration of intravenous  contrast. Multiplanar reformatted images are provided for review. Automated  exposure control, iterative reconstruction, and/or weight based adjustment of  the mA/kV was utilized to reduce the radiation dose to as low as reasonably  achievable.     COMPARISON:  09/10/2010     HISTORY:  ORDERING SYSTEM PROVIDED HISTORY: Chronic sinusitis, unspecified location  TECHNOLOGIST PROVIDED HISTORY:  Reason

## 2024-04-22 NOTE — PATIENT INSTRUCTIONS
stores, they can be worn time and time again and are great if you need to take your hearing protection off frequently.  Ear plugs are often made of foam or soft silicone.  The foam ones are designed for one-time use, while silicone ear plugs may be used multiple times.  There are also \"filtered\" ear plugs that help provide even attenuation of the sound across all frequencies.  These are great for listening to music or going to concerts, and allow for better understanding of speech in louder environments.  They can be purchased at music stores or online retailers (search \"Ety Plugs\" or \"filtered ear plugs\"), or custom earmolds can be made with an audiologist.    There are \"custom\" hearing protection devices that you can further discuss with your audiologist based on your specific needs, if desired.        Exposure to these sounds may cause permanent damage to your hearing.  If you suspect your hearing has changed, it is recommended that you have your hearing tested by your audiologist.

## 2024-05-05 DIAGNOSIS — M79.18 MYOFASCIAL PAIN: ICD-10-CM

## 2024-05-06 RX ORDER — CYCLOBENZAPRINE HCL 5 MG
TABLET ORAL
Qty: 270 TABLET | Refills: 0 | Status: SHIPPED | OUTPATIENT
Start: 2024-05-06

## 2024-05-28 ENCOUNTER — TELEPHONE (OUTPATIENT)
Dept: FAMILY MEDICINE CLINIC | Age: 34
End: 2024-05-28

## 2024-05-28 DIAGNOSIS — G47.00 INSOMNIA, UNSPECIFIED TYPE: ICD-10-CM

## 2024-05-28 RX ORDER — TRAZODONE HYDROCHLORIDE 100 MG/1
100 TABLET ORAL NIGHTLY
Qty: 90 TABLET | Refills: 1 | Status: SHIPPED | OUTPATIENT
Start: 2024-05-28

## 2024-05-28 NOTE — TELEPHONE ENCOUNTER
Called Pt pharmacy, pharmacy tech states that insurance won't pay for it till tomorrow but pt can get med if she wants to pay out of pocket.  
Called Pt, states she has enough meds to last a week so is ok with waiting until tomorrow to pic up rx.  
Patient is asking if she can get an early refill on her Trazodone, in April she took an extra half of a pill sometimes b/c she was off her sleep schedule due to her teething baby.  She is back to taking her normal dose, but now she will run out in a couple days.    Ray harding ff  
No

## 2024-06-03 ENCOUNTER — OFFICE VISIT (OUTPATIENT)
Dept: FAMILY MEDICINE CLINIC | Age: 34
End: 2024-06-03
Payer: MEDICAID

## 2024-06-03 VITALS
DIASTOLIC BLOOD PRESSURE: 78 MMHG | OXYGEN SATURATION: 99 % | BODY MASS INDEX: 19.66 KG/M2 | SYSTOLIC BLOOD PRESSURE: 112 MMHG | WEIGHT: 137 LBS | HEART RATE: 89 BPM | TEMPERATURE: 97.4 F

## 2024-06-03 DIAGNOSIS — E78.00 ELEVATED LDL CHOLESTEROL LEVEL: ICD-10-CM

## 2024-06-03 DIAGNOSIS — Z13.29 SCREENING FOR THYROID DISORDER: ICD-10-CM

## 2024-06-03 DIAGNOSIS — J01.11 ACUTE RECURRENT FRONTAL SINUSITIS: ICD-10-CM

## 2024-06-03 DIAGNOSIS — Z00.00 PREVENTATIVE HEALTH CARE: Primary | ICD-10-CM

## 2024-06-03 DIAGNOSIS — Z00.00 PREVENTATIVE HEALTH CARE: ICD-10-CM

## 2024-06-03 PROBLEM — M35.9 UNDIFFERENTIATED CONNECTIVE TISSUE DISEASE (HCC): Status: RESOLVED | Noted: 2019-04-25 | Resolved: 2024-06-03

## 2024-06-03 PROCEDURE — 99395 PREV VISIT EST AGE 18-39: CPT | Performed by: NURSE PRACTITIONER

## 2024-06-03 RX ORDER — AZITHROMYCIN 250 MG/1
TABLET, FILM COATED ORAL
Qty: 6 TABLET | Refills: 0 | Status: SHIPPED | OUTPATIENT
Start: 2024-06-03 | End: 2024-06-13

## 2024-06-03 SDOH — ECONOMIC STABILITY: FOOD INSECURITY: WITHIN THE PAST 12 MONTHS, YOU WORRIED THAT YOUR FOOD WOULD RUN OUT BEFORE YOU GOT MONEY TO BUY MORE.: NEVER TRUE

## 2024-06-03 SDOH — ECONOMIC STABILITY: FOOD INSECURITY: WITHIN THE PAST 12 MONTHS, THE FOOD YOU BOUGHT JUST DIDN'T LAST AND YOU DIDN'T HAVE MONEY TO GET MORE.: NEVER TRUE

## 2024-06-03 SDOH — ECONOMIC STABILITY: INCOME INSECURITY: HOW HARD IS IT FOR YOU TO PAY FOR THE VERY BASICS LIKE FOOD, HOUSING, MEDICAL CARE, AND HEATING?: NOT HARD AT ALL

## 2024-06-03 NOTE — PROGRESS NOTES
person, place, and time. She has normal reflexes. No cranial nerve deficit. Coordination normal.   Skin: Skin is warm and dry. There is no rash or erythema.  No suspicious lesions noted.   Psychiatric: She has a normal mood and affect. Her speech is normal and behavior is normal. Judgment, cognition and memory are normal.     Assessment/Plan:    Kyra was seen today for annual exam.    Diagnoses and all orders for this visit:    Preventative health care  -     CBC with Auto Differential; Future  -     Comprehensive Metabolic Panel, Fasting; Future    Elevated LDL cholesterol level  -     Lipid, Fasting; Future    Screening for thyroid disorder  -     TSH with Reflex; Future    Acute recurrent frontal sinusitis  -     azithromycin (ZITHROMAX) 250 MG tablet; 500mg on day 1 followed by 250mg on days 2 - 5            Current Outpatient Medications on File Prior to Visit   Medication Sig Dispense Refill    traZODone (DESYREL) 100 MG tablet Take 1 tablet by mouth nightly 90 tablet 1    cyclobenzaprine (FLEXERIL) 5 MG tablet TAKE 1 TABLET BY MOUTH THREE TIMES DAILY AS NEEDED FOR MUSCLE SPASMS 270 tablet 0    busPIRone (BUSPAR) 10 MG tablet TAKE 1 TABLET BY MOUTH DAILY 90 tablet 3    fluticasone (FLONASE) 50 MCG/ACT nasal spray 2 sprays by Each Nostril route daily 48 g 1    cetirizine (ZYRTEC) 10 MG tablet Take 1 tablet by mouth daily 90 tablet 3    desvenlafaxine succinate (PRISTIQ) 50 MG TB24 extended release tablet TAKE 1 TABLET BY MOUTH DAILY 90 tablet 3    ibuprofen (ADVIL;MOTRIN) 800 MG tablet TAKE 1 TABLET BY MOUTH EVERY 8 HOURS AS NEEDED FOR PAIN 180 tablet 1    Feeding Supplies (BREASTMILK STORAGE BAGS) MISC 1 each by Does not apply route every 3-4 hours as needed (use to store breast milk every 3-6 hours) 100 each 4    Multiple Vitamin (DAILY-YOKASTA MULTIVITAMIN) TABS TAKE 1 TABLET BY MOUTH DAILY 30 tablet 5    vitamin D3 (CHOLECALCIFEROL) 25 MCG (1000 UT) TABS tablet TAKE 1 TABLET BY MOUTH DAILY (Patient not

## 2024-06-04 DIAGNOSIS — D72.819 LEUKOPENIA, UNSPECIFIED TYPE: Primary | ICD-10-CM

## 2024-06-04 LAB
ALBUMIN SERPL-MCNC: 4.5 G/DL (ref 3.4–5)
ALBUMIN/GLOB SERPL: 2.1 {RATIO} (ref 1.1–2.2)
ALP SERPL-CCNC: 93 U/L (ref 40–129)
ALT SERPL-CCNC: 23 U/L (ref 10–40)
ANION GAP SERPL CALCULATED.3IONS-SCNC: 9 MMOL/L (ref 3–16)
AST SERPL-CCNC: 23 U/L (ref 15–37)
BASOPHILS # BLD: 0 K/UL (ref 0–0.2)
BASOPHILS NFR BLD: 0.6 %
BILIRUB SERPL-MCNC: 0.4 MG/DL (ref 0–1)
BUN SERPL-MCNC: 16 MG/DL (ref 7–20)
CALCIUM SERPL-MCNC: 9.6 MG/DL (ref 8.3–10.6)
CHLORIDE SERPL-SCNC: 103 MMOL/L (ref 99–110)
CHOLEST SERPL-MCNC: 207 MG/DL (ref 0–199)
CREAT SERPL-MCNC: 0.7 MG/DL (ref 0.6–1.1)
DEPRECATED RDW RBC AUTO: 13.5 % (ref 12.4–15.4)
EOSINOPHIL # BLD: 0 K/UL (ref 0–0.6)
EOSINOPHIL NFR BLD: 0.5 %
GFR SERPLBLD CREATININE-BSD FMLA CKD-EPI: >90 ML/MIN/{1.73_M2}
GLUCOSE P FAST SERPL-MCNC: 72 MG/DL (ref 70–99)
HCT VFR BLD AUTO: 37.2 % (ref 36–48)
HDLC SERPL-MCNC: 67 MG/DL (ref 40–60)
HGB BLD-MCNC: 12.8 G/DL (ref 12–16)
LDL CHOLESTEROL: 123 MG/DL
LYMPHOCYTES # BLD: 1.3 K/UL (ref 1–5.1)
LYMPHOCYTES NFR BLD: 48.4 %
MCH RBC QN AUTO: 32 PG (ref 26–34)
MCHC RBC AUTO-ENTMCNC: 34.5 G/DL (ref 31–36)
MCV RBC AUTO: 92.9 FL (ref 80–100)
MONOCYTES # BLD: 0.3 K/UL (ref 0–1.3)
MONOCYTES NFR BLD: 12.2 %
NEUTROPHILS # BLD: 1 K/UL (ref 1.7–7.7)
NEUTROPHILS NFR BLD: 38.3 %
PLATELET # BLD AUTO: 210 K/UL (ref 135–450)
PMV BLD AUTO: 8.6 FL (ref 5–10.5)
POTASSIUM SERPL-SCNC: 4.4 MMOL/L (ref 3.5–5.1)
PROT SERPL-MCNC: 6.6 G/DL (ref 6.4–8.2)
RBC # BLD AUTO: 4.01 M/UL (ref 4–5.2)
SODIUM SERPL-SCNC: 138 MMOL/L (ref 136–145)
T4 FREE SERPL-MCNC: 1 NG/DL (ref 0.9–1.8)
TRIGL SERPL-MCNC: 83 MG/DL (ref 0–150)
TSH SERPL DL<=0.005 MIU/L-ACNC: 4.33 UIU/ML (ref 0.27–4.2)
VLDLC SERPL CALC-MCNC: 17 MG/DL
WBC # BLD AUTO: 2.7 K/UL (ref 4–11)

## 2024-06-10 DIAGNOSIS — D72.819 LEUKOPENIA, UNSPECIFIED TYPE: ICD-10-CM

## 2024-06-10 LAB
BASOPHILS # BLD: 0 K/UL (ref 0–0.2)
BASOPHILS NFR BLD: 0.8 %
DEPRECATED RDW RBC AUTO: 13.9 % (ref 12.4–15.4)
EOSINOPHIL # BLD: 0 K/UL (ref 0–0.6)
EOSINOPHIL NFR BLD: 0.6 %
HCT VFR BLD AUTO: 36.9 % (ref 36–48)
HGB BLD-MCNC: 12.8 G/DL (ref 12–16)
LYMPHOCYTES # BLD: 1.4 K/UL (ref 1–5.1)
LYMPHOCYTES NFR BLD: 41 %
MCH RBC QN AUTO: 32.1 PG (ref 26–34)
MCHC RBC AUTO-ENTMCNC: 34.7 G/DL (ref 31–36)
MCV RBC AUTO: 92.6 FL (ref 80–100)
MONOCYTES # BLD: 0.3 K/UL (ref 0–1.3)
MONOCYTES NFR BLD: 8 %
NEUTROPHILS # BLD: 1.7 K/UL (ref 1.7–7.7)
NEUTROPHILS NFR BLD: 49.6 %
PLATELET # BLD AUTO: 236 K/UL (ref 135–450)
PMV BLD AUTO: 8.3 FL (ref 5–10.5)
RBC # BLD AUTO: 3.98 M/UL (ref 4–5.2)
WBC # BLD AUTO: 3.5 K/UL (ref 4–11)

## 2024-06-11 DIAGNOSIS — D72.819 LEUKOPENIA, UNSPECIFIED TYPE: Primary | ICD-10-CM

## 2024-08-07 DIAGNOSIS — M79.18 MYOFASCIAL PAIN: ICD-10-CM

## 2024-08-07 RX ORDER — IBUPROFEN 800 MG/1
800 TABLET ORAL EVERY 8 HOURS PRN
Qty: 180 TABLET | Refills: 1 | Status: SHIPPED | OUTPATIENT
Start: 2024-08-07

## 2024-08-11 ENCOUNTER — OFFICE VISIT (OUTPATIENT)
Age: 34
End: 2024-08-11

## 2024-08-11 VITALS
DIASTOLIC BLOOD PRESSURE: 83 MMHG | OXYGEN SATURATION: 98 % | SYSTOLIC BLOOD PRESSURE: 126 MMHG | WEIGHT: 136 LBS | HEART RATE: 89 BPM | TEMPERATURE: 98 F | HEIGHT: 69 IN | BODY MASS INDEX: 20.14 KG/M2

## 2024-08-11 DIAGNOSIS — R21 RASH: ICD-10-CM

## 2024-08-11 DIAGNOSIS — L23.7 POISON IVY DERMATITIS: Primary | ICD-10-CM

## 2024-08-11 RX ORDER — TRIAMCINOLONE ACETONIDE 1 MG/G
OINTMENT TOPICAL 2 TIMES DAILY
Qty: 20 G | Refills: 0 | Status: SHIPPED | OUTPATIENT
Start: 2024-08-11 | End: 2024-08-18

## 2024-08-11 RX ORDER — METHYLPREDNISOLONE 4 MG/1
TABLET ORAL
Qty: 21 TABLET | Refills: 0 | Status: SHIPPED | OUTPATIENT
Start: 2024-08-11 | End: 2024-08-17

## 2024-08-26 DIAGNOSIS — M79.18 MYOFASCIAL PAIN: ICD-10-CM

## 2024-08-26 RX ORDER — CYCLOBENZAPRINE HCL 5 MG
TABLET ORAL
Qty: 270 TABLET | Refills: 3 | Status: SHIPPED | OUTPATIENT
Start: 2024-08-26

## 2024-09-09 DIAGNOSIS — H69.93 DYSFUNCTION OF BOTH EUSTACHIAN TUBES: ICD-10-CM

## 2024-09-09 RX ORDER — FLUTICASONE PROPIONATE 50 MCG
2 SPRAY, SUSPENSION (ML) NASAL DAILY
Qty: 48 G | Refills: 1 | Status: SHIPPED | OUTPATIENT
Start: 2024-09-09

## 2024-10-07 ENCOUNTER — OFFICE VISIT (OUTPATIENT)
Dept: FAMILY MEDICINE CLINIC | Age: 34
End: 2024-10-07

## 2024-10-07 VITALS — TEMPERATURE: 98.4 F

## 2024-10-07 DIAGNOSIS — Z23 NEED FOR INFLUENZA VACCINATION: Primary | ICD-10-CM

## 2024-12-03 DIAGNOSIS — M25.50 HYPERMOBILITY ARTHRALGIA: ICD-10-CM

## 2024-12-03 RX ORDER — VITAMIN E 268 MG
CAPSULE ORAL DAILY
Qty: 90 CAPSULE | Refills: 3 | Status: SHIPPED | OUTPATIENT
Start: 2024-12-03

## 2025-01-04 ENCOUNTER — OFFICE VISIT (OUTPATIENT)
Age: 35
End: 2025-01-04

## 2025-01-04 VITALS
BODY MASS INDEX: 18.8 KG/M2 | HEIGHT: 70 IN | TEMPERATURE: 97.5 F | WEIGHT: 131.3 LBS | HEART RATE: 77 BPM | OXYGEN SATURATION: 99 % | DIASTOLIC BLOOD PRESSURE: 77 MMHG | SYSTOLIC BLOOD PRESSURE: 121 MMHG

## 2025-01-04 DIAGNOSIS — J01.00 ACUTE MAXILLARY SINUSITIS, RECURRENCE NOT SPECIFIED: Primary | ICD-10-CM

## 2025-01-04 RX ORDER — AZITHROMYCIN 250 MG/1
TABLET, FILM COATED ORAL
Qty: 6 TABLET | Refills: 0 | Status: SHIPPED | OUTPATIENT
Start: 2025-01-04 | End: 2025-01-14

## 2025-01-04 RX ORDER — FLUTICASONE PROPIONATE 50 MCG
1 SPRAY, SUSPENSION (ML) NASAL DAILY
Qty: 16 G | Refills: 0 | Status: SHIPPED | OUTPATIENT
Start: 2025-01-04

## 2025-01-04 ASSESSMENT — ENCOUNTER SYMPTOMS
RHINORRHEA: 1
SINUS PRESSURE: 1

## 2025-01-04 NOTE — PROGRESS NOTES
Kyra Headley (:  1990) is a 34 y.o. female,Established patient, here for evaluation of the following chief complaint(s):  Sinus Problem (Congestion, nasal drainage, bilateral ear pain, sinus pressure for one week and a half )      ASSESSMENT/PLAN:  1. Acute maxillary sinusitis, recurrence not specified  - azithromycin (ZITHROMAX) 250 MG tablet; 500mg on day 1 followed by 250mg on days 2 - 5  Dispense: 6 tablet; Refill: 0  - fluticasone (FLONASE) 50 MCG/ACT nasal spray; 1 spray by Each Nostril route daily  Dispense: 16 g; Refill: 0       Return if symptoms worsen or fail to improve.    SUBJECTIVE/OBJECTIVE:  PRESENT TO CLINIC WITH SINUS PRESSURE FOR ONE WEEK.CONGESTION AND RUNNY NOSE FOR WEEK AND HALF. NO FEVER.      History provided by:  Patient      Vitals:    25 1152   BP: 121/77   Site: Right Upper Arm   Position: Sitting   Cuff Size: Medium Adult   Pulse: 77   Temp: 97.5 °F (36.4 °C)   TempSrc: Oral   SpO2: 99%   Weight: 59.6 kg (131 lb 4.8 oz)   Height: 1.778 m (5' 10\")       Review of Systems   HENT:  Positive for congestion, rhinorrhea and sinus pressure.        Physical Exam  Constitutional:       Appearance: Normal appearance.   HENT:      Head: Normocephalic and atraumatic.      Nose: Congestion present.      Mouth/Throat:      Mouth: Mucous membranes are moist.   Eyes:      Pupils: Pupils are equal, round, and reactive to light.   Pulmonary:      Effort: Pulmonary effort is normal.      Breath sounds: Normal breath sounds.   Musculoskeletal:         General: Normal range of motion.      Cervical back: Normal range of motion and neck supple.   Skin:     General: Skin is warm.   Neurological:      Mental Status: She is alert and oriented to person, place, and time.   Psychiatric:         Mood and Affect: Mood normal.         Behavior: Behavior normal.           An electronic signature was used to authenticate this note.    --Jeanna Boone DO

## 2025-02-05 DIAGNOSIS — F39 MOOD DISORDER (HCC): ICD-10-CM

## 2025-02-05 RX ORDER — DESVENLAFAXINE 50 MG/1
TABLET, FILM COATED, EXTENDED RELEASE ORAL
Qty: 90 TABLET | Refills: 3 | Status: SHIPPED | OUTPATIENT
Start: 2025-02-05

## 2025-02-13 DIAGNOSIS — M79.18 MYOFASCIAL PAIN: ICD-10-CM

## 2025-02-13 RX ORDER — IBUPROFEN 800 MG/1
800 TABLET, FILM COATED ORAL EVERY 8 HOURS PRN
Qty: 180 TABLET | Refills: 1 | Status: SHIPPED | OUTPATIENT
Start: 2025-02-13

## 2025-02-21 DIAGNOSIS — G47.00 INSOMNIA, UNSPECIFIED TYPE: ICD-10-CM

## 2025-02-21 RX ORDER — TRAZODONE HYDROCHLORIDE 100 MG/1
100 TABLET ORAL NIGHTLY
Qty: 90 TABLET | Refills: 0 | Status: SHIPPED | OUTPATIENT
Start: 2025-02-21

## 2025-03-30 ENCOUNTER — OFFICE VISIT (OUTPATIENT)
Age: 35
End: 2025-03-30

## 2025-03-30 VITALS
OXYGEN SATURATION: 98 % | DIASTOLIC BLOOD PRESSURE: 77 MMHG | HEIGHT: 70 IN | HEART RATE: 104 BPM | BODY MASS INDEX: 19.33 KG/M2 | TEMPERATURE: 97.8 F | WEIGHT: 135 LBS | SYSTOLIC BLOOD PRESSURE: 109 MMHG

## 2025-03-30 DIAGNOSIS — J30.1 SEASONAL ALLERGIC RHINITIS DUE TO POLLEN: Primary | ICD-10-CM

## 2025-03-30 RX ORDER — PREDNISONE 20 MG/1
20 TABLET ORAL 2 TIMES DAILY
Qty: 10 TABLET | Refills: 0 | Status: SHIPPED | OUTPATIENT
Start: 2025-03-30 | End: 2025-04-04

## 2025-03-30 RX ORDER — BROMPHENIRAMINE MALEATE, PSEUDOEPHEDRINE HYDROCHLORIDE, AND DEXTROMETHORPHAN HYDROBROMIDE 2; 30; 10 MG/5ML; MG/5ML; MG/5ML
5 SYRUP ORAL 4 TIMES DAILY PRN
Qty: 118 ML | Refills: 0 | Status: SHIPPED | OUTPATIENT
Start: 2025-03-30

## 2025-04-08 ENCOUNTER — OFFICE VISIT (OUTPATIENT)
Dept: FAMILY MEDICINE CLINIC | Age: 35
End: 2025-04-08
Payer: MEDICAID

## 2025-04-08 VITALS — HEART RATE: 93 BPM | TEMPERATURE: 98.2 F | OXYGEN SATURATION: 98 %

## 2025-04-08 DIAGNOSIS — J01.90 ACUTE BACTERIAL SINUSITIS: Primary | ICD-10-CM

## 2025-04-08 DIAGNOSIS — B96.89 ACUTE BACTERIAL SINUSITIS: Primary | ICD-10-CM

## 2025-04-08 PROCEDURE — G8420 CALC BMI NORM PARAMETERS: HCPCS | Performed by: FAMILY MEDICINE

## 2025-04-08 PROCEDURE — 99213 OFFICE O/P EST LOW 20 MIN: CPT | Performed by: FAMILY MEDICINE

## 2025-04-08 PROCEDURE — G8428 CUR MEDS NOT DOCUMENT: HCPCS | Performed by: FAMILY MEDICINE

## 2025-04-08 PROCEDURE — 4004F PT TOBACCO SCREEN RCVD TLK: CPT | Performed by: FAMILY MEDICINE

## 2025-04-08 RX ORDER — AMOXICILLIN 875 MG/1
875 TABLET, COATED ORAL 2 TIMES DAILY
Qty: 20 TABLET | Refills: 0 | Status: SHIPPED | OUTPATIENT
Start: 2025-04-08 | End: 2025-04-18

## 2025-04-08 RX ORDER — GUAIFENESIN, PSEUDOEPHEDRINE HYDROCHLORIDE 600; 60 MG/1; MG/1
1 TABLET, EXTENDED RELEASE ORAL EVERY 12 HOURS
Qty: 20 TABLET | Refills: 0 | Status: SHIPPED | OUTPATIENT
Start: 2025-04-08 | End: 2025-04-18

## 2025-04-08 ASSESSMENT — ENCOUNTER SYMPTOMS
NAUSEA: 1
SORE THROAT: 1
COUGH: 1
RHINORRHEA: 1

## 2025-04-08 NOTE — PROGRESS NOTES
SUBJECTIVE:    Kyra Headley is a 34 y.o. female who presents for a follow up visit.    Chief Complaint   Patient presents with    Congestion        Ear Pain   There is pain in both ears. This is a new problem. The current episode started 1 to 4 weeks ago. The problem occurs constantly. The problem has been waxing and waning. There has been no fever. The pain is moderate. Associated symptoms include coughing, headaches, hearing loss, rhinorrhea and a sore throat.        Patient's medications, allergies, past medical,surgical, social and family histories were reviewed and updated as appropriate.     Past Medical History:   Diagnosis Date    Seasonal allergies     Undifferentiated connective tissue disease 04/25/2019     Past Surgical History:   Procedure Laterality Date    TONSILLECTOMY  2007     Family History   Problem Relation Age of Onset    Other Mother         mixed tissue disorder     Social History     Tobacco Use    Smoking status: Every Day     Current packs/day: 0.15     Types: Cigarettes    Smokeless tobacco: Never   Substance Use Topics    Alcohol use: No      Allergies   Allergen Reactions    Meloxicam Other (See Comments)     Pt states it just knocked her out      Current Outpatient Medications on File Prior to Visit   Medication Sig Dispense Refill    brompheniramine-pseudoephedrine-DM 2-30-10 MG/5ML syrup Take 5 mLs by mouth 4 times daily as needed for Cough or Congestion 118 mL 0    traZODone (DESYREL) 100 MG tablet TAKE 1 TABLET BY MOUTH EVERY NIGHT 90 tablet 0    ibuprofen (ADVIL;MOTRIN) 800 MG tablet TAKE 1 TABLET BY MOUTH EVERY 8 HOURS AS NEEDED FOR PAIN 180 tablet 1    desvenlafaxine succinate (PRISTIQ) 50 MG TB24 extended release tablet TAKE 1 TABLET BY MOUTH DAILY 90 tablet 3    fluticasone (FLONASE) 50 MCG/ACT nasal spray 1 spray by Each Nostril route daily 16 g 0    vitamin E 180 MG (400 UNIT) CAPS capsule TAKE 1 CAPSULE BY MOUTH EVERY DAY 90 capsule 3    cyclobenzaprine (FLEXERIL) 5

## 2025-04-23 DIAGNOSIS — F39 MOOD DISORDER: ICD-10-CM

## 2025-04-23 RX ORDER — BUSPIRONE HYDROCHLORIDE 10 MG/1
10 TABLET ORAL DAILY
Qty: 90 TABLET | Refills: 0 | Status: SHIPPED | OUTPATIENT
Start: 2025-04-23

## 2025-04-28 ENCOUNTER — OFFICE VISIT (OUTPATIENT)
Age: 35
End: 2025-04-28

## 2025-04-28 VITALS
TEMPERATURE: 98.3 F | OXYGEN SATURATION: 96 % | BODY MASS INDEX: 19.37 KG/M2 | WEIGHT: 135 LBS | HEART RATE: 90 BPM | SYSTOLIC BLOOD PRESSURE: 108 MMHG | DIASTOLIC BLOOD PRESSURE: 73 MMHG

## 2025-04-28 DIAGNOSIS — H10.32 ACUTE BACTERIAL CONJUNCTIVITIS OF LEFT EYE: Primary | ICD-10-CM

## 2025-04-28 DIAGNOSIS — H65.191 ACUTE NON-SUPPURATIVE OTITIS MEDIA, RIGHT: ICD-10-CM

## 2025-04-28 RX ORDER — POLYMYXIN B SULFATE AND TRIMETHOPRIM 1; 10000 MG/ML; [USP'U]/ML
1 SOLUTION OPHTHALMIC 4 TIMES DAILY
Qty: 10 ML | Refills: 0 | Status: SHIPPED | OUTPATIENT
Start: 2025-04-28 | End: 2025-05-08

## 2025-04-28 ASSESSMENT — ENCOUNTER SYMPTOMS
GASTROINTESTINAL NEGATIVE: 1
RESPIRATORY NEGATIVE: 1
EYE REDNESS: 1

## 2025-04-28 NOTE — PATIENT INSTRUCTIONS
Wash your hands frequently, especially after touching your face or eyes.  Pinkeye is very contagious.     Do not use any contacts or eye makeup for the next week     Discard any mascara or other eye makeup you have worn in the last 24 hours     Wash your makeup brushes in warm soapy water     Change your pillow case every morning.  You can use a T-Shirt to cover your pillow if you run out of pillow cases     Wash your towels and washcloths after each use if they touch your face for the next week.     Do not share your medications with anyone

## 2025-04-28 NOTE — PROGRESS NOTES
Kyra Headley (: 1990) is a 34 y.o. female, Established patient, here for evaluation of the following chief complaint(s):  Eye Pain (Pt reports left eye pain for a week. Feels uncomfortable. )      ASSESSMENT/PLAN:    ICD-10-CM    1. Acute bacterial conjunctivitis of left eye  H10.32 trimethoprim-polymyxin b (POLYTRIM) 77177-2.1 UNIT/ML-% ophthalmic solution      2. Acute non-suppurative otitis media, right  H65.191 amoxicillin-clavulanate (AUGMENTIN) 875-125 MG per tablet              Patient exam as below. Patient was ambulatory, nontoxic, and medically stable.    History obtained from patient.     The patient's external records have been reviewed.     I have reviewed the patient's labs from this encounter with results as noted below.     Differential diagnosis considered. Overall presentation is consistent with acute bacterial conjunctivitis left eye, acute otitis media right ear.     Kyra Headley will be treated outpatient with augmentin, polytrim.     Disposition: Discharged with instructions to obtain outpatient follow up via their primary care provider in the next 3 to 5 days, with strict return precautions if patient develops new or worsening symptoms.      SUBJECTIVE/OBJECTIVE:  In brief, Kyra is a generally well-appearing 34-year-old female who presents today with left eye irritation that has been ongoing for the past 3 to 4 days as well as right ear pain.  She states that she has been experiencing some degree of postnasal drip as well.  She denies any known exposure to known illnesses that she is aware of and notes that she is generally healthy when she is at her baseline otherwise.      Eye Pain   Associated symptoms include eye redness.       VITAL SIGNS  Vitals:    25 1159   BP: 108/73   BP Site: Right Upper Arm   Patient Position: Sitting   BP Cuff Size: Medium Adult   Pulse: 90   Temp: 98.3 °F (36.8 °C)   TempSrc: Oral   SpO2: 96%   Weight: 61.2 kg (135 lb)

## 2025-05-20 DIAGNOSIS — G47.00 INSOMNIA, UNSPECIFIED TYPE: ICD-10-CM

## 2025-05-20 RX ORDER — TRAZODONE HYDROCHLORIDE 100 MG/1
100 TABLET ORAL NIGHTLY
Qty: 90 TABLET | Refills: 0 | Status: SHIPPED | OUTPATIENT
Start: 2025-05-20

## 2025-05-21 ENCOUNTER — OFFICE VISIT (OUTPATIENT)
Dept: FAMILY MEDICINE CLINIC | Age: 35
End: 2025-05-21
Payer: MEDICAID

## 2025-05-21 VITALS
BODY MASS INDEX: 20.67 KG/M2 | DIASTOLIC BLOOD PRESSURE: 64 MMHG | HEIGHT: 70 IN | TEMPERATURE: 97.7 F | SYSTOLIC BLOOD PRESSURE: 118 MMHG | HEART RATE: 89 BPM | OXYGEN SATURATION: 99 % | WEIGHT: 144.4 LBS

## 2025-05-21 DIAGNOSIS — J30.2 SEASONAL ALLERGIES: ICD-10-CM

## 2025-05-21 DIAGNOSIS — B07.0 PLANTAR WART OF LEFT FOOT: ICD-10-CM

## 2025-05-21 DIAGNOSIS — Z13.29 SCREENING FOR THYROID DISORDER: ICD-10-CM

## 2025-05-21 DIAGNOSIS — G47.00 INSOMNIA, UNSPECIFIED TYPE: ICD-10-CM

## 2025-05-21 DIAGNOSIS — Z13.220 SCREENING FOR CHOLESTEROL LEVEL: ICD-10-CM

## 2025-05-21 DIAGNOSIS — F41.1 GENERALIZED ANXIETY DISORDER: ICD-10-CM

## 2025-05-21 DIAGNOSIS — D69.6 THROMBOCYTOPENIA: ICD-10-CM

## 2025-05-21 DIAGNOSIS — M79.18 MYOFASCIAL PAIN: ICD-10-CM

## 2025-05-21 DIAGNOSIS — Z00.00 PREVENTATIVE HEALTH CARE: Primary | ICD-10-CM

## 2025-05-21 PROCEDURE — 17110 DESTRUCTION B9 LES UP TO 14: CPT | Performed by: NURSE PRACTITIONER

## 2025-05-21 PROCEDURE — 99395 PREV VISIT EST AGE 18-39: CPT | Performed by: NURSE PRACTITIONER

## 2025-05-21 RX ORDER — HYDROXYZINE HYDROCHLORIDE 50 MG/1
50 TABLET, FILM COATED ORAL NIGHTLY PRN
Qty: 90 TABLET | Refills: 1 | Status: SHIPPED | OUTPATIENT
Start: 2025-05-21 | End: 2025-08-19

## 2025-05-21 SDOH — ECONOMIC STABILITY: FOOD INSECURITY: WITHIN THE PAST 12 MONTHS, YOU WORRIED THAT YOUR FOOD WOULD RUN OUT BEFORE YOU GOT MONEY TO BUY MORE.: NEVER TRUE

## 2025-05-21 SDOH — ECONOMIC STABILITY: FOOD INSECURITY: WITHIN THE PAST 12 MONTHS, THE FOOD YOU BOUGHT JUST DIDN'T LAST AND YOU DIDN'T HAVE MONEY TO GET MORE.: NEVER TRUE

## 2025-05-21 ASSESSMENT — PATIENT HEALTH QUESTIONNAIRE - PHQ9
4. FEELING TIRED OR HAVING LITTLE ENERGY: NEARLY EVERY DAY
8. MOVING OR SPEAKING SO SLOWLY THAT OTHER PEOPLE COULD HAVE NOTICED. OR THE OPPOSITE, BEING SO FIGETY OR RESTLESS THAT YOU HAVE BEEN MOVING AROUND A LOT MORE THAN USUAL: NEARLY EVERY DAY
2. FEELING DOWN, DEPRESSED OR HOPELESS: MORE THAN HALF THE DAYS
10. IF YOU CHECKED OFF ANY PROBLEMS, HOW DIFFICULT HAVE THESE PROBLEMS MADE IT FOR YOU TO DO YOUR WORK, TAKE CARE OF THINGS AT HOME, OR GET ALONG WITH OTHER PEOPLE: EXTREMELY DIFFICULT
SUM OF ALL RESPONSES TO PHQ QUESTIONS 1-9: 16
9. THOUGHTS THAT YOU WOULD BE BETTER OFF DEAD, OR OF HURTING YOURSELF: NOT AT ALL
SUM OF ALL RESPONSES TO PHQ QUESTIONS 1-9: 16
SUM OF ALL RESPONSES TO PHQ QUESTIONS 1-9: 16
5. POOR APPETITE OR OVEREATING: NEARLY EVERY DAY
6. FEELING BAD ABOUT YOURSELF - OR THAT YOU ARE A FAILURE OR HAVE LET YOURSELF OR YOUR FAMILY DOWN: NOT AT ALL
SUM OF ALL RESPONSES TO PHQ QUESTIONS 1-9: 16
1. LITTLE INTEREST OR PLEASURE IN DOING THINGS: MORE THAN HALF THE DAYS
7. TROUBLE CONCENTRATING ON THINGS, SUCH AS READING THE NEWSPAPER OR WATCHING TELEVISION: NEARLY EVERY DAY
3. TROUBLE FALLING OR STAYING ASLEEP: NOT AT ALL

## 2025-05-21 NOTE — PROGRESS NOTES
Kyra Headley  : 1990  Encounter date: 2025    This donovan 34 y.o. female who presents with  Chief Complaint   Patient presents with    Annual Exam     Noticed a year ago left foot. On the pad of her foot.yellowish ring. Painful when barefoot. Heating pad for pain.       History of present illness:    HPI History and Physical      Kyra Headley  YOB: 1990    Date of Service:  2025    Chief Complaint:   Kyra Headley is a 34 y.o. female who  presents for physical examination.    HPI: Pt is 34 year old female for annual exam.  Concerns regarding lesion on L foot, could be plantar wart, reports tenderness,  has not tried anything so far.  Pt with existing depression and anxiety, poorly controlled due to daily life stressors.  Reports has had gene site testing completed in past, would like to review records. Pt reports sleeping poorly, taking high dose trazodone.  Previously prescribed hydroxyzine, will resume higher dose nightly.  Pt with reoccurring sinus and ear infections, previously established with Dr. Zamudio, ENT, has had sinus imaging completed.  Pt with existing myafascial pain syndrome, auto-immune, reports has been on multiple rounds of steroids for URI in last few months.  Reports generalized pain and flare ups, not currently established with rheumatology.    Wt Readings from Last 3 Encounters:   25 65.5 kg (144 lb 6.4 oz)   25 61.2 kg (135 lb)   25 61.2 kg (135 lb)     BP Readings from Last 3 Encounters:   25 118/64   25 108/73   25 109/77       Patient Active Problem List   Diagnosis    Mood disorder    Acne    Myofascial pain    Neck swelling    History of sinus tachycardia    Tobacco use    Generalized anxiety disorder    Cigarette nicotine dependence with nicotine-induced disorder    Hypermobility arthralgia    Anxiety    Mood disorder       Preventive Care:  Health Maintenance   Topic Date Due    Varicella vaccine (1

## 2025-06-02 ENCOUNTER — OFFICE VISIT (OUTPATIENT)
Age: 35
End: 2025-06-02

## 2025-06-02 VITALS
HEIGHT: 70 IN | OXYGEN SATURATION: 97 % | BODY MASS INDEX: 20.76 KG/M2 | SYSTOLIC BLOOD PRESSURE: 116 MMHG | HEART RATE: 95 BPM | DIASTOLIC BLOOD PRESSURE: 64 MMHG | TEMPERATURE: 98 F | WEIGHT: 145 LBS

## 2025-06-02 DIAGNOSIS — J30.2 SEASONAL ALLERGIES: ICD-10-CM

## 2025-06-02 DIAGNOSIS — H66.002 NON-RECURRENT ACUTE SUPPURATIVE OTITIS MEDIA OF LEFT EAR WITHOUT SPONTANEOUS RUPTURE OF TYMPANIC MEMBRANE: Primary | ICD-10-CM

## 2025-06-02 RX ORDER — FEXOFENADINE HCL 180 MG/1
180 TABLET ORAL DAILY
Qty: 30 TABLET | Refills: 0 | Status: SHIPPED | OUTPATIENT
Start: 2025-06-02 | End: 2025-07-02

## 2025-06-02 NOTE — PROGRESS NOTES
Kyra Headley (:  1990) is a 34 y.o. female,Established patient, here for evaluation of the following chief complaint(s):  Ear Pain (Bilateral ear pain for two days), Pharyngitis (Sore throat for two days), and Eye Problem (Eye redness and irritation for two weeks )    Patient presents for evaluation of bilateral ear pain, sore throat, and eye redness for the last 2 weeks.  Assessment is consistent with left ear otitis media and seasonal allergies.  Prescribed Augmentin for empiric coverage as her child recently had a double ear infection. Fexofenadine prescribed for seasonal allergies. Follow up with pcp advised.     ASSESSMENT/PLAN:  1. Non-recurrent acute suppurative otitis media of left ear without spontaneous rupture of tympanic membrane    - amoxicillin-clavulanate (AUGMENTIN) 875-125 MG per tablet; Take 1 tablet by mouth 2 times daily for 7 days  Dispense: 14 tablet; Refill: 0  -You may take Acetaminophen or Ibuprofen as directed on packaging for discomfort.     2. Seasonal allergies    - fexofenadine (ALLEGRA) 180 MG tablet; Take 1 tablet by mouth daily  Dispense: 30 tablet; Refill: 0       Return if symptoms worsen or fail to improve.    SUBJECTIVE/OBJECTIVE:  HPI:   34 y.o. female with history of seasonal allergies presents alone for complaint of bilateral ear pain and sore throat x 2 days, eye irritation with redness x 2 days.    Admits her son was diagnosed with double ear infections last week.    Denies fever and chills    Has attempted Claritin for symptoms, mildly effective.    Nothing makes symptoms better or worse.    Patient provided the HPI by themself - the patient is considered to be a reliable historian.      Ear Pain   Associated symptoms include rhinorrhea and a sore throat. Pertinent negatives include no abdominal pain, coughing, diarrhea, headaches, rash or vomiting.   Pharyngitis  Associated symptoms: ear pain, rhinorrhea and sore throat    Associated symptoms: no abdominal

## 2025-06-03 ASSESSMENT — VISUAL ACUITY: OU: 1

## 2025-06-03 ASSESSMENT — ENCOUNTER SYMPTOMS
CHOKING: 0
DIARRHEA: 0
SINUS PRESSURE: 0
VOMITING: 0
RESPIRATORY NEGATIVE: 1
CHEST TIGHTNESS: 0
RHINORRHEA: 1
COUGH: 0
ABDOMINAL PAIN: 0
WHEEZING: 0
EYE PAIN: 0
VOICE CHANGE: 0
EYE ITCHING: 1
SHORTNESS OF BREATH: 0
NAUSEA: 0
PHOTOPHOBIA: 0
EYE REDNESS: 1
EYE DISCHARGE: 0
TROUBLE SWALLOWING: 0
SINUS PAIN: 0
GASTROINTESTINAL NEGATIVE: 1
FACIAL SWELLING: 0
SORE THROAT: 1

## 2025-07-10 ENCOUNTER — OFFICE VISIT (OUTPATIENT)
Dept: FAMILY MEDICINE CLINIC | Age: 35
End: 2025-07-10
Payer: MEDICAID

## 2025-07-10 VITALS
BODY MASS INDEX: 20.2 KG/M2 | WEIGHT: 140.8 LBS | TEMPERATURE: 99.5 F | SYSTOLIC BLOOD PRESSURE: 110 MMHG | OXYGEN SATURATION: 99 % | HEART RATE: 82 BPM | DIASTOLIC BLOOD PRESSURE: 77 MMHG

## 2025-07-10 DIAGNOSIS — L30.9 DERMATITIS: Primary | ICD-10-CM

## 2025-07-10 PROCEDURE — 4004F PT TOBACCO SCREEN RCVD TLK: CPT | Performed by: NURSE PRACTITIONER

## 2025-07-10 PROCEDURE — G8427 DOCREV CUR MEDS BY ELIG CLIN: HCPCS | Performed by: NURSE PRACTITIONER

## 2025-07-10 PROCEDURE — G8420 CALC BMI NORM PARAMETERS: HCPCS | Performed by: NURSE PRACTITIONER

## 2025-07-10 PROCEDURE — 99213 OFFICE O/P EST LOW 20 MIN: CPT | Performed by: NURSE PRACTITIONER

## 2025-07-10 RX ORDER — TRIAMCINOLONE ACETONIDE 1 MG/G
OINTMENT TOPICAL 2 TIMES DAILY
Qty: 80 G | Refills: 0 | Status: SHIPPED | OUTPATIENT
Start: 2025-07-10 | End: 2025-07-17

## 2025-07-10 RX ORDER — PREDNISONE 10 MG/1
TABLET ORAL
Qty: 21 TABLET | Refills: 0 | Status: SHIPPED | OUTPATIENT
Start: 2025-07-10

## 2025-07-10 SDOH — ECONOMIC STABILITY: FOOD INSECURITY: WITHIN THE PAST 12 MONTHS, YOU WORRIED THAT YOUR FOOD WOULD RUN OUT BEFORE YOU GOT MONEY TO BUY MORE.: NEVER TRUE

## 2025-07-10 SDOH — ECONOMIC STABILITY: FOOD INSECURITY: WITHIN THE PAST 12 MONTHS, THE FOOD YOU BOUGHT JUST DIDN'T LAST AND YOU DIDN'T HAVE MONEY TO GET MORE.: NEVER TRUE

## 2025-07-10 NOTE — PROGRESS NOTES
disease 04/25/2019      Past Surgical History:   Procedure Laterality Date    TONSILLECTOMY  2007      Family History   Problem Relation Age of Onset    Other Mother         mixed tissue disorder      Social History     Tobacco Use    Smoking status: Every Day     Current packs/day: 0.15     Types: Cigarettes    Smokeless tobacco: Never   Substance Use Topics    Alcohol use: No        Review of Systems    Objective:    /77 (BP Site: Right Upper Arm, Patient Position: Sitting, BP Cuff Size: Medium Adult)   Pulse 82   Temp 99.5 °F (37.5 °C) (Infrared)   Wt 63.9 kg (140 lb 12.8 oz)   SpO2 99%   BMI 20.20 kg/m²   Weight - Scale: 63.9 kg (140 lb 12.8 oz)     BP Readings from Last 3 Encounters:   07/10/25 110/77   06/02/25 116/64   05/21/25 118/64     Wt Readings from Last 3 Encounters:   07/10/25 63.9 kg (140 lb 12.8 oz)   06/02/25 65.8 kg (145 lb)   05/21/25 65.5 kg (144 lb 6.4 oz)     BMI Readings from Last 3 Encounters:   07/10/25 20.20 kg/m²   06/02/25 20.81 kg/m²   05/21/25 20.60 kg/m²       Physical Exam  Vitals reviewed.   Constitutional:       Appearance: Normal appearance. She is well-developed.   HENT:      Right Ear: Tympanic membrane and ear canal normal.      Left Ear: Tympanic membrane and ear canal normal.   Pulmonary:      Effort: Pulmonary effort is normal.   Skin:     General: Skin is warm and dry.      Findings: Rash (bilaeral FA, macupapular/vesicular, pruritic) present.   Neurological:      Mental Status: She is alert and oriented to person, place, and time.   Psychiatric:         Mood and Affect: Mood normal.         Assessment/Plan    1. Dermatitis  Advised continue vistaril  Cool compress  - triamcinolone (KENALOG) 0.1 % ointment; Apply topically 2 times daily for 7 days  Dispense: 80 g; Refill: 0  - predniSONE (DELTASONE) 10 MG tablet; 60 mg day 1(6 tabs), 50 mg day 2 (5 tabs), 40 mg day 3 (4 tabs), 30 mg day 4 (3 tabs), 20 mg day 5 (2 tabs), 10 mg day 6 (1 tab)  Dispense: 21 tablet;

## 2025-07-23 DIAGNOSIS — D69.6 THROMBOCYTOPENIA: ICD-10-CM

## 2025-07-23 DIAGNOSIS — Z00.00 PREVENTATIVE HEALTH CARE: ICD-10-CM

## 2025-07-23 DIAGNOSIS — Z13.29 SCREENING FOR THYROID DISORDER: ICD-10-CM

## 2025-07-23 DIAGNOSIS — F39 MOOD DISORDER: ICD-10-CM

## 2025-07-23 DIAGNOSIS — Z13.220 SCREENING FOR CHOLESTEROL LEVEL: ICD-10-CM

## 2025-07-23 LAB
ALBUMIN SERPL-MCNC: 4.6 G/DL (ref 3.4–5)
ALBUMIN/GLOB SERPL: 2.3 {RATIO} (ref 1.1–2.2)
ALP SERPL-CCNC: 77 U/L (ref 40–129)
ALT SERPL-CCNC: 14 U/L (ref 10–40)
ANION GAP SERPL CALCULATED.3IONS-SCNC: 12 MMOL/L (ref 3–16)
AST SERPL-CCNC: 18 U/L (ref 15–37)
BASOPHILS # BLD: 0 K/UL (ref 0–0.2)
BASOPHILS NFR BLD: 0.5 %
BILIRUB SERPL-MCNC: 0.7 MG/DL (ref 0–1)
BUN SERPL-MCNC: 15 MG/DL (ref 7–20)
CALCIUM SERPL-MCNC: 9.2 MG/DL (ref 8.3–10.6)
CHLORIDE SERPL-SCNC: 102 MMOL/L (ref 99–110)
CHOLEST SERPL-MCNC: 228 MG/DL (ref 0–199)
CO2 SERPL-SCNC: 26 MMOL/L (ref 21–32)
CREAT SERPL-MCNC: 0.7 MG/DL (ref 0.6–1.1)
DEPRECATED RDW RBC AUTO: 13.5 % (ref 12.4–15.4)
EOSINOPHIL # BLD: 0 K/UL (ref 0–0.6)
EOSINOPHIL NFR BLD: 0.8 %
GFR SERPLBLD CREATININE-BSD FMLA CKD-EPI: >90 ML/MIN/{1.73_M2}
GLUCOSE P FAST SERPL-MCNC: 93 MG/DL (ref 70–99)
HCT VFR BLD AUTO: 41.7 % (ref 36–48)
HDLC SERPL-MCNC: 63 MG/DL (ref 40–60)
HGB BLD-MCNC: 14 G/DL (ref 12–16)
LDL CHOLESTEROL: 142 MG/DL
LYMPHOCYTES # BLD: 1 K/UL (ref 1–5.1)
LYMPHOCYTES NFR BLD: 26.1 %
MCH RBC QN AUTO: 31.7 PG (ref 26–34)
MCHC RBC AUTO-ENTMCNC: 33.5 G/DL (ref 31–36)
MCV RBC AUTO: 94.6 FL (ref 80–100)
MONOCYTES # BLD: 0.4 K/UL (ref 0–1.3)
MONOCYTES NFR BLD: 10.9 %
NEUTROPHILS # BLD: 2.4 K/UL (ref 1.7–7.7)
NEUTROPHILS NFR BLD: 61.7 %
PLATELET # BLD AUTO: 236 K/UL (ref 135–450)
PMV BLD AUTO: 8.6 FL (ref 5–10.5)
POTASSIUM SERPL-SCNC: 4.6 MMOL/L (ref 3.5–5.1)
PROT SERPL-MCNC: 6.6 G/DL (ref 6.4–8.2)
RBC # BLD AUTO: 4.41 M/UL (ref 4–5.2)
SODIUM SERPL-SCNC: 140 MMOL/L (ref 136–145)
T4 FREE SERPL-MCNC: 0.9 NG/DL (ref 0.9–1.8)
TRIGL SERPL-MCNC: 117 MG/DL (ref 0–150)
TSH SERPL DL<=0.005 MIU/L-ACNC: 4.78 UIU/ML (ref 0.27–4.2)
VLDLC SERPL CALC-MCNC: 23 MG/DL
WBC # BLD AUTO: 3.9 K/UL (ref 4–11)

## 2025-07-23 RX ORDER — BUSPIRONE HYDROCHLORIDE 10 MG/1
10 TABLET ORAL DAILY
Qty: 90 TABLET | Refills: 0 | Status: SHIPPED | OUTPATIENT
Start: 2025-07-23

## 2025-07-23 NOTE — TELEPHONE ENCOUNTER
Medication:   Requested Prescriptions     Pending Prescriptions Disp Refills    busPIRone (BUSPAR) 10 MG tablet [Pharmacy Med Name: BUSPIRONE 10MG TABLETS] 90 tablet 0     Sig: TAKE 1 TABLET BY MOUTH DAILY        Patient Phone Number: [phone] (home)     Last appt: 7/10/2025   Next appt: Visit date not found    Last OARRS:       7/2/2019    10:33 PM   RX Monitoring   Periodic Controlled Substance Monitoring Possible medication side effects, risk of tolerance/dependence & alternative treatments discussed.     PDMP Monitoring:    Last PDMP Андрей as Reviewed ([place]):  Review User Review Instant Review Result   JANE MORAN 1/26/2023  1:43 PM Reviewed PDMP [1]     [facility]:   [id number] [address] - P [phone] - F [phone] [address] [facility] [address]  Phone: [phone] Fax: [phone]    [id number] [place], [place] - [address] - P [phone] - F [phone]  [address] [facility] [address]  Phone: [phone] Fax: [phone]    [id number] - [place], [place] - [address] [facility] -  [phone] - F [phone]  [address] [facility] [address]  Phone: [phone] Fax: [phone]

## 2025-08-17 DIAGNOSIS — G47.00 INSOMNIA, UNSPECIFIED TYPE: ICD-10-CM

## 2025-08-19 RX ORDER — TRAZODONE HYDROCHLORIDE 100 MG/1
100 TABLET ORAL NIGHTLY
Qty: 90 TABLET | Refills: 0 | Status: SHIPPED | OUTPATIENT
Start: 2025-08-19

## 2025-09-03 DIAGNOSIS — M79.18 MYOFASCIAL PAIN: ICD-10-CM

## 2025-09-03 RX ORDER — IBUPROFEN 800 MG/1
800 TABLET, FILM COATED ORAL EVERY 8 HOURS PRN
Qty: 180 TABLET | Refills: 1 | Status: SHIPPED | OUTPATIENT
Start: 2025-09-03